# Patient Record
Sex: FEMALE | Race: WHITE | Employment: FULL TIME | URBAN - METROPOLITAN AREA
[De-identification: names, ages, dates, MRNs, and addresses within clinical notes are randomized per-mention and may not be internally consistent; named-entity substitution may affect disease eponyms.]

---

## 2017-03-22 ENCOUNTER — HOSPITAL ENCOUNTER (OUTPATIENT)
Age: 27
Setting detail: SPECIMEN
Discharge: HOME | End: 2017-03-22

## 2017-03-22 ENCOUNTER — OFFICE VISIT (OUTPATIENT)
Dept: OBGYN CLINIC | Age: 27
End: 2017-03-22
Payer: COMMERCIAL

## 2017-03-22 VITALS — DIASTOLIC BLOOD PRESSURE: 64 MMHG | WEIGHT: 293 LBS | SYSTOLIC BLOOD PRESSURE: 126 MMHG

## 2017-03-22 DIAGNOSIS — Z01.419 ENCOUNTER FOR GYNECOLOGICAL EXAMINATION: Primary | ICD-10-CM

## 2017-03-22 DIAGNOSIS — N91.5 OLIGOMENORRHEA: ICD-10-CM

## 2017-03-22 DIAGNOSIS — E28.2 PCOS (POLYCYSTIC OVARIAN SYNDROME): ICD-10-CM

## 2017-03-22 PROCEDURE — 99385 PREV VISIT NEW AGE 18-39: CPT | Performed by: OBSTETRICS & GYNECOLOGY

## 2017-03-22 RX ORDER — PAROXETINE HYDROCHLORIDE 20 MG/1
20 TABLET, FILM COATED ORAL EVERY MORNING
COMMUNITY
End: 2021-12-22 | Stop reason: ALTCHOICE

## 2017-03-27 ENCOUNTER — HOSPITAL ENCOUNTER (OUTPATIENT)
Dept: ULTRASOUND IMAGING | Age: 27
Discharge: HOME | End: 2017-03-27
Payer: COMMERCIAL

## 2017-03-27 ENCOUNTER — TELEPHONE (OUTPATIENT)
Dept: OBGYN CLINIC | Age: 27
End: 2017-03-27

## 2017-03-27 DIAGNOSIS — E28.2 PCOS (POLYCYSTIC OVARIAN SYNDROME): Primary | ICD-10-CM

## 2017-03-27 DIAGNOSIS — E28.2 PCOS (POLYCYSTIC OVARIAN SYNDROME): ICD-10-CM

## 2017-03-27 DIAGNOSIS — N91.5 OLIGOMENORRHEA: ICD-10-CM

## 2017-03-27 LAB — CYTOLOGY REPORT: NORMAL

## 2017-03-27 PROCEDURE — 76830 TRANSVAGINAL US NON-OB: CPT

## 2017-03-27 PROCEDURE — 76856 US EXAM PELVIC COMPLETE: CPT

## 2017-06-07 ENCOUNTER — TELEPHONE (OUTPATIENT)
Dept: OBGYN CLINIC | Age: 27
End: 2017-06-07

## 2017-06-07 DIAGNOSIS — E28.2 PCOS (POLYCYSTIC OVARIAN SYNDROME): Primary | ICD-10-CM

## 2017-06-13 ENCOUNTER — TELEPHONE (OUTPATIENT)
Dept: OBGYN CLINIC | Age: 27
End: 2017-06-13

## 2017-06-13 DIAGNOSIS — E28.2 PCOS (POLYCYSTIC OVARIAN SYNDROME): Primary | ICD-10-CM

## 2017-08-24 ENCOUNTER — HOSPITAL ENCOUNTER (OUTPATIENT)
Age: 27
Discharge: HOME | End: 2017-08-24
Payer: COMMERCIAL

## 2017-08-24 DIAGNOSIS — E28.2 PCOS (POLYCYSTIC OVARIAN SYNDROME): ICD-10-CM

## 2017-08-24 LAB
ABSOLUTE EOS #: 0.1 K/UL (ref 0–0.4)
ABSOLUTE LYMPH #: 2.7 K/UL (ref 1–4.8)
ABSOLUTE MONO #: 0.5 K/UL (ref 0.1–1.2)
BASOPHILS # BLD: 1 %
BASOPHILS ABSOLUTE: 0 K/UL (ref 0–0.2)
DIFFERENTIAL TYPE: NORMAL
EOSINOPHILS RELATIVE PERCENT: 1 %
GLUCOSE FASTING: 87 MG/DL (ref 70–99)
HCT VFR BLD CALC: 39.3 % (ref 36–46)
HEMOGLOBIN: 13 G/DL (ref 12–16)
INSULIN COMMENT: NORMAL
INSULIN REFERENCE RANGE:: NORMAL
INSULIN: 30.8 MU/L
LYMPHOCYTES # BLD: 31 %
MCH RBC QN AUTO: 26.8 PG (ref 26–34)
MCHC RBC AUTO-ENTMCNC: 32.9 G/DL (ref 31–37)
MCV RBC AUTO: 81.4 FL (ref 80–100)
MONOCYTES # BLD: 6 %
PDW BLD-RTO: 14.7 % (ref 12.5–15.4)
PLATELET # BLD: 402 K/UL (ref 140–450)
PLATELET ESTIMATE: NORMAL
PMV BLD AUTO: 8.1 FL (ref 6–12)
PROLACTIN: 16.01 UG/L (ref 4.79–23.3)
RBC # BLD: 4.84 M/UL (ref 4–5.2)
RBC # BLD: NORMAL 10*6/UL
SEG NEUTROPHILS: 61 %
SEGMENTED NEUTROPHILS ABSOLUTE COUNT: 5.3 K/UL (ref 1.8–7.7)
TSH SERPL DL<=0.05 MIU/L-ACNC: 2.17 MIU/L (ref 0.3–5)
WBC # BLD: 8.6 K/UL (ref 3.5–11)
WBC # BLD: NORMAL 10*3/UL

## 2017-08-24 PROCEDURE — 82947 ASSAY GLUCOSE BLOOD QUANT: CPT

## 2017-08-24 PROCEDURE — 83525 ASSAY OF INSULIN: CPT

## 2017-08-24 PROCEDURE — 84146 ASSAY OF PROLACTIN: CPT

## 2017-08-24 PROCEDURE — 36415 COLL VENOUS BLD VENIPUNCTURE: CPT

## 2017-08-24 PROCEDURE — 84443 ASSAY THYROID STIM HORMONE: CPT

## 2017-08-24 PROCEDURE — 85025 COMPLETE CBC W/AUTO DIFF WBC: CPT

## 2017-09-25 ENCOUNTER — INITIAL CONSULT (OUTPATIENT)
Dept: OBGYN CLINIC | Age: 27
End: 2017-09-25
Payer: COMMERCIAL

## 2017-09-25 VITALS
BODY MASS INDEX: 47.09 KG/M2 | SYSTOLIC BLOOD PRESSURE: 122 MMHG | HEIGHT: 66 IN | WEIGHT: 293 LBS | DIASTOLIC BLOOD PRESSURE: 82 MMHG

## 2017-09-25 DIAGNOSIS — L68.0 HIRSUTISM: ICD-10-CM

## 2017-09-25 DIAGNOSIS — E66.9 OBESITY, UNSPECIFIED OBESITY SEVERITY, UNSPECIFIED OBESITY TYPE: ICD-10-CM

## 2017-09-25 DIAGNOSIS — E28.2 PCOS (POLYCYSTIC OVARIAN SYNDROME): ICD-10-CM

## 2017-09-25 DIAGNOSIS — E88.81 INSULIN RESISTANCE: Primary | ICD-10-CM

## 2017-09-25 PROBLEM — E88.819 INSULIN RESISTANCE: Status: ACTIVE | Noted: 2017-09-25

## 2017-09-25 PROCEDURE — 99214 OFFICE O/P EST MOD 30 MIN: CPT | Performed by: OBSTETRICS & GYNECOLOGY

## 2017-10-10 ENCOUNTER — HOSPITAL ENCOUNTER (OUTPATIENT)
Age: 27
Discharge: HOME | End: 2017-10-10
Payer: COMMERCIAL

## 2017-10-10 DIAGNOSIS — E88.81 INSULIN RESISTANCE: ICD-10-CM

## 2017-10-10 DIAGNOSIS — E28.2 PCOS (POLYCYSTIC OVARIAN SYNDROME): ICD-10-CM

## 2017-10-10 DIAGNOSIS — L68.0 HIRSUTISM: ICD-10-CM

## 2017-10-10 LAB
SEX HORMONE BINDING GLOBULIN: 11 NMOL/L (ref 30–135)
TESTOSTERONE FREE-NONMALE: 10.3 PG/ML (ref 0.8–7.4)
TESTOSTERONE TOTAL: 35 NG/DL (ref 20–70)

## 2017-10-10 PROCEDURE — 84270 ASSAY OF SEX HORMONE GLOBUL: CPT

## 2017-10-10 PROCEDURE — 84403 ASSAY OF TOTAL TESTOSTERONE: CPT

## 2017-10-10 PROCEDURE — 82627 DEHYDROEPIANDROSTERONE: CPT

## 2017-10-10 PROCEDURE — 36415 COLL VENOUS BLD VENIPUNCTURE: CPT

## 2017-10-11 LAB — DHEAS (DHEA SULFATE): 249 UG/DL (ref 65–380)

## 2018-05-16 DIAGNOSIS — E28.2 PCOS (POLYCYSTIC OVARIAN SYNDROME): Primary | ICD-10-CM

## 2018-05-16 DIAGNOSIS — R79.89 ELEVATED TESTOSTERONE LEVEL IN FEMALE: ICD-10-CM

## 2018-05-16 DIAGNOSIS — E88.81 INSULIN RESISTANCE: ICD-10-CM

## 2018-05-16 PROCEDURE — 36415 COLL VENOUS BLD VENIPUNCTURE: CPT | Performed by: NURSE PRACTITIONER

## 2018-06-09 ENCOUNTER — HOSPITAL ENCOUNTER (OUTPATIENT)
Age: 28
Discharge: HOME | End: 2018-06-09
Payer: COMMERCIAL

## 2018-06-09 DIAGNOSIS — E88.81 INSULIN RESISTANCE: ICD-10-CM

## 2018-06-09 DIAGNOSIS — R79.89 ELEVATED TESTOSTERONE LEVEL IN FEMALE: ICD-10-CM

## 2018-06-09 DIAGNOSIS — E28.2 PCOS (POLYCYSTIC OVARIAN SYNDROME): ICD-10-CM

## 2018-06-09 LAB
GLUCOSE BLD-MCNC: 83 MG/DL (ref 70–99)
INSULIN COMMENT: NORMAL
INSULIN REFERENCE RANGE:: NORMAL
INSULIN: 30.1 MU/L
SEX HORMONE BINDING GLOBULIN: 12 NMOL/L (ref 30–135)
TESTOSTERONE FREE-NONMALE: 9.1 PG/ML (ref 0.8–7.4)
TESTOSTERONE TOTAL: 32 NG/DL (ref 20–70)

## 2018-06-09 PROCEDURE — 83525 ASSAY OF INSULIN: CPT

## 2018-06-09 PROCEDURE — 82947 ASSAY GLUCOSE BLOOD QUANT: CPT

## 2018-06-09 PROCEDURE — 36415 COLL VENOUS BLD VENIPUNCTURE: CPT

## 2018-06-09 PROCEDURE — 83036 HEMOGLOBIN GLYCOSYLATED A1C: CPT

## 2018-06-09 PROCEDURE — 84403 ASSAY OF TOTAL TESTOSTERONE: CPT

## 2018-06-09 PROCEDURE — 84270 ASSAY OF SEX HORMONE GLOBUL: CPT

## 2018-06-10 LAB
ESTIMATED AVERAGE GLUCOSE: 108 MG/DL
HBA1C MFR BLD: 5.4 % (ref 4–6)

## 2018-06-11 ENCOUNTER — OFFICE VISIT (OUTPATIENT)
Dept: OBGYN CLINIC | Age: 28
End: 2018-06-11
Payer: COMMERCIAL

## 2018-06-11 ENCOUNTER — TELEPHONE (OUTPATIENT)
Dept: OBGYN CLINIC | Age: 28
End: 2018-06-11

## 2018-06-11 VITALS
DIASTOLIC BLOOD PRESSURE: 78 MMHG | SYSTOLIC BLOOD PRESSURE: 118 MMHG | WEIGHT: 293 LBS | BODY MASS INDEX: 47.09 KG/M2 | RESPIRATION RATE: 16 BRPM | HEIGHT: 66 IN

## 2018-06-11 DIAGNOSIS — E88.81 INSULIN RESISTANCE: ICD-10-CM

## 2018-06-11 DIAGNOSIS — E28.2 PCOS (POLYCYSTIC OVARIAN SYNDROME): Primary | ICD-10-CM

## 2018-06-11 DIAGNOSIS — R93.89 THICKENED ENDOMETRIUM: ICD-10-CM

## 2018-06-11 DIAGNOSIS — R79.89 ELEVATED TESTOSTERONE LEVEL IN FEMALE: ICD-10-CM

## 2018-06-11 PROCEDURE — 99214 OFFICE O/P EST MOD 30 MIN: CPT | Performed by: NURSE PRACTITIONER

## 2018-06-11 ASSESSMENT — ENCOUNTER SYMPTOMS
DIARRHEA: 0
SHORTNESS OF BREATH: 0
BACK PAIN: 0
ABDOMINAL DISTENTION: 0
ABDOMINAL PAIN: 0
VOICE CHANGE: 0
WHEEZING: 0
CONSTIPATION: 0
COLOR CHANGE: 0
TROUBLE SWALLOWING: 0
NAUSEA: 0
SORE THROAT: 0
STRIDOR: 0
COUGH: 0

## 2018-06-20 ENCOUNTER — HOSPITAL ENCOUNTER (OUTPATIENT)
Dept: ULTRASOUND IMAGING | Age: 28
Discharge: HOME | End: 2018-06-22
Payer: COMMERCIAL

## 2018-06-20 DIAGNOSIS — R93.89 THICKENED ENDOMETRIUM: ICD-10-CM

## 2018-06-20 PROCEDURE — 76830 TRANSVAGINAL US NON-OB: CPT

## 2018-06-20 PROCEDURE — 76856 US EXAM PELVIC COMPLETE: CPT

## 2018-06-21 ENCOUNTER — TELEPHONE (OUTPATIENT)
Dept: OBGYN CLINIC | Age: 28
End: 2018-06-21

## 2018-06-23 ENCOUNTER — HOSPITAL ENCOUNTER (OUTPATIENT)
Age: 28
Discharge: HOME | End: 2018-06-23
Payer: COMMERCIAL

## 2018-06-23 DIAGNOSIS — E28.2 PCOS (POLYCYSTIC OVARIAN SYNDROME): ICD-10-CM

## 2018-06-23 DIAGNOSIS — E88.81 INSULIN RESISTANCE: ICD-10-CM

## 2018-06-23 LAB — PROGESTERONE LEVEL: 1.33 NG/ML

## 2018-06-23 PROCEDURE — 36415 COLL VENOUS BLD VENIPUNCTURE: CPT

## 2018-06-23 PROCEDURE — 84144 ASSAY OF PROGESTERONE: CPT

## 2018-06-28 ENCOUNTER — PATIENT MESSAGE (OUTPATIENT)
Dept: OBGYN CLINIC | Age: 28
End: 2018-06-28

## 2018-07-23 ENCOUNTER — TELEPHONE (OUTPATIENT)
Dept: OBGYN CLINIC | Age: 28
End: 2018-07-23

## 2018-08-10 ENCOUNTER — TELEPHONE (OUTPATIENT)
Dept: OBGYN CLINIC | Age: 28
End: 2018-08-10

## 2018-08-10 DIAGNOSIS — N97.0 ANOVULATION: Primary | ICD-10-CM

## 2018-08-10 NOTE — TELEPHONE ENCOUNTER
Patient called in stating she would like clomid called into her pharmacy. Patient states she started her cycle today.       [facility] [place] [facility] [place]

## 2018-08-12 NOTE — TELEPHONE ENCOUNTER
I did send to her pharmacy clomid 50 mg # 5 1 day 3-7 of cycle   She will need to get a Progesterone drawn opn day 21 of this cycle  which will be 8/30   The order is in the [employer] system

## 2018-08-30 ENCOUNTER — HOSPITAL ENCOUNTER (OUTPATIENT)
Age: 28
Discharge: HOME | End: 2018-08-30

## 2018-08-30 DIAGNOSIS — N97.0 ANOVULATION: ICD-10-CM

## 2018-08-30 LAB — PROGESTERONE LEVEL: 1.73 NG/ML

## 2018-08-30 PROCEDURE — 84144 ASSAY OF PROGESTERONE: CPT

## 2018-08-30 PROCEDURE — 36415 COLL VENOUS BLD VENIPUNCTURE: CPT

## 2018-12-03 ENCOUNTER — PATIENT MESSAGE (OUTPATIENT)
Dept: OBGYN CLINIC | Age: 28
End: 2018-12-03

## 2018-12-03 DIAGNOSIS — E28.2 PCOS (POLYCYSTIC OVARIAN SYNDROME): ICD-10-CM

## 2018-12-03 DIAGNOSIS — E88.81 INSULIN RESISTANCE: Primary | ICD-10-CM

## 2019-03-24 ENCOUNTER — HOSPITAL ENCOUNTER (OUTPATIENT)
Age: 29
Discharge: HOME | End: 2019-03-24
Payer: COMMERCIAL

## 2019-03-24 DIAGNOSIS — E88.81 INSULIN RESISTANCE: ICD-10-CM

## 2019-03-24 DIAGNOSIS — E28.2 PCOS (POLYCYSTIC OVARIAN SYNDROME): ICD-10-CM

## 2019-03-24 LAB
GLUCOSE BLD-MCNC: 73 MG/DL (ref 70–99)
INSULIN COMMENT: NORMAL
INSULIN REFERENCE RANGE:: NORMAL
INSULIN: 20.2 MU/L

## 2019-03-24 PROCEDURE — 82947 ASSAY GLUCOSE BLOOD QUANT: CPT

## 2019-03-24 PROCEDURE — 36415 COLL VENOUS BLD VENIPUNCTURE: CPT

## 2019-03-24 PROCEDURE — 83036 HEMOGLOBIN GLYCOSYLATED A1C: CPT

## 2019-03-24 PROCEDURE — 83525 ASSAY OF INSULIN: CPT

## 2019-03-25 LAB
ESTIMATED AVERAGE GLUCOSE: 114 MG/DL
HBA1C MFR BLD: 5.6 % (ref 4–6)

## 2019-03-26 ENCOUNTER — TELEPHONE (OUTPATIENT)
Dept: OBGYN CLINIC | Age: 29
End: 2019-03-26

## 2019-03-27 ENCOUNTER — OFFICE VISIT (OUTPATIENT)
Dept: OBGYN CLINIC | Age: 29
End: 2019-03-27
Payer: COMMERCIAL

## 2019-03-27 VITALS
HEIGHT: 66 IN | SYSTOLIC BLOOD PRESSURE: 116 MMHG | DIASTOLIC BLOOD PRESSURE: 68 MMHG | BODY MASS INDEX: 47.09 KG/M2 | WEIGHT: 293 LBS | RESPIRATION RATE: 16 BRPM

## 2019-03-27 DIAGNOSIS — N97.0 ANOVULATION: Primary | ICD-10-CM

## 2019-03-27 DIAGNOSIS — E28.2 PCOS (POLYCYSTIC OVARIAN SYNDROME): ICD-10-CM

## 2019-03-27 DIAGNOSIS — E88.81 INSULIN RESISTANCE: ICD-10-CM

## 2019-03-27 DIAGNOSIS — E66.9 OBESITY, UNSPECIFIED OBESITY SEVERITY, UNSPECIFIED OBESITY TYPE: ICD-10-CM

## 2019-03-27 PROCEDURE — 99214 OFFICE O/P EST MOD 30 MIN: CPT | Performed by: NURSE PRACTITIONER

## 2019-03-27 ASSESSMENT — ENCOUNTER SYMPTOMS
WHEEZING: 0
VOMITING: 0
COLOR CHANGE: 0
CHEST TIGHTNESS: 0
NAUSEA: 0
SHORTNESS OF BREATH: 0
ABDOMINAL PAIN: 0

## 2019-04-09 ENCOUNTER — OFFICE VISIT (OUTPATIENT)
Dept: OBGYN CLINIC | Age: 29
End: 2019-04-09
Payer: COMMERCIAL

## 2019-04-09 ENCOUNTER — HOSPITAL ENCOUNTER (OUTPATIENT)
Age: 29
Setting detail: SPECIMEN
Discharge: HOME | End: 2019-04-09
Payer: COMMERCIAL

## 2019-04-09 VITALS
SYSTOLIC BLOOD PRESSURE: 116 MMHG | HEIGHT: 66 IN | WEIGHT: 293 LBS | BODY MASS INDEX: 47.09 KG/M2 | RESPIRATION RATE: 16 BRPM | DIASTOLIC BLOOD PRESSURE: 72 MMHG

## 2019-04-09 DIAGNOSIS — Z01.419 WOMEN'S ANNUAL ROUTINE GYNECOLOGICAL EXAMINATION: Primary | ICD-10-CM

## 2019-04-09 DIAGNOSIS — Z12.4 CERVICAL CANCER SCREENING: ICD-10-CM

## 2019-04-09 PROCEDURE — 99395 PREV VISIT EST AGE 18-39: CPT | Performed by: NURSE PRACTITIONER

## 2019-04-09 ASSESSMENT — ENCOUNTER SYMPTOMS
DIARRHEA: 0
BACK PAIN: 0
CONSTIPATION: 0
NAUSEA: 0
COUGH: 0
VOMITING: 0
RHINORRHEA: 0
SHORTNESS OF BREATH: 0
ABDOMINAL PAIN: 0
COLOR CHANGE: 0

## 2019-04-09 NOTE — PROGRESS NOTES
Social History     Tobacco History     Smoking Status  Never Smoker    Smokeless Tobacco Use  Never Used          Alcohol History     Alcohol Use Status  Yes Comment  occ,          Drug Use     Drug Use Status  Not Asked          Sexual Activity     Sexually Active  Not Asked              No Known Allergies  Current Outpatient Medications   Medication Sig Dispense Refill    clomiPHENE (CLOMID) 50 MG tablet Take 1 tablet by mouth daily Start on day 3 of cycle  1 daily 5 tablet 0    PARoxetine (PAXIL) 20 MG tablet Take 20 mg by mouth every morning      metFORMIN (GLUCOPHAGE) 1000 MG tablet Take 1,000 mg by mouth 2 times daily (with meals)       No current facility-administered medications for this visit. Subjective:     Review of Systems   Constitutional: Negative for chills, fatigue, fever and unexpected weight change. HENT: Negative for congestion and rhinorrhea. Eyes: Negative for visual disturbance. Respiratory: Negative for cough and shortness of breath. Cardiovascular: Negative for chest pain, palpitations and leg swelling. Gastrointestinal: Negative for abdominal pain, constipation, diarrhea, nausea and vomiting. Endocrine: Negative for cold intolerance, heat intolerance, polydipsia and polyuria. Genitourinary: Negative for dyspareunia, dysuria, flank pain, menstrual problem, pelvic pain, vaginal bleeding, vaginal discharge and vaginal pain. Musculoskeletal: Negative for back pain and myalgias. Skin: Negative for color change and rash. Neurological: Negative for dizziness, light-headedness and headaches. Hematological: Negative for adenopathy. Does not bruise/bleed easily. Psychiatric/Behavioral: Negative for self-injury and suicidal ideas. Objective:     Physical Exam   Constitutional: She is oriented to person, place, and time. She appears well-developed and well-nourished. No distress. HENT:   Head: Normocephalic and atraumatic.    Right Ear: External ear normal.   Left Ear: External ear normal.   Nose: Nose normal.   Mouth/Throat: Oropharynx is clear and moist.   Eyes: Pupils are equal, round, and reactive to light. EOM are normal.   Neck: Normal range of motion. Neck supple. No thyromegaly present. Cardiovascular: Normal rate, regular rhythm and normal heart sounds. Exam reveals no gallop and no friction rub. No murmur heard. No bilateral calf tenderness or swelling   Pulmonary/Chest: Effort normal and breath sounds normal. No respiratory distress. She has no wheezes. Abdominal: Soft. Bowel sounds are normal. There is no tenderness. Genitourinary:   Genitourinary Comments: Breasts nipples everted, no masses or tenderness, does BSE  Vulva-no lesions  Vagina-pink rugated  Cervix-firm, 2 cm. Nontender, freely movable, no lesions  Uterus-ant. Smooth, firm, nontender, freely movable  Adnexa-no masses or tenderness    Musculoskeletal: Normal range of motion. Lymphadenopathy:     She has no cervical adenopathy. She has no axillary adenopathy. Right: No inguinal adenopathy present. Left: No inguinal adenopathy present. Neurological: She is alert and oriented to person, place, and time. She has normal reflexes. No cranial nerve deficit. Skin: Skin is warm and dry. No rash noted. She is not diaphoretic. Psychiatric: She has a normal mood and affect. Her behavior is normal. Judgment and thought content normal.   Nursing note and vitals reviewed. /72 (Site: Left Upper Arm, Position: Sitting, Cuff Size: Large Adult)   Resp 16   Ht 5' 6\" (1.676 m)   Wt (!) 301 lb 2 oz (136.6 kg)   LMP 03/26/2019 (Exact Date)   BMI 48.60 kg/m²     Assessment:       Diagnosis Orders   1. Women's annual routine gynecological examination  PAP Smear   2. Cervical cancer screening  PAP Smear       Breast exam completed. Pelvic exam pap smear collected and sent.  Cultures sent No    Plan:   Collect pap   BSE reviewed  STD prevention reviewed  Cultures declined   Check progesterone level April 15th if still not ovulating refer back to fertility specialist- Dr. Ivon Batista? Continue efforts weight loss. Refill medication if appropriate  Diet & Exercise reviewed with pt. Preventive  Health through PCP   RV prn/annual           Orders Placed This Encounter   Procedures    PAP Smear     Patient History:    Patient's last menstrual period was 03/27/2019 (exact date). OBGYN Status: Having periods  Past Surgical History:  05/2013: TONSILLECTOMY  12/2011: WISDOM TOOTH EXTRACTION      Social History    Tobacco Use      Smoking status: Never Smoker      Smokeless tobacco: Never Used       Standing Status:   Future     Standing Expiration Date:   4/9/2020     Order Specific Question:   Collection Type     Answer: Thin Prep     Order Specific Question:   Prior Abnormal Pap Test     Answer:   No     Order Specific Question:   Screening or Diagnostic     Answer:   Screening     Order Specific Question:   HPV Requested? Answer:   Yes -  If ASCUS Reflex HPV     Order Specific Question:   High Risk Patient     Answer:   N/A         Patient given educational materials - seepatient instructions. Discussed use, benefit, and side effects of prescribed medications. All patient questions answered. Pt voiced understanding. Reviewed health maintenance. Instructed to continue current medications, diet and exercise. Patient agreedwith treatment plan. Follow up as directed.       Electronically signed by DAVIAN Paz CNP on 4/9/2019at 8:48 AM

## 2019-04-09 NOTE — PATIENT INSTRUCTIONS
Patient Education      Patient Education      Patient Education        clomiphene  Pronunciation:  Mariel Azeem [address] rajat Linares:  Clomid, Serophene  What is the most important information I should know about clomiphene? Do not use clomiphene if you are already pregnant. You should not use clomiphene if you have: liver disease, abnormal vaginal bleeding, an uncontrolled adrenal gland or thyroid disorder, an ovarian cyst (unrelated to polycystic ovary syndrome), or if you are pregnant. What is clomiphene? Clomiphene is a nonsteroidal fertility medicine. It causes the pituitary gland to release hormones needed to stimulate ovulation (the release of an egg from the ovary). Clomiphene is used to cause ovulation in women with certain medical conditions (such as polycystic ovary syndrome) that prevent naturally occurring ovulation. Clomiphene may also be used for purposes not listed in this medication guide. What should I discuss with my healthcare provider before taking clomiphene? You should not use clomiphene if you are allergic to it, or if you have:  · abnormal vaginal bleeding;  · an ovarian cyst that is not related to polycystic ovary syndrome;  · past or present liver disease;  · a tumor of your pituitary gland;  · an untreated or uncontrolled problem with your thyroid or adrenal gland; or  · if you are pregnant. To make sure clomiphene is safe for you, tell your doctor if you have:  · endometriosis or uterine fibroids. Do not use clomiphene if you are already pregnant. Talk to your doctor if you have concerns about the possible effects of clomiphene on a new pregnancy. Clomiphene can pass into breast milk and may harm a nursing baby. This medicine may slow breast milk production in some women. Tell your doctor if you are breast-feeding a baby. Using clomiphene for longer than 3 treatment cycles may increase your risk of developing an ovarian tumor. Ask your doctor about your specific risk.   [employer] ('[employer]') is accurate, up-to-date, and complete, but no guarantee is made to that effect. Drug information contained herein may be time sensitive. [employer] information has been compiled for use by healthcare practitioners and consumers in the [place] and therefore [employer] does not warrant that uses outside of the [place] are appropriate, unless specifically indicated otherwise. [employer] drug information does not endorse drugs, diagnose patients or recommend therapy. [employer] drug information is an informational resource designed to assist licensed healthcare practitioners in caring for their patients and/or to serve consumers viewing this service as a supplement to, and not a substitute for, the expertise, skill, knowledge and judgment of healthcare practitioners. The absence of a warning for a given drug or drug combination in no way should be construed to indicate that the drug or drug combination is safe, effective or appropriate for any given patient. [employer] does not assume any responsibility for any aspect of healthcare administered with the aid of information [employer] provides. The information contained herein is not intended to cover all possible uses, directions, precautions, warnings, drug interactions, allergic reactions, or adverse effects. If you have questions about the drugs you are taking, check with your doctor, nurse or pharmacist.  Copyright 6989-9414 [address] Version: 3.01. Revision date: 1/18/2016. Care instructions adapted under license by [facility] ([facility]). If you have questions about a medical condition or this instruction, always ask your healthcare professional. [place] [address] any warranty or liability for your use of this information. Pap Test: Care Instructions  Your Care Instructions    The Pap test (also called a Pap smear) is a screening test for cancer of the cervix, which is the lower part of the uterus that opens into the vagina.  The test can help your doctor find early changes in the cells that could lead to cancer. The sample of cells taken during your test has been sent to a lab so that an expert can look at the cells. It usually takes a week or two to get the results back. Follow-up care is a key part of your treatment and safety. Be sure to make and go to all appointments, and call your doctor if you are having problems. It's also a good idea to know your test results and keep a list of the medicines you take. What do the results mean? · A normal result means that the test did not find any abnormal cells in the sample. · An abnormal result can mean many things. Most of these are not cancer. The results of your test may be abnormal because:  ? You have an infection of the vagina or cervix, such as a yeast infection. ? You have an IUD (intrauterine device for birth control). ? You have low estrogen levels after menopause that are causing the cells to change. ? You have cell changes that may be a sign of precancer or cancer. The results are ranked based on how serious the changes might be. There are many other reasons why you might not get a normal result. If the results were abnormal, you may need to get another test within a few weeks or months. If the results show changes that could be a sign of cancer, you may need a test called a colposcopy, which provides a more complete view of the cervix. Sometimes the lab cannot use the sample because it does not contain enough cells or was not preserved well. If so, you may need to have the test again. This is not common, but it does happen from time to time. When should you call for help? Watch closely for changes in your health, and be sure to contact your doctor if:    · You have vaginal bleeding or pain for more than 2 days after the test. It is normal to have a small amount of bleeding for a day or two after the test.   Where can you learn more?   Go to https://chpepiceweb.healthLM [email or web address] org and sign in to your SailPlay account. Enter A180 in the [employer] box to learn more about \"Pap Test: Care Instructions. \"     If you do not have an account, please click on the \"Sign Up Now\" link. Current as of: March 27, 2018  Content Version: 11.9  © 0636-7018 [employer]. Care instructions adapted under license by [facility] ([facility]). If you have questions about a medical condition or this instruction, always ask your healthcare professional. [place] [address] any warranty or liability for your use of this information. Breast Self-Exam: Care Instructions  Your Care Instructions    A breast self-exam is when you check your breasts for lumps or changes. This regular exam helps you learn how your breasts normally look and feel. Most breast problems or changes are not because of cancer. Breast self-exam is not a substitute for a mammogram. Having regular breast exams by your doctor and regular mammograms improve your chances of finding any problems with your breasts. Some women set a time each month to do a step-by-step breast self-exam. Other women like a less formal system. They might look at their breasts as they brush their teeth, or feel their breasts once in a while in the shower. If you notice a change in your breast, tell your doctor. Follow-up care is a key part of your treatment and safety. Be sure to make and go to all appointments, and call your doctor if you are having problems. It's also a good idea to know your test results and keep a list of the medicines you take. How do you do a breast self-exam?  · The best time to examine your breasts is usually one week after your menstrual period begins. Your breasts should not be tender then. If you do not have periods, you might do your exam on a day of the month that is easy to remember.   · To examine your breasts:  ? Remove all your clothes above the waist and lie down. When you are lying down, your breast tissue spreads evenly over your chest wall, which makes it easier to feel all your breast tissue. ? Use the pads--not the fingertips--of the 3 middle fingers of your left hand to check your right breast. Move your fingers slowly in small coin-sized circles that overlap. ? Use three levels of pressure to feel of all your breast tissue. Use light pressure to feel the tissue close to the skin surface. Use medium pressure to feel a little deeper. Use firm pressure to feel your tissue close to your breastbone and ribs. Use each pressure level to feel your breast tissue before moving on to the next spot. ? Check your entire breast, moving up and down as if following a strip from the collarbone to the bra line, and from the armpit to the ribs. Repeat until you have covered the entire breast.  ? Repeat this procedure for your left breast, using the pads of the 3 middle fingers of your right hand. · To examine your breasts while in the shower:  ? Place one arm over your head and lightly soap your breast on that side. ? Using the pads of your fingers, gently move your hand over your breast (in the strip pattern described above), feeling carefully for any lumps or changes. ? Repeat for the other breast.  · Have your doctor inspect anything you notice to see if you need further testing. Where can you learn more? Go to https://Pelican Renewablespegómezeb.ClaimReturn. org and sign in to your [employer]ik account. Enter P148 in the [facility] box to learn more about \"Breast Self-Exam: Care Instructions. \"     If you do not have an account, please click on the \"Sign Up Now\" link. Current as of: March 27, 2018  Content Version: 11.9  © 0992-2079 [employer], [employer]. Care instructions adapted under license by [facility] ([facility]).  If you have questions about a medical condition or this instruction, always ask your healthcare professional. Bonita Rose disclaims any warranty or liability for your use of this information.

## 2019-04-14 ENCOUNTER — PATIENT MESSAGE (OUTPATIENT)
Dept: OBGYN CLINIC | Age: 29
End: 2019-04-14

## 2019-04-15 ENCOUNTER — HOSPITAL ENCOUNTER (OUTPATIENT)
Age: 29
Discharge: HOME | End: 2019-04-15

## 2019-04-15 DIAGNOSIS — N97.0 ANOVULATION: ICD-10-CM

## 2019-04-15 PROCEDURE — 84144 ASSAY OF PROGESTERONE: CPT

## 2019-04-15 PROCEDURE — 36415 COLL VENOUS BLD VENIPUNCTURE: CPT

## 2019-04-15 RX ORDER — METFORMIN HYDROCHLORIDE 1000 MG/1
1000 TABLET, FILM COATED, EXTENDED RELEASE ORAL 2 TIMES DAILY WITH MEALS
Qty: 60 TABLET | Refills: 3 | Status: SHIPPED | OUTPATIENT
Start: 2019-04-15 | End: 2019-04-17

## 2019-04-15 NOTE — TELEPHONE ENCOUNTER
From: Gina Renae  To: Heber Closs, APRN - CNP  Sent: 4/14/2019 10:52 PM EDT  Subject: Prescription Question    My metformin was prescribed by dr. Isaura Cote and since I don't go there anymore and am out of re-fills, I wanted to see if you could write the prescription from now on? He had me on 2000 mg a day and was doing the xr. If you're able to, could you have it sent to the [facility] in [place]?  Thank you!!

## 2019-04-16 LAB — PROGESTERONE LEVEL: 19.83 NG/ML

## 2019-04-17 ENCOUNTER — TELEPHONE (OUTPATIENT)
Dept: OBGYN CLINIC | Age: 29
End: 2019-04-17

## 2019-04-17 RX ORDER — METFORMIN HYDROCHLORIDE 500 MG/1
2000 TABLET, EXTENDED RELEASE ORAL DAILY
Qty: 120 TABLET | Refills: 3 | Status: SHIPPED | OUTPATIENT
Start: 2019-04-17 | End: 2019-08-17 | Stop reason: SDUPTHER

## 2019-04-17 NOTE — TELEPHONE ENCOUNTER
Pharmacy called in stating patient was there, to  her metformin. Patient states that she takes metformin XR 500mg PO after dinner. Patients pharmacy states that is not the correct dose that she received and was wondering if you could change it. Madison Rosado has sent over new prescription to pharmacy. *

## 2019-04-18 DIAGNOSIS — N76.0 BV (BACTERIAL VAGINOSIS): Primary | ICD-10-CM

## 2019-04-18 DIAGNOSIS — B96.89 BV (BACTERIAL VAGINOSIS): Primary | ICD-10-CM

## 2019-04-18 LAB — CYTOLOGY REPORT: NORMAL

## 2019-04-18 RX ORDER — METRONIDAZOLE 500 MG/1
500 TABLET ORAL 2 TIMES DAILY
Qty: 14 TABLET | Refills: 0 | Status: SHIPPED | OUTPATIENT
Start: 2019-04-18 | End: 2019-04-25

## 2019-04-28 ENCOUNTER — PATIENT MESSAGE (OUTPATIENT)
Dept: OBGYN CLINIC | Age: 29
End: 2019-04-28

## 2019-04-28 DIAGNOSIS — N97.0 ANOVULATION: Primary | ICD-10-CM

## 2019-04-29 ENCOUNTER — PATIENT MESSAGE (OUTPATIENT)
Dept: OBGYN CLINIC | Age: 29
End: 2019-04-29

## 2019-04-29 NOTE — TELEPHONE ENCOUNTER
From: Matthew Guajardo  To: Carson Barriga, APRN - CNP  Sent: 4/28/2019 2:21 PM EDT  Subject: Non-Urgent Medical Question    I talked to one of the assistants and she said to let you guys know when I started my period and talked about doing another round of the clomid. I started my period today but I don't want to do another round this month. Could we not do it this month but order the progesterone test to see if I ovulated without the clomid? Since my insulin went down so much I want to see if that plus other changes could have helped.  Thank you!!

## 2019-04-29 NOTE — TELEPHONE ENCOUNTER
From: Gina Renae  To: Heber Closs, APRN - CNP  Sent: 4/29/2019 12:10 PM EDT  Subject: RE: Non-Urgent Medical Question    Brando Lagunas,    I'm willing to do another month! I didn't know it was typically done in 3 month increments. But I'm definitely okay with doing another month. Thank you!     ----- Message -----  From: Heber Closs, APRN - CNP  Sent: 4/29/19, 12:08 PM  To: Gina Renae  Subject: RE: Non-Urgent Medical Question    Ashish Jamison    I would typically recommend we do 3 consecutive months of the clomid but if you really don't want to try it this month I can still give you order for the progesterone on day 21 of your cycle to complete. I will place order. Heber Closs CNP     ----- Message -----   From: Gina Renae   Sent: 4/28/2019 2:21 PM EDT   To: Heber Closs, APRN - CNP  Subject: Non-Urgent Medical Question    I talked to one of the assistants and she said to let you guys know when I started my period and talked about doing another round of the clomid. I started my period today but I don't want to do another round this month. Could we not do it this month but order the progesterone test to see if I ovulated without the clomid? Since my insulin went down so much I want to see if that plus other changes could have helped.  Thank you!!

## 2019-05-16 ENCOUNTER — PATIENT MESSAGE (OUTPATIENT)
Dept: OBGYN CLINIC | Age: 29
End: 2019-05-16

## 2019-05-17 NOTE — TELEPHONE ENCOUNTER
From: Darryle Lichtenstein  To: DAVIAN Littlejohn CNP  Sent: 5/16/2019 7:38 PM EDT  Subject: Non-Urgent Medical Question    Hi!  Do I need the order for the progesterone test?

## 2019-05-18 ENCOUNTER — HOSPITAL ENCOUNTER (OUTPATIENT)
Age: 29
Discharge: HOME | End: 2019-05-18

## 2019-05-18 DIAGNOSIS — N97.0 ANOVULATION: ICD-10-CM

## 2019-05-18 LAB — PROGESTERONE LEVEL: 14.22 NG/ML

## 2019-05-18 PROCEDURE — 84144 ASSAY OF PROGESTERONE: CPT

## 2019-05-18 PROCEDURE — 36415 COLL VENOUS BLD VENIPUNCTURE: CPT

## 2019-05-24 ENCOUNTER — PATIENT MESSAGE (OUTPATIENT)
Dept: OBGYN CLINIC | Age: 29
End: 2019-05-24

## 2019-05-24 NOTE — TELEPHONE ENCOUNTER
From: Avis Johnson  To: Elva March  Sent: 5/24/2019 8:11 AM EDT  Subject: Progesterone Level    Hi Romy Maurice received your progesterone level and it did show ovulation- we like anything over 10 and yours was 14.22- if you miss a period please let the office know and we will order a pregnancy test for you.      Thanks   Sandie MILLAN/Surgery Scheduler

## 2019-05-31 ENCOUNTER — PATIENT MESSAGE (OUTPATIENT)
Dept: OBGYN CLINIC | Age: 29
End: 2019-05-31

## 2019-05-31 DIAGNOSIS — N97.0 ANOVULATION: Primary | ICD-10-CM

## 2019-05-31 NOTE — TELEPHONE ENCOUNTER
From: Shaw Murphy  To: OWENshen Raquel  Sent: 5/30/2019 7:10 PM EDT  Subject: Progesterone Level    Belinda Simon,    I started my period today    ----- Message -----  From: DAVIAN Flaherty CNP  Sent: 5/24/19, 9:26 AM  To: Shaw Murphy  Subject: RE: Progesterone Level    Bhavin Barrera    Yes if you get your period let us know and I will call in the third final round of clomid and recheck progesterone level again at day 21. Just let us know! Solange Damon CNP     ----- Message -----   From: Shaw Murphy   Sent: 5/24/2019 8:22 AM EDT   To: Elli MOHR  Subject: RE: Progesterone Level    okay, thank you! If I do get my period, will we do one more round of the clomid?    ----- Message -----  From: Meena Holland  Sent: 5/24/19, 8:11 AM  To: Shaw Murphy  Subject: Progesterone Level    Hi Joedeepak Jamesabbie received your progesterone level and it did show ovulation- we like anything over 10 and yours was 14.22- if you miss a period please let the office know and we will order a pregnancy test for you.      Thanks   Sandie MILLAN/Surgery Scheduler

## 2019-06-19 ENCOUNTER — HOSPITAL ENCOUNTER (OUTPATIENT)
Age: 29
Discharge: HOME | End: 2019-06-19
Payer: COMMERCIAL

## 2019-06-19 DIAGNOSIS — N97.0 ANOVULATION: ICD-10-CM

## 2019-06-19 LAB — PROGESTERONE LEVEL: 24.62 NG/ML

## 2019-06-19 PROCEDURE — 84144 ASSAY OF PROGESTERONE: CPT

## 2019-06-19 PROCEDURE — 36415 COLL VENOUS BLD VENIPUNCTURE: CPT

## 2019-06-21 ENCOUNTER — TELEPHONE (OUTPATIENT)
Dept: OBGYN CLINIC | Age: 29
End: 2019-06-21

## 2019-06-21 NOTE — TELEPHONE ENCOUNTER
Pt was made aware of her results and was made aware to call the office is she misses and or starts her period- pt stated she understood.

## 2019-07-02 ENCOUNTER — PATIENT MESSAGE (OUTPATIENT)
Dept: OBGYN CLINIC | Age: 29
End: 2019-07-02

## 2019-07-02 DIAGNOSIS — N97.0 ANOVULATION: Primary | ICD-10-CM

## 2019-07-02 NOTE — TELEPHONE ENCOUNTER
From: Lindsey Ray  To: DAVIAN Ji CNP  Sent: 7/2/2019 9:54 AM EDT  Subject: Non-Urgent Medical Question    no I saw a different fertility specialist, dr. Fabian Hamlin I believe but I would like to go to someone else so dr. Sylvia Márquez would be fine. Could we check the level again this month at day 21? Thanks     ----- Message -----  From: DAVIAN Ji CNP  Sent: 7/2/19, 9:34 AM  To: Lindsey Ray  Subject: RE: Non-Urgent Medical Question    Erica Dial    So you have done 3 rounds now of clomid and had positive ovulation with this. At this point I would recommend we consider referral back to Dr. Sylvia Márquez fertility specialist is that who you saw previously? We can check a progesterone level again day 21 if you like but I think we could consider you seeing the fertility specialist.     Sally Barney CNP     ----- Message -----   From: Lindsey Ray   Sent: 7/2/2019 7:11 AM EDT   To: DAVIAN Ji CNP  Subject: Non-Urgent Medical Question    Maxine Landaverde has told me to send a message if I get a period. I started my period today.

## 2019-07-22 ENCOUNTER — HOSPITAL ENCOUNTER (OUTPATIENT)
Age: 29
Discharge: HOME | End: 2019-07-22
Payer: COMMERCIAL

## 2019-07-22 DIAGNOSIS — N97.0 ANOVULATION: ICD-10-CM

## 2019-07-22 LAB — PROGESTERONE LEVEL: 6.29 NG/ML

## 2019-07-22 PROCEDURE — 36415 COLL VENOUS BLD VENIPUNCTURE: CPT

## 2019-07-22 PROCEDURE — 84144 ASSAY OF PROGESTERONE: CPT

## 2019-07-25 ENCOUNTER — TELEPHONE (OUTPATIENT)
Dept: OBGYN CLINIC | Age: 29
End: 2019-07-25

## 2019-08-19 RX ORDER — METFORMIN HYDROCHLORIDE 500 MG/1
TABLET, EXTENDED RELEASE ORAL
Qty: 120 TABLET | Refills: 2 | Status: SHIPPED | OUTPATIENT
Start: 2019-08-19 | End: 2019-11-21 | Stop reason: SDUPTHER

## 2019-11-25 RX ORDER — METFORMIN HYDROCHLORIDE 500 MG/1
TABLET, EXTENDED RELEASE ORAL
Qty: 120 TABLET | Refills: 1 | Status: SHIPPED | OUTPATIENT
Start: 2019-11-25 | End: 2020-01-28 | Stop reason: SDUPTHER

## 2019-12-12 ENCOUNTER — PATIENT MESSAGE (OUTPATIENT)
Dept: OBGYN CLINIC | Age: 29
End: 2019-12-12

## 2019-12-12 DIAGNOSIS — N97.0 ANOVULATION: Primary | ICD-10-CM

## 2019-12-21 ENCOUNTER — HOSPITAL ENCOUNTER (OUTPATIENT)
Age: 29
Discharge: HOME | End: 2019-12-21
Payer: COMMERCIAL

## 2019-12-21 DIAGNOSIS — N97.0 ANOVULATION: ICD-10-CM

## 2019-12-21 LAB — PROGESTERONE LEVEL: 3.15 NG/ML

## 2019-12-21 PROCEDURE — 84144 ASSAY OF PROGESTERONE: CPT

## 2019-12-21 PROCEDURE — 36415 COLL VENOUS BLD VENIPUNCTURE: CPT

## 2019-12-23 ENCOUNTER — TELEPHONE (OUTPATIENT)
Dept: OBGYN CLINIC | Age: 29
End: 2019-12-23

## 2020-01-29 RX ORDER — METFORMIN HYDROCHLORIDE 500 MG/1
2000 TABLET, EXTENDED RELEASE ORAL DAILY
Qty: 120 TABLET | Refills: 1 | Status: SHIPPED | OUTPATIENT
Start: 2020-01-29 | End: 2020-04-06

## 2020-04-06 RX ORDER — METFORMIN HYDROCHLORIDE 500 MG/1
TABLET, EXTENDED RELEASE ORAL
Qty: 120 TABLET | Refills: 0 | Status: SHIPPED | OUTPATIENT
Start: 2020-04-06 | End: 2020-05-07

## 2020-05-07 RX ORDER — METFORMIN HYDROCHLORIDE 500 MG/1
TABLET, EXTENDED RELEASE ORAL
Qty: 120 TABLET | Refills: 0 | Status: SHIPPED | OUTPATIENT
Start: 2020-05-07 | End: 2020-06-06 | Stop reason: SDUPTHER

## 2020-05-13 ENCOUNTER — HOSPITAL ENCOUNTER (OUTPATIENT)
Age: 30
Setting detail: SPECIMEN
Discharge: HOME | End: 2020-05-13
Payer: COMMERCIAL

## 2020-05-13 ENCOUNTER — OFFICE VISIT (OUTPATIENT)
Dept: OBGYN CLINIC | Age: 30
End: 2020-05-13
Payer: COMMERCIAL

## 2020-05-13 VITALS
SYSTOLIC BLOOD PRESSURE: 116 MMHG | DIASTOLIC BLOOD PRESSURE: 74 MMHG | HEIGHT: 66 IN | WEIGHT: 293 LBS | BODY MASS INDEX: 47.09 KG/M2 | RESPIRATION RATE: 16 BRPM

## 2020-05-13 PROCEDURE — 99395 PREV VISIT EST AGE 18-39: CPT | Performed by: NURSE PRACTITIONER

## 2020-05-13 ASSESSMENT — ENCOUNTER SYMPTOMS
ABDOMINAL PAIN: 0
SHORTNESS OF BREATH: 0
DIARRHEA: 0
RHINORRHEA: 0
COUGH: 0
NAUSEA: 0
CONSTIPATION: 0
BACK PAIN: 0
VOMITING: 0
COLOR CHANGE: 0

## 2020-05-13 NOTE — PATIENT INSTRUCTIONS
pressure level to feel your breast tissue before moving on to the next spot. ? Check your entire breast, moving up and down as if following a strip from the collarbone to the bra line, and from the armpit to the ribs. Repeat until you have covered the entire breast.  ? Repeat this procedure for your left breast, using the pads of the 3 middle fingers of your right hand. · To examine your breasts while in the shower:  ? Place one arm over your head and lightly soap your breast on that side. ? Using the pads of your fingers, gently move your hand over your breast (in the strip pattern described above), feeling carefully for any lumps or changes. ? Repeat for the other breast.  · Have your doctor inspect anything you notice to see if you need further testing. Where can you learn more? Go to [email or web address]://Whale [email or web address].[email or web address] org and sign in to your FaceRig account. Enter P148 in the [employer] box to learn more about \"Breast Self-Exam: Care Instructions. \"     If you do not have an account, please click on the \"Sign Up Now\" link. Current as of: August 21, 2019Content Version: 12.4  © 4617-7645 [employer] Shared [employer]. Care instructions adapted under license by [employer]. If you have questions about a medical condition or this instruction, always ask your healthcare professional. [address] any warranty or liability for your use of this information. Pap Test: Care Instructions  Your Care Instructions    The Pap test (also called a Pap smear) is a screening test for cancer of the cervix, which is the lower part of the uterus that opens into the vagina. The test can help your doctor find early changes in the cells that could lead to cancer. The sample of cells taken during your test has been sent to a lab so that an expert can look at the cells. It usually takes a week or two to get the results back. Follow-up care is a key part of your treatment and safety. [employer]. Care instructions adapted under license by [facility] ([facility]). If you have questions about a medical condition or this instruction, always ask your healthcare professional. [address] any warranty or liability for your use of this information.

## 2020-05-13 NOTE — PROGRESS NOTES
Tobacco Use   Smoking Status Never Smoker   Smokeless Tobacco Never Used     Social History     Substance and Sexual Activity   Alcohol Use Yes    Comment: occ,        Social History     Tobacco History     Smoking Status  Never Smoker    Smokeless Tobacco Use  Never Used          Alcohol History     Alcohol Use Status  Yes Comment  occ,          Drug Use     Drug Use Status  Not Asked          Sexual Activity     Sexually Active  Not Asked              No Known Allergies  Current Outpatient Medications   Medication Sig Dispense Refill    metFORMIN (GLUCOPHAGE-XR) 500 MG extended release tablet TAKE FOUR TABLETS BY MOUTH DAILY 120 tablet 0    PARoxetine (PAXIL) 20 MG tablet Take 20 mg by mouth every morning       No current facility-administered medications for this visit. Subjective:     Review of Systems   Constitutional: Negative for chills, fatigue, fever and unexpected weight change. HENT: Negative for congestion and rhinorrhea. Eyes: Negative for visual disturbance. Respiratory: Negative for cough and shortness of breath. Cardiovascular: Negative for chest pain, palpitations and leg swelling. Gastrointestinal: Negative for abdominal pain, constipation, diarrhea, nausea and vomiting. Endocrine: Negative for cold intolerance, heat intolerance, polydipsia and polyuria. Genitourinary: Negative for dyspareunia, dysuria, flank pain, menstrual problem, pelvic pain, vaginal bleeding, vaginal discharge and vaginal pain. Musculoskeletal: Negative for back pain and myalgias. Skin: Negative for color change and rash. Neurological: Negative for dizziness, light-headedness and headaches. Hematological: Negative for adenopathy. Does not bruise/bleed easily. Psychiatric/Behavioral: Negative for self-injury and suicidal ideas. Objective:     Physical Exam  Vitals signs and nursing note reviewed. Constitutional:       General: She is not in acute distress.      Appearance: She is Basic Metabolic Panel    Hemoglobin A1C       Breast exam completed. Pelvic exam pap smear collected and sent. Cultures sent No    Plan:   Collect pap   BSE reviewed  STD prevention reviewed    Cultures declined   BC- no, will notify us if she decides wanting to be referred to fertility specialist again, or if + hcg. She is taking PNV with folic acid and  is taking coenzyme Q10 and fish oil. Insulin Resistance- take metformin as directed, check labs as ordered, encouraged weight loss  Refill medication if appropriate  Diet & Exercise reviewed with pt. Preventive  Health through PCP   RV prn/annual           Orders Placed This Encounter   Procedures    PAP Smear     Patient History:    No LMP recorded. OBGYN Status: Having periods  Past Surgical History:  05/2013: TONSILLECTOMY  12/2011: WISDOM TOOTH EXTRACTION      Social History    Tobacco Use      Smoking status: Never Smoker      Smokeless tobacco: Never Used       Standing Status:   Future     Standing Expiration Date:   5/13/2021     Order Specific Question:   Collection Type     Answer: Thin Prep     Order Specific Question:   Prior Abnormal Pap Test     Answer:   No     Order Specific Question:   Screening or Diagnostic     Answer:   Screening     Order Specific Question:   HPV Requested? Answer:   Yes -  If ASCUS Reflex HPV     Order Specific Question:   High Risk Patient     Answer:   N/A    Insulin, total     Patient must be fasting for 12-14 hrs     Standing Status:   Future     Standing Expiration Date:   6/12/2020    Basic Metabolic Panel     Standing Status:   Future     Standing Expiration Date:   5/13/2021    Hemoglobin A1C     Standing Status:   Future     Standing Expiration Date:   5/13/2021         Patient given educational materials - seepatient instructions. Discussed use, benefit, and side effects of prescribed medications. All patient questions answered. Pt voiced understanding. Reviewed health maintenance. Instructed

## 2020-05-18 LAB — CYTOLOGY REPORT: NORMAL

## 2020-05-29 ENCOUNTER — HOSPITAL ENCOUNTER (OUTPATIENT)
Age: 30
Discharge: HOME | End: 2020-05-29
Payer: COMMERCIAL

## 2020-05-29 LAB
ANION GAP SERPL CALCULATED.3IONS-SCNC: 18 MMOL/L (ref 9–17)
BUN BLDV-MCNC: 11 MG/DL (ref 6–20)
BUN/CREAT BLD: ABNORMAL (ref 9–20)
CALCIUM SERPL-MCNC: 9.2 MG/DL (ref 8.6–10.4)
CHLORIDE BLD-SCNC: 103 MMOL/L (ref 98–107)
CO2: 21 MMOL/L (ref 20–31)
CREAT SERPL-MCNC: 0.59 MG/DL (ref 0.5–0.9)
ESTIMATED AVERAGE GLUCOSE: 111 MG/DL
GFR AFRICAN AMERICAN: >60 ML/MIN
GFR NON-AFRICAN AMERICAN: >60 ML/MIN
GFR SERPL CREATININE-BSD FRML MDRD: ABNORMAL ML/MIN/{1.73_M2}
GFR SERPL CREATININE-BSD FRML MDRD: ABNORMAL ML/MIN/{1.73_M2}
GLUCOSE BLD-MCNC: 85 MG/DL (ref 70–99)
HBA1C MFR BLD: 5.5 % (ref 4–6)
INSULIN COMMENT: NORMAL
INSULIN REFERENCE RANGE:: NORMAL
INSULIN: 50.1 MU/L
POTASSIUM SERPL-SCNC: 4.7 MMOL/L (ref 3.7–5.3)
SODIUM BLD-SCNC: 142 MMOL/L (ref 135–144)

## 2020-05-29 PROCEDURE — 36415 COLL VENOUS BLD VENIPUNCTURE: CPT

## 2020-05-29 PROCEDURE — 83525 ASSAY OF INSULIN: CPT

## 2020-05-29 PROCEDURE — 80048 BASIC METABOLIC PNL TOTAL CA: CPT

## 2020-05-29 PROCEDURE — 83036 HEMOGLOBIN GLYCOSYLATED A1C: CPT

## 2020-06-08 PROBLEM — N97.0 ANOVULATION: Status: ACTIVE | Noted: 2020-06-08

## 2020-06-08 RX ORDER — METFORMIN HYDROCHLORIDE 500 MG/1
2000 TABLET, EXTENDED RELEASE ORAL
Qty: 120 TABLET | Refills: 3 | Status: SHIPPED | OUTPATIENT
Start: 2020-06-08 | End: 2020-10-05 | Stop reason: SDUPTHER

## 2020-06-29 ENCOUNTER — HOSPITAL ENCOUNTER (OUTPATIENT)
Age: 30
Discharge: HOME | End: 2020-06-29
Payer: COMMERCIAL

## 2020-06-29 LAB — PROGESTERONE LEVEL: 0.17 NG/ML

## 2020-06-29 PROCEDURE — 36415 COLL VENOUS BLD VENIPUNCTURE: CPT

## 2020-06-29 PROCEDURE — 84144 ASSAY OF PROGESTERONE: CPT

## 2020-07-10 ENCOUNTER — HOSPITAL ENCOUNTER (OUTPATIENT)
Age: 30
Discharge: HOME | End: 2020-07-10
Payer: COMMERCIAL

## 2020-07-10 LAB
HCG QUANTITATIVE: <1 IU/L
PROGESTERONE LEVEL: 3.93 NG/ML

## 2020-07-10 PROCEDURE — 84144 ASSAY OF PROGESTERONE: CPT

## 2020-07-10 PROCEDURE — 84702 CHORIONIC GONADOTROPIN TEST: CPT

## 2020-07-10 PROCEDURE — 36415 COLL VENOUS BLD VENIPUNCTURE: CPT

## 2020-08-14 ENCOUNTER — HOSPITAL ENCOUNTER (OUTPATIENT)
Age: 30
Discharge: HOME | End: 2020-08-14
Payer: COMMERCIAL

## 2020-08-14 LAB — PROGESTERONE LEVEL: 5.2 NG/ML

## 2020-08-14 PROCEDURE — 36415 COLL VENOUS BLD VENIPUNCTURE: CPT

## 2020-08-14 PROCEDURE — 84144 ASSAY OF PROGESTERONE: CPT

## 2020-08-26 ENCOUNTER — HOSPITAL ENCOUNTER (OUTPATIENT)
Age: 30
Discharge: HOME | End: 2020-08-26
Payer: COMMERCIAL

## 2020-08-26 LAB
HCG QUANTITATIVE: <1 IU/L
PROGESTERONE LEVEL: 2.81 NG/ML

## 2020-08-26 PROCEDURE — 84144 ASSAY OF PROGESTERONE: CPT

## 2020-08-26 PROCEDURE — 84702 CHORIONIC GONADOTROPIN TEST: CPT

## 2020-08-26 PROCEDURE — 36415 COLL VENOUS BLD VENIPUNCTURE: CPT

## 2020-09-18 ENCOUNTER — HOSPITAL ENCOUNTER (OUTPATIENT)
Age: 30
Discharge: HOME | End: 2020-09-18
Payer: COMMERCIAL

## 2020-09-18 LAB — PROGESTERONE LEVEL: 17.49 NG/ML

## 2020-09-18 PROCEDURE — 36415 COLL VENOUS BLD VENIPUNCTURE: CPT

## 2020-09-18 PROCEDURE — 84144 ASSAY OF PROGESTERONE: CPT

## 2020-09-25 ENCOUNTER — HOSPITAL ENCOUNTER (OUTPATIENT)
Age: 30
Discharge: HOME | End: 2020-09-25
Payer: COMMERCIAL

## 2020-09-25 LAB
HCG QUANTITATIVE: <1 IU/L
PROGESTERONE LEVEL: 5.3 NG/ML

## 2020-09-25 PROCEDURE — 84144 ASSAY OF PROGESTERONE: CPT

## 2020-09-25 PROCEDURE — 84702 CHORIONIC GONADOTROPIN TEST: CPT

## 2020-09-25 PROCEDURE — 36415 COLL VENOUS BLD VENIPUNCTURE: CPT

## 2020-10-05 RX ORDER — METFORMIN HYDROCHLORIDE 500 MG/1
2000 TABLET, EXTENDED RELEASE ORAL
Qty: 120 TABLET | Refills: 1 | Status: SHIPPED | OUTPATIENT
Start: 2020-10-05 | End: 2020-12-07

## 2020-10-21 ENCOUNTER — HOSPITAL ENCOUNTER (OUTPATIENT)
Age: 30
Discharge: HOME | End: 2020-10-21
Payer: COMMERCIAL

## 2020-10-21 LAB — PROGESTERONE LEVEL: 26.35 NG/ML

## 2020-10-21 PROCEDURE — 84144 ASSAY OF PROGESTERONE: CPT

## 2020-10-21 PROCEDURE — 36415 COLL VENOUS BLD VENIPUNCTURE: CPT

## 2020-10-27 ENCOUNTER — HOSPITAL ENCOUNTER (OUTPATIENT)
Age: 30
Discharge: HOME | End: 2020-10-27
Payer: COMMERCIAL

## 2020-10-27 LAB
HCG QUANTITATIVE: <1 IU/L
PROGESTERONE LEVEL: 8.79 NG/ML

## 2020-10-27 PROCEDURE — 36415 COLL VENOUS BLD VENIPUNCTURE: CPT

## 2020-10-27 PROCEDURE — 84702 CHORIONIC GONADOTROPIN TEST: CPT

## 2020-10-27 PROCEDURE — 84144 ASSAY OF PROGESTERONE: CPT

## 2020-12-07 RX ORDER — METFORMIN HYDROCHLORIDE 500 MG/1
TABLET, EXTENDED RELEASE ORAL
Qty: 120 TABLET | Refills: 0 | Status: SHIPPED | OUTPATIENT
Start: 2020-12-07 | End: 2021-01-06

## 2021-01-06 RX ORDER — METFORMIN HYDROCHLORIDE 500 MG/1
TABLET, EXTENDED RELEASE ORAL
Qty: 120 TABLET | Refills: 0 | Status: SHIPPED | OUTPATIENT
Start: 2021-01-06 | End: 2022-08-10 | Stop reason: SDUPTHER

## 2021-01-16 ENCOUNTER — HOSPITAL ENCOUNTER (OUTPATIENT)
Age: 31
Discharge: HOME | End: 2021-01-16
Payer: COMMERCIAL

## 2021-01-16 LAB
GLUCOSE FASTING: 86 MG/DL (ref 70–99)
INSULIN COMMENT: NORMAL
INSULIN REFERENCE RANGE:: NORMAL
INSULIN: 22.5 MU/L
TSH SERPL DL<=0.05 MIU/L-ACNC: 0.88 MIU/L (ref 0.3–5)

## 2021-01-16 PROCEDURE — 82947 ASSAY GLUCOSE BLOOD QUANT: CPT

## 2021-01-16 PROCEDURE — 36415 COLL VENOUS BLD VENIPUNCTURE: CPT

## 2021-01-16 PROCEDURE — 84443 ASSAY THYROID STIM HORMONE: CPT

## 2021-01-16 PROCEDURE — 83036 HEMOGLOBIN GLYCOSYLATED A1C: CPT

## 2021-01-16 PROCEDURE — 83525 ASSAY OF INSULIN: CPT

## 2021-01-17 LAB
ESTIMATED AVERAGE GLUCOSE: 111 MG/DL
HBA1C MFR BLD: 5.5 % (ref 4–6)

## 2021-03-08 RX ORDER — METFORMIN HYDROCHLORIDE 500 MG/1
TABLET, EXTENDED RELEASE ORAL
Qty: 120 TABLET | Refills: 1 | Status: SHIPPED | OUTPATIENT
Start: 2021-03-08 | End: 2021-05-07

## 2021-04-14 ENCOUNTER — HOSPITAL ENCOUNTER (OUTPATIENT)
Age: 31
Discharge: HOME | End: 2021-04-14
Payer: COMMERCIAL

## 2021-04-14 LAB — PROGESTERONE LEVEL: 32.56 NG/ML

## 2021-04-14 PROCEDURE — 36415 COLL VENOUS BLD VENIPUNCTURE: CPT

## 2021-04-14 PROCEDURE — 84144 ASSAY OF PROGESTERONE: CPT

## 2021-04-21 ENCOUNTER — HOSPITAL ENCOUNTER (OUTPATIENT)
Age: 31
Discharge: HOME | End: 2021-04-21
Payer: COMMERCIAL

## 2021-04-21 LAB
HCG QUANTITATIVE: <1 IU/L
PROGESTERONE LEVEL: 5.83 NG/ML

## 2021-04-21 PROCEDURE — 36415 COLL VENOUS BLD VENIPUNCTURE: CPT

## 2021-04-21 PROCEDURE — 84702 CHORIONIC GONADOTROPIN TEST: CPT

## 2021-04-21 PROCEDURE — 84144 ASSAY OF PROGESTERONE: CPT

## 2021-05-07 RX ORDER — METFORMIN HYDROCHLORIDE 500 MG/1
TABLET, EXTENDED RELEASE ORAL
Qty: 120 TABLET | Refills: 0 | Status: SHIPPED | OUTPATIENT
Start: 2021-05-07 | End: 2021-06-08

## 2021-06-08 RX ORDER — METFORMIN HYDROCHLORIDE 500 MG/1
TABLET, EXTENDED RELEASE ORAL
Qty: 120 TABLET | Refills: 0 | Status: SHIPPED | OUTPATIENT
Start: 2021-06-08 | End: 2021-07-12

## 2021-07-12 RX ORDER — METFORMIN HYDROCHLORIDE 500 MG/1
TABLET, EXTENDED RELEASE ORAL
Qty: 120 TABLET | Refills: 0 | Status: SHIPPED | OUTPATIENT
Start: 2021-07-12 | End: 2021-08-11

## 2021-07-14 ASSESSMENT — ENCOUNTER SYMPTOMS
COUGH: 0
COLOR CHANGE: 0
VOMITING: 0
ABDOMINAL PAIN: 0
NAUSEA: 0
BACK PAIN: 0
DIARRHEA: 0
SHORTNESS OF BREATH: 0
CONSTIPATION: 0

## 2021-07-14 NOTE — PROGRESS NOTES
Maura Bowie is a 36 y.o.  here for her annual exam.  The patient was seen and examined. The patients past medical, surgical, social and family history were reviewed. Current medications and allergies were reviewed, and documented in the chart. She is employed as . She is .      Exercise Yes  Diet Yes  Tobacco abuse No     Last PAP: May 2020- negative, hx of abnormal PAP no  Family hx uterine or ovarian cancer-deneis  Family hx of breast cancer -denies   family hx colon cancer -denies     Sexually active: yes - with , multiple partners: No, Dyspareunia: No, Vaginal discharge: no,  UTI symptoms: no, voiding difficulties: no, bowels regular:No bloating:no        Menstrual history:  Menarche age- 17, cycle every  28-30 days,  lasts 4-5 days. LMP: 23  Birth control: none     She has insulin resistance/PCOS she is currently on metformin denies any intolerances to it. Her and  have just recently been to [facility], Have failed IUI X2. She is now on synthroid per ADRIANO. OB History    Para Term  AB Living   0 0 0 0 0 0   SAB TAB Ectopic Molar Multiple Live Births   0 0 0   0         Vitals:    07/15/21 0908   BP: 116/74   Site: Left Upper Arm   Position: Sitting   Cuff Size: Large Adult   Resp: 16   Weight: 299 lb (135.6 kg)       Wt Readings from Last 3 Encounters:   07/15/21 299 lb (135.6 kg)   20 (!) 302 lb 2 oz (137 kg)   19 (!) 301 lb 2 oz (136.6 kg)     Past Medical History:   Diagnosis Date    PCOS (polycystic ovarian syndrome)                                                                    Past Surgical History:   Procedure Laterality Date    TONSILLECTOMY  2013    WISDOM TOOTH EXTRACTION  2011     History reviewed. No pertinent family history.   Social History     Tobacco Use   Smoking Status Never Smoker   Smokeless Tobacco Never Used     Social History     Substance and Sexual Activity   Alcohol Use Yes Comment: occ,        Social History     Tobacco History     Smoking Status  Never Smoker    Smokeless Tobacco Use  Never Used          Alcohol History     Alcohol Use Status  Yes Comment  occ,          Drug Use     Drug Use Status  Not Asked          Sexual Activity     Sexually Active  Not Asked              No Known Allergies  Current Outpatient Medications   Medication Sig Dispense Refill    levothyroxine (SYNTHROID) 88 MCG tablet TAKE 1 TABLET BY MOUTH IN THE MORNING IMMEDIATELY UPON ARISING. DELAY EATING/DRINKING FOR 30 MINUTES.  metFORMIN (GLUCOPHAGE-XR) 500 MG extended release tablet TAKE 4 TABLETS BY MOUTH EVERY DAY WITH BREAKFAST  120 tablet 0    PARoxetine (PAXIL) 20 MG tablet Take 20 mg by mouth every morning       No current facility-administered medications for this visit. Subjective:     Review of Systems   Constitutional: Negative for chills, fatigue, fever and unexpected weight change. Eyes: Negative for visual disturbance. Respiratory: Negative for cough and shortness of breath. Cardiovascular: Negative for chest pain, palpitations and leg swelling. Gastrointestinal: Negative for abdominal pain, constipation, diarrhea, nausea and vomiting. Endocrine: Negative for cold intolerance, heat intolerance, polydipsia and polyuria. Genitourinary: Negative for dyspareunia, dysuria, flank pain, menstrual problem, pelvic pain, vaginal bleeding, vaginal discharge and vaginal pain. Musculoskeletal: Negative for back pain and myalgias. Skin: Negative for color change and rash. Neurological: Negative for dizziness, light-headedness and headaches. Hematological: Negative for adenopathy. Does not bruise/bleed easily. Psychiatric/Behavioral: Negative for self-injury and suicidal ideas. Objective:     Physical Exam  Vitals and nursing note reviewed. Constitutional:       General: She is not in acute distress. Appearance: She is well-developed. She is not diaphoretic. HENT:      Head: Normocephalic and atraumatic. Right Ear: External ear normal.      Left Ear: External ear normal.      Nose: Nose normal.   Eyes:      Pupils: Pupils are equal, round, and reactive to light. Neck:      Thyroid: No thyromegaly. Cardiovascular:      Rate and Rhythm: Normal rate and regular rhythm. Heart sounds: Normal heart sounds. No murmur heard. No friction rub. No gallop. Comments: No bilateral calf tenderness or swelling  Pulmonary:      Effort: Pulmonary effort is normal. No respiratory distress. Breath sounds: Normal breath sounds. No wheezing. Abdominal:      General: Bowel sounds are normal.      Palpations: Abdomen is soft. Tenderness: There is no abdominal tenderness. Genitourinary:     Comments: Breasts nipples everted, no masses or tenderness, does BSE  Vulva-no lesions  Vagina-pink rugated  Cervix-firm, 2 cm. Nontender, freely movable, no lesions  Uterus-ant. Smooth, firm, nontender, freely movable  Adnexa-no masses or tenderness   Musculoskeletal:         General: Normal range of motion. Cervical back: Normal range of motion and neck supple. Lymphadenopathy:      Cervical: No cervical adenopathy. Skin:     General: Skin is warm and dry. Findings: No rash. Neurological:      Mental Status: She is alert and oriented to person, place, and time. Cranial Nerves: No cranial nerve deficit. Deep Tendon Reflexes: Reflexes are normal and symmetric. Psychiatric:         Behavior: Behavior normal.         Thought Content: Thought content normal.         Judgment: Judgment normal.       /74 (Site: Left Upper Arm, Position: Sitting, Cuff Size: Large Adult)   Resp 16   Wt 299 lb (135.6 kg)   LMP 06/20/2021   BMI 48.26 kg/m²     Assessment:       Diagnosis Orders   1. Women's annual routine gynecological examination  PAP Smear   2. Insulin resistance  Comprehensive Metabolic Panel   3.  Hypothyroidism, unspecified type  TSH With Reflex Ft4       Breast exam completed. Pelvic exam pap smear collected and sent. Cultures sent No    Plan:   Collect pap   BSE reviewed, Mammogram ordered: no  STD prevention reviewed  Gardisil counseling completed for all patients 10-35 yo  Cultures declined   Check cmp and tsh  Diet & Exercise reviewed with pt. Preventive  Health through PCP   RV prn/annual           Orders Placed This Encounter   Procedures    PAP Smear     Patient History:    Patient's last menstrual period was 06/10/2021 (approximate). OBGYN Status: Having periods  Past Surgical History:  05/2013: TONSILLECTOMY  12/2011: WISDOM TOOTH EXTRACTION      Social History    Tobacco Use      Smoking status: Never Smoker      Smokeless tobacco: Never Used       Standing Status:   Future     Standing Expiration Date:   7/15/2022     Order Specific Question:   Collection Type     Answer: Thin Prep     Order Specific Question:   Prior Abnormal Pap Test     Answer:   No     Order Specific Question:   Screening or Diagnostic     Answer:   Screening     Order Specific Question:   HPV Requested? Answer:   Yes -  If ASCUS Reflex HPV     Order Specific Question:   High Risk Patient     Answer:   N/A    Comprehensive Metabolic Panel     Standing Status:   Future     Number of Occurrences:   1     Standing Expiration Date:   7/15/2022    TSH With Reflex Ft4     Standing Status:   Future     Number of Occurrences:   1     Standing Expiration Date:   7/15/2022     No orders of the defined types were placed in this encounter. Patient given educational materials - seepatient instructions. Discussed use, benefit, and side effects of prescribed medications. All patient questions answered. Pt voiced understanding. Reviewed health maintenance. Instructed to continue current medications, diet and exercise. Patient agreedwith treatment plan. Follow up as directed.       Electronically signed by DAVIAN Tapia CNP on 7/15/2021at 10:26 AM

## 2021-07-15 ENCOUNTER — HOSPITAL ENCOUNTER (OUTPATIENT)
Age: 31
Setting detail: SPECIMEN
Discharge: HOME | End: 2021-07-15
Payer: COMMERCIAL

## 2021-07-15 ENCOUNTER — PATIENT MESSAGE (OUTPATIENT)
Dept: OBGYN CLINIC | Age: 31
End: 2021-07-15

## 2021-07-15 ENCOUNTER — OFFICE VISIT (OUTPATIENT)
Dept: OBGYN CLINIC | Age: 31
End: 2021-07-15

## 2021-07-15 VITALS
SYSTOLIC BLOOD PRESSURE: 116 MMHG | WEIGHT: 293 LBS | DIASTOLIC BLOOD PRESSURE: 74 MMHG | RESPIRATION RATE: 16 BRPM | BODY MASS INDEX: 48.26 KG/M2

## 2021-07-15 DIAGNOSIS — E88.81 INSULIN RESISTANCE: ICD-10-CM

## 2021-07-15 DIAGNOSIS — Z01.419 WOMEN'S ANNUAL ROUTINE GYNECOLOGICAL EXAMINATION: Primary | ICD-10-CM

## 2021-07-15 DIAGNOSIS — E03.9 HYPOTHYROIDISM, UNSPECIFIED TYPE: ICD-10-CM

## 2021-07-15 LAB
ALBUMIN SERPL-MCNC: 4 G/DL (ref 3.5–5.2)
ALBUMIN/GLOBULIN RATIO: 1.1 (ref 1–2.5)
ALP BLD-CCNC: 57 U/L (ref 35–104)
ALT SERPL-CCNC: 16 U/L (ref 5–33)
ANION GAP SERPL CALCULATED.3IONS-SCNC: 19 MMOL/L (ref 9–17)
AST SERPL-CCNC: 17 U/L
BILIRUB SERPL-MCNC: 0.32 MG/DL (ref 0.3–1.2)
BUN BLDV-MCNC: 12 MG/DL (ref 6–20)
BUN/CREAT BLD: ABNORMAL (ref 9–20)
CALCIUM SERPL-MCNC: 9.2 MG/DL (ref 8.6–10.4)
CHLORIDE BLD-SCNC: 102 MMOL/L (ref 98–107)
CO2: 17 MMOL/L (ref 20–31)
CREAT SERPL-MCNC: 0.79 MG/DL (ref 0.5–0.9)
GFR AFRICAN AMERICAN: >60 ML/MIN
GFR NON-AFRICAN AMERICAN: >60 ML/MIN
GFR SERPL CREATININE-BSD FRML MDRD: ABNORMAL ML/MIN/{1.73_M2}
GFR SERPL CREATININE-BSD FRML MDRD: ABNORMAL ML/MIN/{1.73_M2}
GLUCOSE BLD-MCNC: 82 MG/DL (ref 70–99)
POTASSIUM SERPL-SCNC: 4.4 MMOL/L (ref 3.7–5.3)
SODIUM BLD-SCNC: 138 MMOL/L (ref 135–144)
TOTAL PROTEIN: 7.6 G/DL (ref 6.4–8.3)
TSH SERPL DL<=0.05 MIU/L-ACNC: 0.58 MIU/L (ref 0.3–5)

## 2021-07-15 PROCEDURE — 99395 PREV VISIT EST AGE 18-39: CPT | Performed by: NURSE PRACTITIONER

## 2021-07-15 RX ORDER — LEVOTHYROXINE SODIUM 88 UG/1
TABLET ORAL
COMMUNITY
Start: 2021-07-02 | End: 2022-01-28 | Stop reason: SDUPTHER

## 2021-07-15 NOTE — PATIENT INSTRUCTIONS
Patient Education        Pap Test: Care Instructions  Overview     The Pap test (also called a Pap smear) is a screening test for cancer of the cervix, which is the lower part of the uterus that opens into the vagina. The test can help your doctor find early changes in the cells that could lead to cancer. The sample of cells taken during your test has been sent to a lab so that an expert can look at the cells. It usually takes a week or two to get the results back. Follow-up care is a key part of your treatment and safety. Be sure to make and go to all appointments, and call your doctor if you are having problems. It's also a good idea to know your test results and keep a list of the medicines you take. What do the results mean? · A normal result means that the test did not find any abnormal cells in the sample. · An abnormal result can mean many things. Most of these are not cancer. The results of your test may be abnormal because:  ? You have an infection of the vagina or cervix, such as a yeast infection. ? You have an IUD (intrauterine device for birth control). ? You have low estrogen levels after menopause that are causing the cells to change. ? You have cell changes that may be a sign of precancer or cancer. The results are ranked based on how serious the changes might be. There are many other reasons why you might not get a normal result. If the results were abnormal, you may need to get another test within a few weeks or months. If the results show changes that could be a sign of cancer, you may need a test called a colposcopy, which provides a more complete view of the cervix. Sometimes the lab cannot use the sample because it does not contain enough cells or was not preserved well. If so, you may need to have the test again. This is not common, but it does happen from time to time. When should you call for help?   Watch closely for changes in your health, and be sure to contact your doctor if:    · You have vaginal bleeding or pain for more than 2 days after the test. It is normal to have a small amount of bleeding for a day or two after the test.   Where can you learn more? Go to [email or web address][email or web address] org and sign in to your [employer] account. Enter U212 in the [employer] box to learn more about \"Pap Test: Care Instructions. \"     If you do not have an account, please click on the \"Sign Up Now\" link. Current as of: December 17, 2020               Content Version: 12.9  © 1434-8711 [employer], [employer]. Care instructions adapted under license by [facility] ([facility]). If you have questions about a medical condition or this instruction, always ask your healthcare professional. [address] any warranty or liability for your use of this information.

## 2021-07-16 NOTE — TELEPHONE ENCOUNTER
From: Vero Akhtar  To: DAVIAN Woo - CNP  Sent: 7/15/2021 11:18 PM EDT  Subject: Test Results Question    Bhavin Omer,     Are the CO2 and Anion Gap levels concerning? I see the CO2 was low and the Anion Gap was high and I wasn't really sure what those meant. Thanks!

## 2021-07-22 LAB — CYTOLOGY REPORT: NORMAL

## 2021-08-11 RX ORDER — METFORMIN HYDROCHLORIDE 500 MG/1
TABLET, EXTENDED RELEASE ORAL
Qty: 120 TABLET | Refills: 5 | Status: SHIPPED | OUTPATIENT
Start: 2021-08-11 | End: 2021-12-22 | Stop reason: ALTCHOICE

## 2021-10-28 ENCOUNTER — PATIENT MESSAGE (OUTPATIENT)
Dept: OBGYN CLINIC | Age: 31
End: 2021-10-28

## 2021-10-28 DIAGNOSIS — Z31.69 ENCOUNTER FOR PRECONCEPTION CONSULTATION: Primary | ICD-10-CM

## 2021-10-28 DIAGNOSIS — N97.0 ANOVULATION: ICD-10-CM

## 2021-10-29 ENCOUNTER — HOSPITAL ENCOUNTER (OUTPATIENT)
Age: 31
Discharge: HOME | End: 2021-10-29
Payer: COMMERCIAL

## 2021-10-29 DIAGNOSIS — N97.0 ANOVULATION: ICD-10-CM

## 2021-10-29 DIAGNOSIS — Z31.69 ENCOUNTER FOR PRECONCEPTION CONSULTATION: ICD-10-CM

## 2021-10-29 LAB
ABO/RH: NORMAL
ABSOLUTE EOS #: 0.09 K/UL (ref 0–0.44)
ABSOLUTE IMMATURE GRANULOCYTE: <0.03 K/UL (ref 0–0.3)
ABSOLUTE LYMPH #: 2.76 K/UL (ref 1.1–3.7)
ABSOLUTE MONO #: 0.49 K/UL (ref 0.1–1.2)
ALBUMIN SERPL-MCNC: 4.2 G/DL (ref 3.5–5.2)
ALBUMIN/GLOBULIN RATIO: 1.3 (ref 1–2.5)
ALP BLD-CCNC: 57 U/L (ref 35–104)
ALT SERPL-CCNC: 12 U/L (ref 5–33)
ANION GAP SERPL CALCULATED.3IONS-SCNC: 16 MMOL/L (ref 9–17)
ANTIBODY SCREEN: NEGATIVE
AST SERPL-CCNC: 15 U/L
BASOPHILS # BLD: 1 % (ref 0–2)
BASOPHILS ABSOLUTE: 0.04 K/UL (ref 0–0.2)
BILIRUB SERPL-MCNC: 0.31 MG/DL (ref 0.3–1.2)
BUN BLDV-MCNC: 15 MG/DL (ref 6–20)
BUN/CREAT BLD: ABNORMAL (ref 9–20)
CALCIUM SERPL-MCNC: 9.4 MG/DL (ref 8.6–10.4)
CHLORIDE BLD-SCNC: 98 MMOL/L (ref 98–107)
CO2: 20 MMOL/L (ref 20–31)
CREAT SERPL-MCNC: 0.66 MG/DL (ref 0.5–0.9)
DIFFERENTIAL TYPE: ABNORMAL
EOSINOPHILS RELATIVE PERCENT: 1 % (ref 1–4)
GFR AFRICAN AMERICAN: >60 ML/MIN
GFR NON-AFRICAN AMERICAN: >60 ML/MIN
GFR SERPL CREATININE-BSD FRML MDRD: ABNORMAL ML/MIN/{1.73_M2}
GFR SERPL CREATININE-BSD FRML MDRD: ABNORMAL ML/MIN/{1.73_M2}
GLUCOSE BLD-MCNC: 78 MG/DL (ref 70–99)
HCT VFR BLD CALC: 40.8 % (ref 36.3–47.1)
HEMOGLOBIN: 12.8 G/DL (ref 11.9–15.1)
HEPATITIS B SURFACE ANTIGEN: NONREACTIVE
HEPATITIS C ANTIBODY: NONREACTIVE
HISTORY CHECK: NORMAL
IMMATURE GRANULOCYTES: 0 %
LYMPHOCYTES # BLD: 44 % (ref 24–43)
MCH RBC QN AUTO: 26.6 PG (ref 25.2–33.5)
MCHC RBC AUTO-ENTMCNC: 31.4 G/DL (ref 28.4–34.8)
MCV RBC AUTO: 84.8 FL (ref 82.6–102.9)
MONOCYTES # BLD: 8 % (ref 3–12)
NRBC AUTOMATED: 0 PER 100 WBC
PDW BLD-RTO: 15.5 % (ref 11.8–14.4)
PLATELET # BLD: 421 K/UL (ref 138–453)
PLATELET ESTIMATE: ABNORMAL
PMV BLD AUTO: 9.7 FL (ref 8.1–13.5)
POTASSIUM SERPL-SCNC: 4.3 MMOL/L (ref 3.7–5.3)
RBC # BLD: 4.81 M/UL (ref 3.95–5.11)
RBC # BLD: ABNORMAL 10*6/UL
RUBV IGG SER QL: 59.8 IU/ML
SEG NEUTROPHILS: 46 % (ref 36–65)
SEGMENTED NEUTROPHILS ABSOLUTE COUNT: 2.9 K/UL (ref 1.5–8.1)
SODIUM BLD-SCNC: 134 MMOL/L (ref 135–144)
T. PALLIDUM, IGG: NONREACTIVE
TESTOSTERONE TOTAL: 33 NG/DL (ref 20–70)
TOTAL PROTEIN: 7.5 G/DL (ref 6.4–8.3)
TSH SERPL DL<=0.05 MIU/L-ACNC: 0.52 MIU/L (ref 0.3–5)
VITAMIN D 25-HYDROXY: 22.7 NG/ML (ref 30–100)
WBC # BLD: 6.3 K/UL (ref 3.5–11.3)
WBC # BLD: ABNORMAL 10*3/UL

## 2021-10-29 PROCEDURE — 80053 COMPREHEN METABOLIC PANEL: CPT

## 2021-10-29 PROCEDURE — 84146 ASSAY OF PROLACTIN: CPT

## 2021-10-29 PROCEDURE — 83520 IMMUNOASSAY QUANT NOS NONAB: CPT

## 2021-10-29 PROCEDURE — 86850 RBC ANTIBODY SCREEN: CPT

## 2021-10-29 PROCEDURE — 84443 ASSAY THYROID STIM HORMONE: CPT

## 2021-10-29 PROCEDURE — 82306 VITAMIN D 25 HYDROXY: CPT

## 2021-10-29 PROCEDURE — 86900 BLOOD TYPING SEROLOGIC ABO: CPT

## 2021-10-29 PROCEDURE — 86901 BLOOD TYPING SEROLOGIC RH(D): CPT

## 2021-10-29 PROCEDURE — 84403 ASSAY OF TOTAL TESTOSTERONE: CPT

## 2021-10-29 PROCEDURE — 86803 HEPATITIS C AB TEST: CPT

## 2021-10-29 PROCEDURE — 87389 HIV-1 AG W/HIV-1&-2 AB AG IA: CPT

## 2021-10-29 PROCEDURE — 86762 RUBELLA ANTIBODY: CPT

## 2021-10-29 PROCEDURE — 87340 HEPATITIS B SURFACE AG IA: CPT

## 2021-10-29 PROCEDURE — 85025 COMPLETE CBC W/AUTO DIFF WBC: CPT

## 2021-10-29 PROCEDURE — 86787 VARICELLA-ZOSTER ANTIBODY: CPT

## 2021-10-29 PROCEDURE — 86780 TREPONEMA PALLIDUM: CPT

## 2021-10-30 LAB
HIV AG/AB: NONREACTIVE
PROLACTIN: 11.76 UG/L (ref 4.79–23.3)

## 2021-11-01 LAB — VZV IGG SER QL IA: 2.14

## 2021-11-02 LAB — ANTI-MULLERIAN HORMONE: 3.26 NG/ML (ref 0.4–16.02)

## 2021-11-22 ENCOUNTER — HOSPITAL ENCOUNTER (OUTPATIENT)
Age: 31
Discharge: HOME | End: 2021-11-22
Payer: COMMERCIAL

## 2021-11-22 DIAGNOSIS — N91.2 AMENORRHEA: ICD-10-CM

## 2021-11-22 DIAGNOSIS — N91.2 AMENORRHEA: Primary | ICD-10-CM

## 2021-11-22 LAB — HCG QUANTITATIVE: 2193 IU/L

## 2021-11-22 PROCEDURE — 36415 COLL VENOUS BLD VENIPUNCTURE: CPT

## 2021-11-22 PROCEDURE — 84702 CHORIONIC GONADOTROPIN TEST: CPT

## 2021-11-22 NOTE — PROGRESS NOTES
Pt called in with 5+ pregnancy test lmp 10/19/21- stated can order hcg level to determine quant level- then we can get scheduled for IPV and ultrasound. Pt stated she understood.

## 2021-11-26 ENCOUNTER — HOSPITAL ENCOUNTER (OUTPATIENT)
Age: 31
Discharge: HOME | End: 2021-11-26
Payer: COMMERCIAL

## 2021-11-26 DIAGNOSIS — Z34.90 EARLY STAGE OF PREGNANCY: Primary | ICD-10-CM

## 2021-11-26 DIAGNOSIS — Z34.90 EARLY STAGE OF PREGNANCY: ICD-10-CM

## 2021-11-26 LAB — PROGESTERONE LEVEL: 13.32 NG/ML

## 2021-11-26 PROCEDURE — 84144 ASSAY OF PROGESTERONE: CPT

## 2021-11-26 PROCEDURE — 36415 COLL VENOUS BLD VENIPUNCTURE: CPT

## 2021-11-29 ENCOUNTER — PATIENT MESSAGE (OUTPATIENT)
Dept: OBGYN CLINIC | Age: 31
End: 2021-11-29

## 2021-11-29 NOTE — TELEPHONE ENCOUNTER
From: Watson Coleman  To: Nicole Verona  Sent: 11/29/2021 1:55 PM EST  Subject: Medication    Hello. I was previously prescribed Actos by my RE and I had continued taking it. Should I stop taking this medication? I am also taking metformin.

## 2021-12-22 ENCOUNTER — INITIAL PRENATAL (OUTPATIENT)
Dept: OBGYN CLINIC | Age: 31
End: 2021-12-22

## 2021-12-22 ENCOUNTER — HOSPITAL ENCOUNTER (OUTPATIENT)
Age: 31
Setting detail: SPECIMEN
Discharge: HOME | End: 2021-12-22

## 2021-12-22 VITALS
BODY MASS INDEX: 45.88 KG/M2 | WEIGHT: 284.25 LBS | SYSTOLIC BLOOD PRESSURE: 118 MMHG | DIASTOLIC BLOOD PRESSURE: 74 MMHG

## 2021-12-22 DIAGNOSIS — Z3A.09 9 WEEKS GESTATION OF PREGNANCY: ICD-10-CM

## 2021-12-22 DIAGNOSIS — O99.280 HYPOTHYROIDISM AFFECTING PREGNANCY, ANTEPARTUM: ICD-10-CM

## 2021-12-22 DIAGNOSIS — Z34.91 NORMAL PREGNANCY IN FIRST TRIMESTER: ICD-10-CM

## 2021-12-22 DIAGNOSIS — E03.9 HYPOTHYROIDISM AFFECTING PREGNANCY, ANTEPARTUM: ICD-10-CM

## 2021-12-22 DIAGNOSIS — Z34.91 NORMAL PREGNANCY IN FIRST TRIMESTER: Primary | ICD-10-CM

## 2021-12-22 DIAGNOSIS — E88.81 INSULIN RESISTANCE: ICD-10-CM

## 2021-12-22 PROBLEM — F41.9 ANXIETY: Status: ACTIVE | Noted: 2017-05-02

## 2021-12-22 PROBLEM — G43.909 MIGRAINE: Status: ACTIVE | Noted: 2017-05-02

## 2021-12-22 LAB
AMPHETAMINE SCREEN URINE: NEGATIVE
BARBITURATE SCREEN URINE: NEGATIVE
BENZODIAZEPINE SCREEN, URINE: NEGATIVE
BILIRUBIN URINE: NEGATIVE
BUPRENORPHINE URINE: NORMAL
CANNABINOID SCREEN URINE: NEGATIVE
COCAINE METABOLITE, URINE: NEGATIVE
COLOR: YELLOW
COMMENT UA: NORMAL
GLUCOSE URINE: NEGATIVE
KETONES, URINE: NEGATIVE
LEUKOCYTE ESTERASE, URINE: NEGATIVE
MDMA URINE: NORMAL
METHADONE SCREEN, URINE: NEGATIVE
METHAMPHETAMINE, URINE: NORMAL
NITRITE, URINE: NEGATIVE
OPIATES, URINE: NEGATIVE
OXYCODONE SCREEN URINE: NEGATIVE
PH UA: 6 (ref 5–8)
PHENCYCLIDINE, URINE: NEGATIVE
PROPOXYPHENE, URINE: NORMAL
PROTEIN UA: NEGATIVE
SPECIFIC GRAVITY UA: 1.01 (ref 1–1.03)
TEST INFORMATION: NORMAL
TRICYCLIC ANTIDEPRESSANTS, UR: NORMAL
TURBIDITY: CLEAR
URINE HGB: NEGATIVE
UROBILINOGEN, URINE: NORMAL

## 2021-12-22 PROCEDURE — 0500F INITIAL PRENATAL CARE VISIT: CPT | Performed by: NURSE PRACTITIONER

## 2021-12-22 NOTE — PATIENT INSTRUCTIONS
After Hours Number: You can call the office [phone]  or [phone]  Call if you have:  1. Bleeding like a period  2. Cramps or contractions greater than 2 hours  3. If you are leaking fluid  4. If you've a fever greater than 100°  5. If you feel as if baby is not moving  6. If you have continuous vomiting over 3-4 hours        Patient was counseled on weight gain in pregnancy with the following recommendation made based on her BMI:     Singletons:  BMI <18.5: 28-40 lbs weight gain  BMI 18.5-24.9: 25-35 lbs weight gain   BMI 25-29.9: 15-25 lbs weight gain  BMI >/= 30: 11-20 lbs weight gain    Up to 16 weeks around 1 pound a month  16-28 weeks- 2-4 pounds a month  >28 weeks - around 1 pound a week due to increased growth and blood volume      Twins:  BMI <18.5: no reccomendations  BMI 18.5-24.9: 37-45 lbs weight gain  BMI 25-29.9: 31-50 lbs weight gain  BMI >/= 30: 25-42 lbs weight gain    During Pregnancy: Exercises  Your Care Instructions  Here are some examples of exercises to do during your pregnancy. Start each exercise slowly. Ease off the exercise if you start to have pain. Your doctor or physical therapist will tell you when you can start these exercises and which ones will work best for you. How to do the exercises  Neck rotation    1. Sit in a firm chair, or stand up straight. 2. Keeping your chin level, turn your head to the right, and hold for 15 to 30 seconds. 3. Turn your head to the left and hold for 15 to 30 seconds. 4. Repeat 2 to 4 times to each side. Forward neck flexion    1. Sit in a firm chair, or stand up straight. 2. Bend your head forward. 3. Hold for 15 to 30 seconds. 4. Repeat 2 to 4 times. Back press    1. Place your feet 10 to 12 inches from the wall. 2. Rest your back flat against the wall and slide down the wall until your knees are slightly bent. 3. Press your lower back against the wall by pulling in your stomach muscles.   4. Hold for 6 seconds, and then relax your stomach muscles and slide back up the wall. 5. Repeat 8 to 12 times. Full body twist    1. Sit with your legs crossed. 2. Reach your left hand toward your right foot, and place your right hand at your side for support. 3. Slowly twist your torso to your right. 4. Switch your hands and twist to your left. 5. Repeat 2 to 4 times. Pelvic rocking    1. Kneeling on hands and knees, place your hands directly under your shoulders and your knees under your hips. 2. Breathe in deeply. Tuck your head downward and round your back up, making a curve with your back in the shape of the letter C. Hold this position for a count of 6.  3. Breathe out slowly and bring your head back up. Relax, keeping your back straight (don't allow it to curve toward the floor). Hold this for a count of 6.  4. Do this exercise 8 times or to your comfort level. Pelvic tilt    1. Lie on your back. 2. Keep your knees relaxed. 3. Tighten your belly and buttocks muscles. 4. At the same time, gently shift your pelvis upward. This should flatten the curve in your back. 5. Hold for 6 seconds and then relax. 6. Gradually increase the number of tilts you do each day, to your comfort level. Backward stretch    1. Kneel on hands and knees with your knees 8 to 10 inches apart, hands directly under your shoulders, and arms and back straight. 2. Keeping your arms straight, slowly lower your buttocks toward your heels and tuck your head toward your knees. Hold for 15 to 30 seconds. 3. Slowly return to the kneeling position. 4. Repeat 2 to 4 times. Forward bend    1. Sit comfortably in a chair, with your arms relaxed. 2. Slowly bend forward, allowing your arms to hang down in front of you. Lean only as far as you can without feeling discomfort or pressure on your belly. 3. Hold for 15 to 30 seconds and then slowly sit up straight. 4. Repeat 2 to 4 times or to your comfort level. Leg lift crawl    1.  Kneeling on hands and eat meat, pick lower-fat types. Good choices include lean cuts of meat and chicken or turkey without the skin. · Do not eat shark, swordfish, josselyn mackerel, or tilefish. They have high levels of mercury, which is dangerous to your baby. You can eat up to 12 ounces a week of fish or shellfish that have low mercury levels. Good choices include shrimp, wild salmon, pollock, and catfish. Do not eat more than 6 ounces of tuna each week. · Heat lunch meats (such as turkey, ham, or bologna) to 165°F before you eat them. This reduces your risk of getting sick from a kind of bacteria that can be found in lunch meats. · Do not eat unpasteurized soft cheeses, such as brie, feta, fresh mozzarella, and blue cheese. They have a bacteria that could harm your baby. · Limit caffeine. If you drink coffee or tea, have no more than 1 cup a day. Caffeine is also found in brook. · Do not drink any alcohol. No amount of alcohol has been found to be safe during pregnancy. · Do not diet or try to lose weight. For example, do not follow a low-carbohydrate diet. If you are overweight at the start of your pregnancy, your doctor will work with you to manage your weight gain. · Tell your doctor about all vitamins and supplements you take. When should you call for help? Watch closely for changes in your health, and be sure to contact your doctor if you have any problems. Where can you learn more? Go to https://LiBfaisalWealthfront.healthGlobal Crossing. org and sign in to your Artsy account. Enter Y785 in the [employer] box to learn more about \"Nutrition During Pregnancy: Care Instructions. \"     If you do not have an account, please click on the \"Sign Up Now\" link. Current as of: September 5, 2018  Content Version: 12.0  © 0603-0593 Healthwise, Incorporated. Care instructions adapted under license by [facility] ([facility]).  If you have questions about a medical condition or this instruction, always ask your healthcare professional. [employer] disclaims any warranty or liability for your use of this information. Managing Morning Sickness: Care Instructions  Your Care Instructions    For many women, the toughest part of early pregnancy is morning sickness. Morning sickness can range from mild nausea to severe nausea with bouts of vomiting. Symptoms may be worse in the morning, although they can strike at any time of the day or night. If you have nausea, vomiting, or both, look for safe measures that can bring you relief. You can take simple steps at home to manage morning sickness. These steps include changing what and when you eat and avoiding certain foods and smells. Some women find that acupuncture and acupressure wristbands also help. Follow-up care is a key part of your treatment and safety. Be sure to make and go to all appointments, and call your doctor if you are having problems. It's also a good idea to know your test results and keep a list of the medicines you take. How can you care for yourself at home? · Keep food in your stomach, but not too much at once. Your nausea may be worse if your stomach is empty. Eat five or six small meals a day instead of three large meals. · For morning nausea, eat a small snack, such as a couple of crackers or dry biscuits, before rising. Allow a few minutes for your stomach to settle before you get out of bed slowly. · Drink plenty of fluids, enough so that your urine is light yellow or clear like water. If you have kidney, heart, or liver disease and have to limit fluids, talk with your doctor before you increase the amount of fluids you drink. Some women find that peppermint tea helps with nausea. · Eat more protein, such as chicken, fish, lean meat, beans, nuts, and seeds. · Eat carbohydrate foods, such as potatoes, whole-grain cereals, rice, and pasta. · Avoid smells and foods that make you feel nauseated.  Spicy or high-fat foods, citrus juice, milk, coffee, and tea with any warranty or liability for your use of this information. Breastfeeding: Care Instructions  Overview      Breastfeeding has many benefits. It may lower your baby's chances of getting an infection. It also may make it less likely that your baby will have problems such as diabetes and obesity later in life. Breastfeeding also helps you bond with your baby. In the first days after birth, your breasts make a thick, yellow liquid called colostrum. This liquid gives your baby nutrients and antibodies against infection. It is all that babies need in the first days after birth. Your breasts will fill with milk a few days after the birth. Breastfeeding is a skill that gets better with practice. Be patient with yourself and your baby. If you have trouble, you can get help and keep breastfeeding. Follow-up care is a key part of your treatment and safety. Be sure to make and go to all appointments, and call your doctor if you are having problems. It's also a good idea to know your test results and keep a list of the medicines you take. How can you care for yourself at home? · Breastfeed your baby whenever he or she is hungry. In the first 2 weeks, your baby will feed about every 1 to 3 hours. That often works out to about 8 to 12 times in a 24-hour period. This will help you keep up your supply of milk. Signs that your baby is hungry include:  ? Sucking on his or her hands. ? Hollis his or her lips. ? Turning his or her head toward your breast.  · Put a bed pillow or a nursing pillow on your lap to support your arms and your baby. · Hold your baby in a comfortable position. ? You can hold your baby in several ways. One of the most common positions is the cradle hold. One arm supports your baby, with his or her head in the bend of your elbow. Your open hand supports your baby's bottom or back. Your baby's belly lies against yours. ? If you had your baby by , or , try the football hold.  This position keeps your baby off your belly. Tuck your baby under your arm, with his or her body along the side you will be feeding on. Support your baby's upper body with your arm. With that hand you can control your baby's head to bring his or her mouth to your breast.  ? Try different positions with each feeding. If you are having problems, ask for help from your doctor or a lactation consultant. · To get your baby to latch on:  ? Support and narrow your breast with one hand using a \"U hold,\" with your thumb on the outer side of your breast and your fingers on the inner side. You can also use a \"C hold,\" with all your fingers below the nipple and your thumb above it. Try the different holds to get the deepest latch for whichever breastfeeding position you use. Your other arm is behind your baby's back, with your hand supporting the base of the baby's head. Position your fingers and thumb to point toward your baby's ears. ? You can touch your baby's lower lip with your nipple to get your baby to open his or her mouth. Wait until your baby opens up really wide, like a big yawn. Then be sure to bring the baby quickly to your breast--not your breast to the baby. As you bring your baby toward your breast, use your other hand to support the breast and guide it into his or her mouth. ? Both the nipple and a large portion of the darker area around the nipple (areola) should be in the baby's mouth. The baby's lips should be flared outward, not folded in (inverted). ? Listen for a regular sucking and swallowing pattern while the baby is feeding. If you cannot see or hear a swallowing pattern, watch the baby's ears, which will wiggle slightly when the baby swallows. If the baby's nose appears to be blocked by your breast, bring your baby's body closer to you. This will help tilt the baby's head back slightly, so just the edge of one nostril is clear for breathing. ?  When your baby is latched, you can usually remove your hand from supporting your breast and bring it under your baby to cradle him or her. Now just relax and breastfeed your baby. · You will know that your baby is feeding well when:  ? His or her mouth covers a lot of the areola, and the lips are flared out.  ? His or her chin and nose rest against your breast.  ? Sucking is deep and rhythmic, with short pauses. ? You are able to see and hear your baby swallowing. ? You do not feel pain in your nipple. · Offer both breasts to your baby at each feeding. Each time you breastfeed, switch which breast you start with. · Anytime you need to remove your baby from the breast, put one finger in the corner of his or her mouth. Push your finger between your baby's gums to gently break the seal. If you do not break the tight seal before you remove your baby, your nipples can become sore, cracked, or bruised. · After feeding your baby, gently pat his or her back to let out any swallowed air. After your baby burps, offer the breast again, or offer the other breast. Sometimes a baby will want to keep feeding after being burped. When should you call for help? Call your doctor now or seek immediate medical care if:    · You have symptoms of a breast infection, such as:  ? Increased pain, swelling, redness, or warmth around a breast.  ? Red streaks extending from the breast.  ? Pus draining from a breast.  ? A fever. · Your baby has no wet diapers for 6 hours. Watch closely for changes in your health, and be sure to contact your doctor if:    · Your baby has trouble latching on to your breast.     · You continue to have pain or discomfort when breastfeeding. · You have other questions or concerns. Where can you learn more? Go to https://Pivot MedicalfaisalMVERSE.Travel Notes. org and sign in to your Owlr account. Enter P492 in the [employer] box to learn more about \"Breastfeeding: Care Instructions. \"     If you do not have an account, please click on the \"Sign Up Now\" link. Current as of: September 5, 2018  Content Version: 12.0  © 9662-1222 [employer]ia. Care instructions adapted under license by [facility] ([facility]). If you have questions about a medical condition or this instruction, always ask your healthcare professional. [address] any warranty or liability for your use of this information. Learning About Birth Defects Testing  What is birth defects testing? Birth defects testing is done during pregnancy to look for possible problems with the baby (fetus). A birth defect may have only a minor impact on a child's life. Or it can have a major effect on quality or length of life. You and your doctor can choose from many tests. You may have no tests, one test, or many tests. Talking with a genetic counselor may help you make decisions about testing. The counselor is trained to help you understand these tests. He or she can also help you find resources for support. What are the types of tests? You may have a screening test or a diagnostic test. Or you may have both types of tests. Screening tests show the chance that a baby has a certain birth defect, such as Down syndrome, spina bifida, or trisomy 25. Diagnostic tests show if a baby has a certain birth defect. · Screening tests and when they are done:  ? Blood tests at 10 to 13 weeks (first trimester)  ? Cell free fetal DNA test at 10 weeks or later (first trimester)  ? Nuchal translucency test at 11 to 14 weeks (first trimester)  ? Triple or quad screening at 15 to 20 weeks (second trimester)  ? Ultrasound at 18 to 20 weeks (second trimester)  · Diagnostic tests and when they are done:  ? Chorionic villus sampling (CVS) at 10 to 13 weeks (first trimester)  ? Amniocentesis at 13 to 20 weeks (second trimester)  In some cases, the doctors look at the combined screenings that you've had over a period of time. This is called an integrated screening.   What are the screening tests?  · Blood tests are done to look at different substances in your blood. These tests include cell free fetal DNA and triple or quadruple (quad) blood tests. Both of these tests can help find genetic problems. · Nuchal translucency test uses ultrasound to measure the thickness of the area at the back of the baby's neck. An increase in thickness can be an early sign of certain birth defects. Ultrasound is a tool that uses sound waves to make pictures of your baby and placenta inside the uterus. · Ultrasound lets your doctor see an image of your baby. It can help your doctor look for problems of the heart, spine, belly, or other areas. What are the diagnostic tests? Chorionic villus sampling (CVS) looks at cells from the placenta. To do the test, your doctor may put a thin tube through your vagina and cervix to take out a small piece of the placenta. Or the doctor may take out the piece through a needle in your belly. This test can diagnose many genetic diseases. But it can't find problems with the spinal cord. Amniocentesis looks at the amniotic fluid that surrounds your baby. Your doctor will put a needle through your belly into your uterus and take out a very small amount of fluid to test.  What are the risks of these tests? There is a small risk of a miscarriage after a CVS or amniocentesis. Your doctor or genetic counselor can help you understand this risk. These tests are generally very safe. Where can you learn more? Go to https://Sustainable Life MediapeCrowned Grace International.CubeSensors. org and sign in to your Increo Solutions account. Enter G030 in the [facility] box to learn more about \"Learning About Birth Defects Testing. \"     If you do not have an account, please click on the \"Sign Up Now\" link. Current as of: September 5, 2018  Content Version: 12.0  © 0059-4393 Healthwise, Incorporated. Care instructions adapted under license by [facility] ([facility]).  If you have questions about a medical condition or this instruction, always ask your healthcare professional. [place] [address] any warranty or liability for your use of this information.

## 2021-12-22 NOTE — PROGRESS NOTES
OB History    Para Term  AB Living   1 0 0 0 0 0   SAB IAB Ectopic Molar Multiple Live Births   0 0 0   0        # Outcome Date GA Lbr Osiel/2nd Weight Sex Delivery Anes PTL Lv   1 Current               Kumar Hurtado is being seen today for her new obstetrical visit. The pregnancy is  a planned pregnancy. She is  accepting at this time. Her last period was Patient's last menstrual period was 10/18/2021. .  This was a sure and definite menses. She was not on OCP at conception. Gestation 9w2d  Estimated Date of Delivery: 22  Last PAP 2021- negative, hx abnormal: denies    SO/Partner: :   Zena Involved:  yes  Patient Ethnicity:    FOB Ethnicity: /      Occupation:         Pets:  2 dogs    Teratogen exposure since LMP: (OTC/prescription/ETOH/drugs): Actos, metfomrin- stopped when found out pregnant    Synthroid     History of prior GBS-infected child:  NA  Recent travel outside of country (partner also):  no    Known COVID/Zika exposure (partner also): no  Any history of genetic or chromosomal aberations in herself the father to be or their respective families: no  Any histories of spina bifida or abdominal wall defects; omphalocele's or gastroschisis no  Hx Hypothyroidism: Yes on Synthroid 88mcg  Hx preeclampsia or HTN:  denies  Hx PPD: n/a  Hx Diabetes: denies- has insulin resistance  Hx GDM: n/a  First degree relative with diabetes: dad and sister             No Known Allergies  Current Outpatient Medications   Medication Sig Dispense Refill    levothyroxine (SYNTHROID) 88 MCG tablet TAKE 1 TABLET BY MOUTH IN THE MORNING IMMEDIATELY UPON ARISING. DELAY EATING/DRINKING FOR 30 MINUTES. No current facility-administered medications for this visit.      Past Medical History:   Diagnosis Date    PCOS (polycystic ovarian syndrome)      Past Surgical History:   Procedure Laterality Date    TONSILLECTOMY  2013    WISDOM TOOTH EXTRACTION  12/2011         Swelling: no  Bleeding: no  Discharge: no   Dysuria: no  Nausea: yes -  encouraged small frequent meals, and vitamin B6 and unisom if needed. Heartburn: no  Epigastric pain: no       Office protocol reviewed, After hours number provided. Diet and exercise reveiwed  Warning signs reviewed  Testing reviewed     Q&A  Discussed in detail referral/orders for  for 1st trimester screen vs NIPSinfo provided for screening  Discussed with patient that if they proceed with the NIPs testing then they will be opting out of 1st trimester screening which can provide information in regards to placental health, congenital heart defects, metabolic abnormalities, and anatomic abnormalities. Patient is aware and has decided to: 1st trimester screen  Discussed dates and orders for Anatomy USd and fetal echo if indicated. Breastfeeding education. She verbally consented to HIV testing and drug screening. She was counseled on Toxoplasmosis/Listeriosis (cats/raw meat). Prenatal Vitamins recommended. Prenatal labs and dating us ordered. Early 1 hr GTT ordered      RV prn/ 4 weeks     Of the 30 minute duration appointment visit, [facility] spent at least 50% of the face-to-face time in counseling, explanation of diagnosis, planning of further management, and answering all questions.

## 2021-12-23 LAB
C. TRACHOMATIS DNA ,URINE: NEGATIVE
CULTURE: NORMAL
Lab: NORMAL
N. GONORRHOEAE DNA, URINE: NEGATIVE
SPECIMEN DESCRIPTION: NORMAL
SPECIMEN DESCRIPTION: NORMAL

## 2022-01-03 ENCOUNTER — PATIENT MESSAGE (OUTPATIENT)
Dept: OBGYN CLINIC | Age: 32
End: 2022-01-03

## 2022-01-03 RX ORDER — SENNA AND DOCUSATE SODIUM 50; 8.6 MG/1; MG/1
1 TABLET, FILM COATED ORAL DAILY
Qty: 30 TABLET | Refills: 1 | Status: SHIPPED
Start: 2022-01-03 | End: 2022-04-22

## 2022-01-03 NOTE — TELEPHONE ENCOUNTER
From: Franky Jones  To: Carmen Gomez  Sent: 1/3/2022 8:01 AM EST  Subject: Hemorrhoids     Good morning,    I noticed last night, I think Fidel developed hemorrhoids. I had some bleeding from my rectum yesterday and this morning when trying to use the bathroom. Fidel had hemorrhoids in the past and that is what this looks, and feels like. I have tried MiraLax but it doesnt seem to be helping much. Is there something else that may work better? Also, would it be safe for me to add in a fiber supplement to potentially help? Thank you!

## 2022-01-06 ENCOUNTER — HOSPITAL ENCOUNTER (OUTPATIENT)
Age: 32
Discharge: HOME | End: 2022-01-06
Payer: COMMERCIAL

## 2022-01-06 DIAGNOSIS — E03.9 HYPOTHYROIDISM AFFECTING PREGNANCY, ANTEPARTUM: ICD-10-CM

## 2022-01-06 DIAGNOSIS — Z3A.09 9 WEEKS GESTATION OF PREGNANCY: ICD-10-CM

## 2022-01-06 DIAGNOSIS — Z34.91 NORMAL PREGNANCY IN FIRST TRIMESTER: ICD-10-CM

## 2022-01-06 DIAGNOSIS — O99.280 HYPOTHYROIDISM AFFECTING PREGNANCY, ANTEPARTUM: ICD-10-CM

## 2022-01-06 LAB — TSH SERPL DL<=0.05 MIU/L-ACNC: 0.43 MIU/L (ref 0.3–5)

## 2022-01-06 PROCEDURE — 36415 COLL VENOUS BLD VENIPUNCTURE: CPT

## 2022-01-06 PROCEDURE — 86901 BLOOD TYPING SEROLOGIC RH(D): CPT

## 2022-01-06 PROCEDURE — 86850 RBC ANTIBODY SCREEN: CPT

## 2022-01-06 PROCEDURE — 84443 ASSAY THYROID STIM HORMONE: CPT

## 2022-01-06 PROCEDURE — 86900 BLOOD TYPING SEROLOGIC ABO: CPT

## 2022-01-07 LAB
ABO/RH: NORMAL
ANTIBODY SCREEN: NEGATIVE

## 2022-01-14 ENCOUNTER — ROUTINE PRENATAL (OUTPATIENT)
Dept: PERINATAL CARE | Age: 32
End: 2022-01-14
Payer: COMMERCIAL

## 2022-01-14 VITALS
HEART RATE: 104 BPM | BODY MASS INDEX: 45.32 KG/M2 | RESPIRATION RATE: 18 BRPM | DIASTOLIC BLOOD PRESSURE: 70 MMHG | TEMPERATURE: 97.6 F | SYSTOLIC BLOOD PRESSURE: 108 MMHG | WEIGHT: 282 LBS | HEIGHT: 66 IN

## 2022-01-14 DIAGNOSIS — O99.281 HYPOTHYROIDISM AFFECTING PREGNANCY IN FIRST TRIMESTER: ICD-10-CM

## 2022-01-14 DIAGNOSIS — O36.80X0 ENCOUNTER TO DETERMINE FETAL VIABILITY OF PREGNANCY, SINGLE OR UNSPECIFIED FETUS: ICD-10-CM

## 2022-01-14 DIAGNOSIS — Z3A.12 12 WEEKS GESTATION OF PREGNANCY: ICD-10-CM

## 2022-01-14 DIAGNOSIS — E03.9 HYPOTHYROIDISM AFFECTING PREGNANCY IN FIRST TRIMESTER: ICD-10-CM

## 2022-01-14 DIAGNOSIS — Z36.9 FIRST TRIMESTER SCREENING: Primary | ICD-10-CM

## 2022-01-14 DIAGNOSIS — O99.211 OBESITY AFFECTING PREGNANCY IN FIRST TRIMESTER: ICD-10-CM

## 2022-01-14 LAB
CRL: NORMAL
SAC DIAMETER: NORMAL

## 2022-01-14 PROCEDURE — 76813 OB US NUCHAL MEAS 1 GEST: CPT | Performed by: OBSTETRICS & GYNECOLOGY

## 2022-01-14 PROCEDURE — 76801 OB US < 14 WKS SINGLE FETUS: CPT | Performed by: OBSTETRICS & GYNECOLOGY

## 2022-01-14 NOTE — PROGRESS NOTES
Advise early 1-hour glucola (prescription given to patient previously by referring provider) to evaluate for pregestational diabetes. Advise serial thyroid function tests (TSH, free T4) every 4 weeks for duration of pregnancy and adjust synthroid dosing accordingly. I would advise initiation of daily oral baby aspirin 81 mg based on guidelines by the USPSTF/ACOG (for preeclampsia prevention for pregnant women at \"high-risk\"  for preeclampsia). Maternal-Fetal Medicine consultation declined by patient today for the medical/obstetrical complications of pregnancy. Maternal-Fetal Medicine (MFM) attending physician will defer all management for the medical/obstetrical complications of pregnancy to the primary attending obstetrical physician, as a result. Therefore, only an ultrasound evaluation was completed today in the MFM office.

## 2022-01-24 ENCOUNTER — HOSPITAL ENCOUNTER (OUTPATIENT)
Age: 32
Setting detail: SPECIMEN
Discharge: HOME | End: 2022-01-24

## 2022-01-24 LAB
GLUCOSE ADMINISTRATION: NORMAL
GLUCOSE TOLERANCE SCREEN 50G: 133 MG/DL (ref 70–135)

## 2022-01-26 ENCOUNTER — TELEPHONE (OUTPATIENT)
Dept: OBGYN CLINIC | Age: 32
End: 2022-01-26

## 2022-01-26 NOTE — TELEPHONE ENCOUNTER
She passed her early 1 hr GTT, Please note in care coordination.       Will need to repeat at 24-28 weeks

## 2022-01-28 ENCOUNTER — ROUTINE PRENATAL (OUTPATIENT)
Dept: OBGYN CLINIC | Age: 32
End: 2022-01-28

## 2022-01-28 VITALS — WEIGHT: 283.7 LBS | DIASTOLIC BLOOD PRESSURE: 82 MMHG | BODY MASS INDEX: 45.79 KG/M2 | SYSTOLIC BLOOD PRESSURE: 130 MMHG

## 2022-01-28 DIAGNOSIS — Z3A.14 14 WEEKS GESTATION OF PREGNANCY: ICD-10-CM

## 2022-01-28 DIAGNOSIS — Z34.92 NORMAL PREGNANCY IN SECOND TRIMESTER: ICD-10-CM

## 2022-01-28 DIAGNOSIS — O99.280 HYPOTHYROIDISM AFFECTING PREGNANCY, ANTEPARTUM: Primary | ICD-10-CM

## 2022-01-28 DIAGNOSIS — E03.9 HYPOTHYROIDISM AFFECTING PREGNANCY, ANTEPARTUM: Primary | ICD-10-CM

## 2022-01-28 PROCEDURE — 0502F SUBSEQUENT PRENATAL CARE: CPT | Performed by: OBSTETRICS & GYNECOLOGY

## 2022-01-28 RX ORDER — LEVOTHYROXINE SODIUM 88 UG/1
TABLET ORAL
Qty: 30 TABLET | Refills: 3 | Status: SHIPPED | OUTPATIENT
Start: 2022-01-28 | End: 2022-05-23

## 2022-01-28 NOTE — PROGRESS NOTES
Danuta Henson is a 29 y.o. female 14w4d        OB History    Para Term  AB Living   1 0 0 0 0 0   SAB IAB Ectopic Molar Multiple Live Births   0 0 0   0        # Outcome Date GA Lbr Osiel/2nd Weight Sex Delivery Anes PTL Lv   1 Current                      Vitals  BP: 130/82  Weight: 283 lb 11.2 oz (128.7 kg)  Patient Position: Sitting  Albumin: Negative  Glucose: Negative      The patient was seen and evaluated. No contractions or leakage of fluid. Signs and symptoms of  labor as well as labor were reviewed. The Nuchal Translucency testing was reviewed and found to be normal. A single marker MSAFP was ordered for a 15-20 week gestational age window. TOP ST OH Reviewed. Dates were reviewed with the patient. An 18-22 week anatomy ultrasound has been ordered. The patient will return to the office for her next visit in 4 weeks. See antepartum flow sheet. G1  Last Pap 2021  O+ Blood type  Hypothyroid- currently on 88 mcg  Hx of Migraines  Metformin-stopped  PNV  Imitrex but has not taken since she found out she was pregnant  Stopped taking her Actos once she found out she was pregnant  Refer 1st trimester  Early 1 hr GTT ordered- normal 133        Assessment:  1. Danuta Henson is a 29 y.o. female  2.   3. 14w4d    Patient Active Problem List    Diagnosis Date Noted    Anovulation 2020     See note from A Dr. Cyndi Hidalgo from 2020.  Elevated testosterone level in female 2018    Insulin resistance 2017    Anxiety 2017    Migraine 2017    PCOS (polycystic ovarian syndrome)     Gastroesophageal reflux disease 2011    Hematochezia 2011        Diagnosis Orders   1. Hypothyroidism affecting pregnancy, antepartum  TSH With Reflex Ft4   2. 14 weeks gestation of pregnancy     3.  Normal pregnancy in second trimester           Plan:  RTO 4 weeks  Recommend ASA 81mg  Discussed early 1hr gtt, will repeat 24-28 wks  Anatomy ultrasound scheduled w/ MFM          Patient was seen with total face to face time of 20 minutes. More than 50% of this visit was on counseling and education regarding her    Diagnosis Orders   1. Hypothyroidism affecting pregnancy, antepartum  TSH With Reflex Ft4   2. 14 weeks gestation of pregnancy     3. Normal pregnancy in second trimester      and her options. She was also counseled on her preventative health maintenance recommendations and follow-up.

## 2022-02-10 ENCOUNTER — HOSPITAL ENCOUNTER (OUTPATIENT)
Age: 32
Setting detail: SPECIMEN
Discharge: HOME | End: 2022-02-10

## 2022-02-10 DIAGNOSIS — E03.9 HYPOTHYROIDISM AFFECTING PREGNANCY, ANTEPARTUM: ICD-10-CM

## 2022-02-10 DIAGNOSIS — O99.280 HYPOTHYROIDISM AFFECTING PREGNANCY, ANTEPARTUM: ICD-10-CM

## 2022-02-10 LAB — TSH SERPL DL<=0.05 MIU/L-ACNC: 0.68 MIU/L (ref 0.3–5)

## 2022-02-24 ENCOUNTER — ROUTINE PRENATAL (OUTPATIENT)
Dept: OBGYN CLINIC | Age: 32
End: 2022-02-24

## 2022-02-24 VITALS — DIASTOLIC BLOOD PRESSURE: 60 MMHG | BODY MASS INDEX: 46.48 KG/M2 | SYSTOLIC BLOOD PRESSURE: 120 MMHG | WEIGHT: 288 LBS

## 2022-02-24 DIAGNOSIS — O99.280 HYPOTHYROIDISM AFFECTING PREGNANCY, ANTEPARTUM: ICD-10-CM

## 2022-02-24 DIAGNOSIS — Z3A.18 18 WEEKS GESTATION OF PREGNANCY: ICD-10-CM

## 2022-02-24 DIAGNOSIS — Z34.92 NORMAL PREGNANCY IN SECOND TRIMESTER: Primary | ICD-10-CM

## 2022-02-24 DIAGNOSIS — E03.9 HYPOTHYROIDISM AFFECTING PREGNANCY, ANTEPARTUM: ICD-10-CM

## 2022-02-24 PROCEDURE — 0502F SUBSEQUENT PRENATAL CARE: CPT | Performed by: OBSTETRICS & GYNECOLOGY

## 2022-02-28 NOTE — PROGRESS NOTES
Eloisa Aguilar is a 29 y.o. female 18w3d        OB History    Para Term  AB Living   1 0 0 0 0 0   SAB IAB Ectopic Molar Multiple Live Births   0 0 0   0        # Outcome Date GA Lbr Osiel/2nd Weight Sex Delivery Anes PTL Lv   1 Current                Vitals  BP: 120/60  Weight: 288 lb (130.6 kg)  Patient Position: Sitting  Albumin: Negative  Glucose: Negative  Fetal Heart Rate: 155    NO FM yet  No VB  NO LOF  NO CTX  No complaints or issues today       G1  Last Pap 2021  O+ Blood type  Hypothyroid- currently on 88 mcg (2/10/22: 0.68)  Hx of Migraines  Metformin-stopped  PNV  Imitrex but has not taken since she found out she was pregnant  Stopped taking her Actos once she found out she was pregnant  Refer 1st trimester- completed 22  Early 1 hr GTT ordered- normal 133  Anatomy scan sched 3/7/22  Wants to know gender :)        Assessment:  1. Eloisa Aguilar is a 29 y.o. female  2.   3. 18w3d    Patient Active Problem List    Diagnosis Date Noted    Anovulation 2020     See note from A Dr. Chay Merlos from 2020.  Elevated testosterone level in female 2018    Insulin resistance 2017    Anxiety 2017    Migraine 2017    PCOS (polycystic ovarian syndrome)     Gastroesophageal reflux disease 2011    Hematochezia 2011        Diagnosis Orders   1. Normal pregnancy in second trimester     2. 18 weeks gestation of pregnancy     3. Hypothyroidism affecting pregnancy, antepartum           Plan:  The patient will return to the office for her next visit in 4 weeks. See antepartum flow sheet.    Has anatomy US in a few weeks with [facility]

## 2022-03-04 PROBLEM — E03.9 HYPOTHYROID: Status: ACTIVE | Noted: 2022-03-04

## 2022-03-04 PROBLEM — R73.09 GTT (GLUCOSE TOLERANCE TEST) ABNORMAL: Status: ACTIVE | Noted: 2022-03-04

## 2022-03-07 ENCOUNTER — ROUTINE PRENATAL (OUTPATIENT)
Dept: PERINATAL CARE | Age: 32
End: 2022-03-07
Payer: COMMERCIAL

## 2022-03-07 VITALS
WEIGHT: 287 LBS | SYSTOLIC BLOOD PRESSURE: 122 MMHG | HEIGHT: 66 IN | HEART RATE: 92 BPM | DIASTOLIC BLOOD PRESSURE: 78 MMHG | RESPIRATION RATE: 16 BRPM | TEMPERATURE: 97.9 F | BODY MASS INDEX: 46.12 KG/M2

## 2022-03-07 DIAGNOSIS — O99.282 HYPOTHYROIDISM AFFECTING PREGNANCY IN SECOND TRIMESTER: ICD-10-CM

## 2022-03-07 DIAGNOSIS — O99.212 OBESITY AFFECTING PREGNANCY IN SECOND TRIMESTER: Primary | ICD-10-CM

## 2022-03-07 DIAGNOSIS — E03.9 HYPOTHYROIDISM AFFECTING PREGNANCY IN SECOND TRIMESTER: ICD-10-CM

## 2022-03-07 DIAGNOSIS — O99.810 ABNORMAL MATERNAL GLUCOSE TOLERANCE, ANTEPARTUM: ICD-10-CM

## 2022-03-07 DIAGNOSIS — Z36.86 ENCOUNTER FOR SCREENING FOR RISK OF PRE-TERM LABOR: ICD-10-CM

## 2022-03-07 DIAGNOSIS — Z3A.20 20 WEEKS GESTATION OF PREGNANCY: ICD-10-CM

## 2022-03-07 LAB
ABDOMINAL CIRCUMFERENCE: NORMAL
ABDOMINAL CIRCUMFERENCE: NORMAL
BIPARIETAL DIAMETER: NORMAL
BIPARIETAL DIAMETER: NORMAL
ESTIMATED FETAL WEIGHT: NORMAL
ESTIMATED FETAL WEIGHT: NORMAL
FEMORAL DIAMETER: NORMAL
FEMORAL DIAMETER: NORMAL
HC/AC: NORMAL
HC/AC: NORMAL
HEAD CIRCUMFERENCE: NORMAL
HEAD CIRCUMFERENCE: NORMAL

## 2022-03-07 PROCEDURE — 76811 OB US DETAILED SNGL FETUS: CPT | Performed by: OBSTETRICS & GYNECOLOGY

## 2022-03-07 PROCEDURE — 76817 TRANSVAGINAL US OBSTETRIC: CPT | Performed by: OBSTETRICS & GYNECOLOGY

## 2022-03-07 PROCEDURE — 99999 PR OFFICE/OUTPT VISIT,PROCEDURE ONLY: CPT | Performed by: OBSTETRICS & GYNECOLOGY

## 2022-03-07 RX ORDER — ASPIRIN 81 MG/1
81 TABLET, CHEWABLE ORAL DAILY
COMMUNITY
Start: 2022-01-28 | End: 2022-07-24

## 2022-03-07 NOTE — PROGRESS NOTES
Sent for MSAFP (maternal serum alpha-feto protein level) only lab draw today. Advise early 3-hour glucose tolerance test to evaluate for pregestational diabetes (if not already performed). Advise serial thyroid function tests (TSH, free T4) every 4 weeks for duration of pregnancy and adjust synthroid dosing accordingly. I would advise continuation of daily oral baby aspirin 81 mg based on guidelines by the USPSTF/ACOG (for preeclampsia prevention for pregnant women at \"high-risk\"  for preeclampsia). Patient declines Maternal-Fetal Medicine consultation at this time regarding the medical/obstetrical complications of pregnancy. Maternal-Fetal Medicine (MFM) attending physician will defer all management for the medical/obstetrical complications of pregnancy to the primary attending obstetrical physician, as a result. Therefore, only an ultrasound evaluation was completed today in the MFM office.

## 2022-03-24 ENCOUNTER — HOSPITAL ENCOUNTER (OUTPATIENT)
Age: 32
Setting detail: SPECIMEN
Discharge: HOME | End: 2022-03-24

## 2022-03-24 ENCOUNTER — ROUTINE PRENATAL (OUTPATIENT)
Dept: OBGYN CLINIC | Age: 32
End: 2022-03-24

## 2022-03-24 VITALS — BODY MASS INDEX: 46.97 KG/M2 | SYSTOLIC BLOOD PRESSURE: 120 MMHG | DIASTOLIC BLOOD PRESSURE: 72 MMHG | WEIGHT: 291 LBS

## 2022-03-24 DIAGNOSIS — Z3A.22 22 WEEKS GESTATION OF PREGNANCY: ICD-10-CM

## 2022-03-24 DIAGNOSIS — Z34.92 NORMAL PREGNANCY IN SECOND TRIMESTER: Primary | ICD-10-CM

## 2022-03-24 DIAGNOSIS — Z34.92 NORMAL PREGNANCY IN SECOND TRIMESTER: ICD-10-CM

## 2022-03-24 DIAGNOSIS — O99.280 HYPOTHYROIDISM AFFECTING PREGNANCY, ANTEPARTUM: ICD-10-CM

## 2022-03-24 DIAGNOSIS — E03.9 HYPOTHYROIDISM AFFECTING PREGNANCY, ANTEPARTUM: ICD-10-CM

## 2022-03-24 LAB
ABSOLUTE EOS #: 0.04 K/UL (ref 0–0.44)
ABSOLUTE IMMATURE GRANULOCYTE: 0.03 K/UL (ref 0–0.3)
ABSOLUTE LYMPH #: 2.26 K/UL (ref 1.1–3.7)
ABSOLUTE MONO #: 0.69 K/UL (ref 0.1–1.2)
BASOPHILS # BLD: 0 % (ref 0–2)
BASOPHILS ABSOLUTE: 0.04 K/UL (ref 0–0.2)
EOSINOPHILS RELATIVE PERCENT: 0 % (ref 1–4)
HCT VFR BLD CALC: 37.6 % (ref 36.3–47.1)
HEMOGLOBIN: 12.2 G/DL (ref 11.9–15.1)
IMMATURE GRANULOCYTES: 0 %
LYMPHOCYTES # BLD: 21 % (ref 24–43)
MCH RBC QN AUTO: 29.5 PG (ref 25.2–33.5)
MCHC RBC AUTO-ENTMCNC: 32.4 G/DL (ref 28.4–34.8)
MCV RBC AUTO: 91 FL (ref 82.6–102.9)
MONOCYTES # BLD: 6 % (ref 3–12)
NRBC AUTOMATED: 0 PER 100 WBC
PDW BLD-RTO: 13.1 % (ref 11.8–14.4)
PLATELET # BLD: 398 K/UL (ref 138–453)
PMV BLD AUTO: 9.9 FL (ref 8.1–13.5)
RBC # BLD: 4.13 M/UL (ref 3.95–5.11)
SEG NEUTROPHILS: 73 % (ref 36–65)
SEGMENTED NEUTROPHILS ABSOLUTE COUNT: 7.85 K/UL (ref 1.5–8.1)
TSH SERPL DL<=0.05 MIU/L-ACNC: 0.77 MIU/L (ref 0.3–5)
WBC # BLD: 10.9 K/UL (ref 3.5–11.3)

## 2022-03-24 PROCEDURE — 0502F SUBSEQUENT PRENATAL CARE: CPT | Performed by: NURSE PRACTITIONER

## 2022-03-24 NOTE — PROGRESS NOTES
Presents for OB visit  Gestation 22w3d  Estimated Date of Delivery: 22    G1  Last Pap 2021  O+ Blood type  Hypothyroid- currently on 88 mcg (2/10/22: 0.68)  Hx of Migraines  Metformin-stopped  PNV  Imitrex but has not taken since she found out she was pregnant  Stopped taking her Actos once she found out she was pregnant  Refer 1st trimester- completed 22  Early 1 hr GTT ordered- normal 80  Knows gender- girl                OB History    Para Term  AB Living   1 0 0 0 0 0   SAB IAB Ectopic Molar Multiple Live Births   0 0 0   0        # Outcome Date GA Lbr Osiel/2nd Weight Sex Delivery Anes PTL Lv   1 Current                     No Known Allergies  Current Outpatient Medications   Medication Sig Dispense Refill    Prenatal Vit-Fe Fumarate-FA (PRENATAL VITAMINS PO) Take 1 tablet by mouth daily      aspirin 81 MG chewable tablet Take 81 mg by mouth daily      levothyroxine (SYNTHROID) 88 MCG tablet TAKE 1 TABLET BY MOUTH IN THE MORNING IMMEDIATELY UPON ARISING. DELAY EATING/DRINKING FOR 30 MINUTES. 30 tablet 3    sennosides-docusate sodium (SENOKOT-S) 8.6-50 MG tablet Take 1 tablet by mouth daily (Patient not taking: Reported on 2022) 30 tablet 1     No current facility-administered medications for this visit. Past Medical History:   Diagnosis Date    PCOS (polycystic ovarian syndrome)        Past Surgical History:   Procedure Laterality Date    TONSILLECTOMY  2013    WISDOM TOOTH EXTRACTION  2011     Headaches: caffiene or tylenol help no severe persistent. Swelling: no  Bleeding: no  Discharge: no   Dysuria: no  Nausea: no  Heartburn: yes - tums have been helping  Epigastric pain: no  Contractions: no  Leakage of fluid: no  + fetal movement       Return 4 WEEKS    Labs as indicated CBC,1 hr GTT, UA between 24-28 weeks  Antibody screen: n/a    PTL signs reviewed, if indicated  Kick Count Instructions given if indicated:   The patient was instructed on fetal kick counts and was given a kick sheet to complete every 8 hours. This is to begin at 28 weeks gestation. She was instructed that the baby should move at a minimum of ten times within one hour after a meal. The patient was instructed to lay down on her left side twenty minutes after eating and count movements for up to one hour with a target value of ten movements. She was instructed to notify the office if she did not make that target after two attempts or if after any attempt there was less than four movements. After hour numbers reviewed , along with labor signs if indicated. Contractions/cramping between 5-7 minutes and persisting even with attempts of increased water and laying on side. Fluid leakage, bleeding  NST information given no  The patient was counseled on Labor & Delivery. Route of delivery and counseling on vaginal, operative vaginal, and  sections were completed with the risks of each to both the patient as well as her baby. The possibility of a blood transfusion was discussed as well. The patient was not opposed to receiving a transfusion if needed. The patient was counseled on types of analgesia during labor and is considering either Regional or IV medication the risks, benefits and alternatives were discussed. The patient was counseled on the mandatory call ahead policy. She has been instructed to call the office at anytime prior to going into the hospital so the on-call physician may direct her to the appropriate facility for care. Exceptions were reviewed including but not limited to: Decreased fetal movement, vaginal Bleeding or hemorrhage, trauma, readily expectant delivery, or any instance where she feels 911 should be utilized. Of the 20 minute duration appointment visit, Arleth Nix CNP spent at least 50% of the face-to-face time in counseling, explanation of diagnosis, planning of further management, and answering all questions.

## 2022-03-24 NOTE — PATIENT INSTRUCTIONS
Patient Education        Weeks 22 to 26 of Your Pregnancy: Care Instructions  Overview     As you enter your 7th month of pregnancy at week 26, your baby's lungs are growing stronger and getting ready to breathe. You may notice that your baby responds to the sound of your voice. You may also notice that your baby does less turning and twisting and more squirming, kicking, or jerking. Jerking often means that your baby has hiccups. Hiccups are normal and are only temporary. You may want to think about attending a childbirth preparation class. This is also a good time to start thinking about whether you want to have pain medicine during labor. You may be tested for gestational diabetes between weeks 25 and 28. Gestational diabetes occurs when your blood sugar level gets too high when you're pregnant. The test is important, because you can have gestational diabetes and not know it. But the condition can cause problems for your baby. Follow-up care is a key part of your treatment and safety. Be sure to make and go to all appointments, and call your doctor if you are having problems. It's also a good idea to know your test results and keep a list of the medicines you take. How can you care for yourself at home? Ease discomfort from your baby's kicking  · Change your position. Sometimes this will cause your baby to change position too. · Take a deep breath while you raise your arm over your head. Then breathe out while you drop your arm. Do Kegel exercises to prevent urine from leaking  · You can do Kegel exercises while you stand or sit. ? Squeeze the same muscles you would use to stop your urine. Your belly and thighs should not move. ? Hold the squeeze for 3 seconds, and then relax for 3 seconds. ? Start with 3 seconds. Then add 1 second each week until you are able to squeeze for 10 seconds. ? Repeat the exercise 10 to 15 times for each session. Do three or more sessions each day.   Ease or reduce swelling in your feet, ankles, hands, and fingers  · If your fingers are puffy, take off your rings. · Do not eat high-salt foods, such as potato chips. · Prop up your feet on a stool or couch as much as possible. Sleep with pillows under your feet. · Do not stand for long periods of time or wear tight shoes. · Wear support stockings. Where can you learn more? Go to [email or web address] [email or web address] org and sign in to your [employer] account. Enter G264 in the Zeen[employer] box to learn more about \"Weeks 22 to 26 of Your Pregnancy: Care Instructions. \"     If you do not have an account, please click on the \"Sign Up Now\" link. Current as of: June 16, 2021               Content Version: 13.1  © 1732-9513 Healthwise, Incorporated. Care instructions adapted under license by [facility] ([facility]). If you have questions about a medical condition or this instruction, always ask your healthcare professional. [place] [address] any warranty or liability for your use of this information.

## 2022-04-18 ENCOUNTER — ROUTINE PRENATAL (OUTPATIENT)
Dept: PERINATAL CARE | Age: 32
End: 2022-04-18
Payer: COMMERCIAL

## 2022-04-18 VITALS
SYSTOLIC BLOOD PRESSURE: 128 MMHG | DIASTOLIC BLOOD PRESSURE: 76 MMHG | HEIGHT: 66 IN | TEMPERATURE: 97.2 F | RESPIRATION RATE: 16 BRPM | BODY MASS INDEX: 47.09 KG/M2 | HEART RATE: 88 BPM | WEIGHT: 293 LBS

## 2022-04-18 DIAGNOSIS — Z36.4 ANTENATAL SCREENING FOR FETAL GROWTH RETARDATION USING ULTRASONICS: ICD-10-CM

## 2022-04-18 DIAGNOSIS — Z3A.26 26 WEEKS GESTATION OF PREGNANCY: ICD-10-CM

## 2022-04-18 DIAGNOSIS — O99.810 ABNORMAL MATERNAL GLUCOSE TOLERANCE, ANTEPARTUM: ICD-10-CM

## 2022-04-18 DIAGNOSIS — E03.9 HYPOTHYROIDISM AFFECTING PREGNANCY IN SECOND TRIMESTER: Primary | ICD-10-CM

## 2022-04-18 DIAGNOSIS — O99.212 OBESITY AFFECTING PREGNANCY IN SECOND TRIMESTER: ICD-10-CM

## 2022-04-18 DIAGNOSIS — O99.282 HYPOTHYROIDISM AFFECTING PREGNANCY IN SECOND TRIMESTER: Primary | ICD-10-CM

## 2022-04-18 LAB
ABDOMINAL CIRCUMFERENCE: NORMAL
BIPARIETAL DIAMETER: NORMAL
ESTIMATED FETAL WEIGHT: NORMAL
FEMORAL DIAMETER: NORMAL
HC/AC: NORMAL
HEAD CIRCUMFERENCE: NORMAL

## 2022-04-18 PROCEDURE — 76816 OB US FOLLOW-UP PER FETUS: CPT | Performed by: OBSTETRICS & GYNECOLOGY

## 2022-04-18 PROCEDURE — 99999 PR OFFICE/OUTPT VISIT,PROCEDURE ONLY: CPT | Performed by: OBSTETRICS & GYNECOLOGY

## 2022-04-21 PROBLEM — O09.90 HIGH-RISK PREGNANCY: Status: ACTIVE | Noted: 2022-04-21

## 2022-04-21 PROBLEM — G43.909 MIGRAINE: Status: RESOLVED | Noted: 2017-05-02 | Resolved: 2022-04-21

## 2022-04-21 PROBLEM — O99.280 HYPOTHYROID IN PREGNANCY, ANTEPARTUM: Status: ACTIVE | Noted: 2022-04-21

## 2022-04-21 PROBLEM — O99.210 OBESITY IN PREGNANCY: Status: ACTIVE | Noted: 2022-04-21

## 2022-04-21 PROBLEM — E03.9 HYPOTHYROID IN PREGNANCY, ANTEPARTUM: Status: ACTIVE | Noted: 2022-04-21

## 2022-04-21 NOTE — PROGRESS NOTES
gain: 10 lb (4.536 kg)    Jamal Medina has not received the flu vaccine as appropriate. Jamal Medina has not received the Tdap vaccine as appropriate  Jamal Medina has received the COVID vaccine as appropriate    ASSESSMENT/PLAN:  IUP 26w3d weeks       Patient Active Problem List    Diagnosis Date Noted    Need for diphtheria-tetanus-pertussis (Tdap) vaccine 2022     Priority: Medium    COVID-19 vaccine administered 2022     Priority: Medium    Hypothyroid in pregnancy, antepartum 2022     Priority: Medium     Overview Note:     needs TSH q 4 weeks; o Synthroid 88 mg  TSH 0.43 on 22  TSH 0.68 2/10/22   4/22/22 TSH               T4          High-risk pregnancy 2022     Priority: Medium     Overview Note:     Imitrex but has not taken since she found out she was pregnant  Stopped taking her Actos once she found out she was pregnant      Obesity in pregnancy 2022     Priority: Medium     Overview Note:     Pre pregnant BMI- 45.67  Growth US  Asa @13 weeks  Early GTT- 133      Hypothyroid 2022    Elv Early 1 hr GTT, Needs Early 3 hr ordered 2022     Overview Note:     Early 1 hr GTT: 133  Early 3 hr GTT: **      Anxiety 2017     Overview Note:     No meds      PCOS      Overview Note:     Was previously on metformin      GERD 2011      Orders Placed This Encounter   Procedures    TSH With Reflex Ft4     Standing Status:   Future     Standing Expiration Date:   2023    CBC with Auto Differential     Standing Status:   Future     Standing Expiration Date:   2023   GTT already ordered    Education  22 growth US scheduled @ [facility]  Discussed options for delivery location, call group discussed. Pt encouraged to Tour facilities.    Pt has called [facility] for pediatrician      The following were addressed during this visit:    16-18 Weeks  - Ultrasound   - GTT/GC1     22 Weeks  -  Labor Signs     28 Weeks  - Fetal Growth and Movement Baby Friendly  - Benefits of Rooming In           The problem list was reviewed and updated with any new issues from today's visit    [facility] will monitor fetal movement daily. Signs and symptoms of  labor and pre-Eclampsia reviewed    28 week lab panel was discussed and was ordered. RhoGam was discussed and was not ordered. Initial discussion on Tdap in pregnancy was discussed and pt is agreeable, plan to give at follow up appt. She was counseled regarding all of the above:    Return in about 3 weeks (around 2022) for Return OB. [facility] a total time spent of 15 minutes for the visit on this date of service by the [facility]  Both face-to-face (counseling and education) and non face-to-face time (care coordination), were spent in determining the total time component.      Electronically Signed By: DAVIAN Juarez CNM

## 2022-04-22 ENCOUNTER — HOSPITAL ENCOUNTER (OUTPATIENT)
Age: 32
Setting detail: SPECIMEN
Discharge: HOME | End: 2022-04-22

## 2022-04-22 ENCOUNTER — ROUTINE PRENATAL (OUTPATIENT)
Dept: OBGYN CLINIC | Age: 32
End: 2022-04-22

## 2022-04-22 VITALS — WEIGHT: 293 LBS | SYSTOLIC BLOOD PRESSURE: 120 MMHG | BODY MASS INDEX: 47.45 KG/M2 | DIASTOLIC BLOOD PRESSURE: 60 MMHG

## 2022-04-22 DIAGNOSIS — Z3A.26 26 WEEKS GESTATION OF PREGNANCY: ICD-10-CM

## 2022-04-22 DIAGNOSIS — Z23 COVID-19 VACCINE ADMINISTERED: ICD-10-CM

## 2022-04-22 DIAGNOSIS — O99.210 OBESITY IN PREGNANCY: ICD-10-CM

## 2022-04-22 DIAGNOSIS — O99.280 HYPOTHYROID IN PREGNANCY, ANTEPARTUM: ICD-10-CM

## 2022-04-22 DIAGNOSIS — E03.9 HYPOTHYROID IN PREGNANCY, ANTEPARTUM: ICD-10-CM

## 2022-04-22 DIAGNOSIS — Z34.92 NORMAL PREGNANCY IN SECOND TRIMESTER: ICD-10-CM

## 2022-04-22 DIAGNOSIS — K21.9 GASTROESOPHAGEAL REFLUX DISEASE WITHOUT ESOPHAGITIS: ICD-10-CM

## 2022-04-22 DIAGNOSIS — O09.90 HIGH RISK PREGNANCY, ANTEPARTUM: ICD-10-CM

## 2022-04-22 DIAGNOSIS — E03.9 HYPOTHYROIDISM, UNSPECIFIED TYPE: ICD-10-CM

## 2022-04-22 DIAGNOSIS — Z23 NEED FOR DIPHTHERIA-TETANUS-PERTUSSIS (TDAP) VACCINE: ICD-10-CM

## 2022-04-22 DIAGNOSIS — Z3A.26 26 WEEKS GESTATION OF PREGNANCY: Primary | ICD-10-CM

## 2022-04-22 DIAGNOSIS — Z3A.22 22 WEEKS GESTATION OF PREGNANCY: ICD-10-CM

## 2022-04-22 DIAGNOSIS — F41.9 ANXIETY: ICD-10-CM

## 2022-04-22 LAB
ABSOLUTE EOS #: 0.09 K/UL (ref 0–0.44)
ABSOLUTE IMMATURE GRANULOCYTE: 0.04 K/UL (ref 0–0.3)
ABSOLUTE LYMPH #: 1.76 K/UL (ref 1.1–3.7)
ABSOLUTE MONO #: 0.5 K/UL (ref 0.1–1.2)
BASOPHILS # BLD: 0 % (ref 0–2)
BASOPHILS ABSOLUTE: <0.03 K/UL (ref 0–0.2)
EOSINOPHILS RELATIVE PERCENT: 1 % (ref 1–4)
GLUCOSE ADMINISTRATION: ABNORMAL
GLUCOSE TOLERANCE SCREEN 50G: 142 MG/DL (ref 70–135)
HCT VFR BLD CALC: 36.8 % (ref 36.3–47.1)
HEMOGLOBIN: 11.7 G/DL (ref 11.9–15.1)
IMMATURE GRANULOCYTES: 0 %
LYMPHOCYTES # BLD: 18 % (ref 24–43)
MCH RBC QN AUTO: 28.8 PG (ref 25.2–33.5)
MCHC RBC AUTO-ENTMCNC: 31.8 G/DL (ref 28.4–34.8)
MCV RBC AUTO: 90.6 FL (ref 82.6–102.9)
MONOCYTES # BLD: 5 % (ref 3–12)
NRBC AUTOMATED: 0 PER 100 WBC
PDW BLD-RTO: 12.9 % (ref 11.8–14.4)
PLATELET # BLD: 407 K/UL (ref 138–453)
PMV BLD AUTO: 9.5 FL (ref 8.1–13.5)
RBC # BLD: 4.06 M/UL (ref 3.95–5.11)
SEG NEUTROPHILS: 76 % (ref 36–65)
SEGMENTED NEUTROPHILS ABSOLUTE COUNT: 7.25 K/UL (ref 1.5–8.1)
TSH SERPL DL<=0.05 MIU/L-ACNC: 0.63 UIU/ML (ref 0.3–5)
WBC # BLD: 9.7 K/UL (ref 3.5–11.3)

## 2022-04-22 PROCEDURE — 0502F SUBSEQUENT PRENATAL CARE: CPT | Performed by: ADVANCED PRACTICE MIDWIFE

## 2022-04-23 DIAGNOSIS — Z3A.22 22 WEEKS GESTATION OF PREGNANCY: ICD-10-CM

## 2022-04-23 DIAGNOSIS — Z34.92 NORMAL PREGNANCY IN SECOND TRIMESTER: ICD-10-CM

## 2022-04-23 LAB
-: ABNORMAL
BACTERIA: ABNORMAL
BILIRUBIN URINE: NEGATIVE
COLOR: YELLOW
EPITHELIAL CELLS UA: ABNORMAL /HPF (ref 0–5)
GLUCOSE URINE: NEGATIVE
KETONES, URINE: NEGATIVE
LEUKOCYTE ESTERASE, URINE: NEGATIVE
NITRITE, URINE: NEGATIVE
PH UA: 7.5 (ref 5–8)
PROTEIN UA: NEGATIVE
RBC UA: ABNORMAL /HPF (ref 0–2)
SPECIFIC GRAVITY UA: 1.02 (ref 1–1.03)
TURBIDITY: ABNORMAL
URINE HGB: NEGATIVE
UROBILINOGEN, URINE: NORMAL
WBC UA: ABNORMAL /HPF (ref 0–5)

## 2022-04-24 LAB
CULTURE: ABNORMAL
SPECIMEN DESCRIPTION: ABNORMAL

## 2022-04-25 ENCOUNTER — TELEPHONE (OUTPATIENT)
Dept: OBGYN CLINIC | Age: 32
End: 2022-04-25

## 2022-04-25 DIAGNOSIS — O99.810 ABNORMAL GLUCOSE AFFECTING PREGNANCY: Primary | ICD-10-CM

## 2022-04-25 RX ORDER — AMOXICILLIN 500 MG/1
500 CAPSULE ORAL 3 TIMES DAILY
Qty: 21 CAPSULE | Refills: 0 | Status: SHIPPED | OUTPATIENT
Start: 2022-04-25 | End: 2022-05-02

## 2022-04-25 NOTE — TELEPHONE ENCOUNTER
Pt called in with her results- informed 1 hour it elevated needs 3 hour gtt will complete next week- pt has + GBS in urine not sure if she needs to be treated or not.

## 2022-05-05 ENCOUNTER — HOSPITAL ENCOUNTER (OUTPATIENT)
Age: 32
Setting detail: SPECIMEN
Discharge: HOME | End: 2022-05-05

## 2022-05-05 DIAGNOSIS — O99.810 ABNORMAL GLUCOSE AFFECTING PREGNANCY: ICD-10-CM

## 2022-05-05 LAB
3 HR GLUCOSE: 138 MG/DL (ref 65–139)
AMOUNT GLUCOSE GIVEN: 100 G
GLUCOSE FASTING: 79 MG/DL (ref 65–94)
GLUCOSE TOLERANCE TEST 1 HOUR: 172 MG/DL (ref 65–179)
GLUCOSE TOLERANCE TEST 2 HOUR: 114 MG/DL (ref 65–154)

## 2022-05-12 ENCOUNTER — ROUTINE PRENATAL (OUTPATIENT)
Dept: OBGYN CLINIC | Age: 32
End: 2022-05-12
Payer: COMMERCIAL

## 2022-05-12 VITALS — BODY MASS INDEX: 47.63 KG/M2 | SYSTOLIC BLOOD PRESSURE: 118 MMHG | WEIGHT: 293 LBS | DIASTOLIC BLOOD PRESSURE: 68 MMHG

## 2022-05-12 DIAGNOSIS — Z3A.29 29 WEEKS GESTATION OF PREGNANCY: ICD-10-CM

## 2022-05-12 DIAGNOSIS — Z34.93 NORMAL PREGNANCY IN THIRD TRIMESTER: Primary | ICD-10-CM

## 2022-05-12 DIAGNOSIS — Z23 NEED FOR TDAP VACCINATION: ICD-10-CM

## 2022-05-12 PROCEDURE — 90715 TDAP VACCINE 7 YRS/> IM: CPT | Performed by: NURSE PRACTITIONER

## 2022-05-12 PROCEDURE — 0502F SUBSEQUENT PRENATAL CARE: CPT | Performed by: NURSE PRACTITIONER

## 2022-05-12 PROCEDURE — 90471 IMMUNIZATION ADMIN: CPT | Performed by: NURSE PRACTITIONER

## 2022-05-12 NOTE — PROGRESS NOTES
The patient requested to have have the Tdap vaccine. Consent obtained. Injection of 0.5mL given Left deltoid Intramuscular. Lot #: [id number]  EXP: 4/1/24  NDC: 09658-426-62  Patient tolerated well.

## 2022-05-12 NOTE — PROGRESS NOTES
Presents for OB visit  Gestation 29w3d  Estimated Date of Delivery: 22    Last Pap 2021      Estimated Date of Delivery: 22   CAPO By LMP/ TVUS:  Ultrasound Date  12/15/21      [facility]   Gender: girl  Partner:    PRENATAL LAB RESULTS:      Pre-pregnancy: BMI  kg/m²    Blood Type/Rh: O+  Antibody Screen: negative  Initial Hemoglobin, Hematocrit, Platelets: 78.4/ 59.3/ 398  Rubella: immune  T. Pallidum, IgG: nonreactive  Hepatitis B Surface Antigen: nonreactive  Hepatitis C Antibody: nonreactive  HIV: nonreactive  Sickle Cell Screen:   Gonorrhea: neg  Chlamydia: neg  Urine culture:  negative date: 21  Initial urine drug screen:  negative  date: 21  Cystic Fibrosis Screen:   First Trimester Screen:  22 negative  MSAFP/Multiple Markers: not done  Non-Invasive Prenatal Testing: not done      Early 1 hour Glucose Tolerance Test: 133  Early 3 hour Glucose Tolerance Test:   1 hour Glucose Tolerance Test: 142 Abnormal  3 hour Glucose Tolerance Test:     Anatomy US: Placenta-  anterior  Vessel cord # -  3  Cord insertion: normal  Cervical length: 4.61cm  Anatomy: wnl    Group B Strep:  +GBS Urine Date: 2022             Tdap:  Flu shot:   COVID Shot:   Breast pump RX:  Medicaid/ WIC referral:                    OB History    Para Term  AB Living   1 0 0 0 0 0   SAB IAB Ectopic Molar Multiple Live Births   0 0 0   0        # Outcome Date GA Lbr Osiel/2nd Weight Sex Delivery Anes PTL Lv   1 Current                     No Known Allergies  Current Outpatient Medications   Medication Sig Dispense Refill    Prenatal Vit-Fe Fumarate-FA (PRENATAL VITAMINS PO) Take 1 tablet by mouth daily      aspirin 81 MG chewable tablet Take 81 mg by mouth daily      levothyroxine (SYNTHROID) 88 MCG tablet TAKE 1 TABLET BY MOUTH IN THE MORNING IMMEDIATELY UPON ARISING. DELAY EATING/DRINKING FOR 30 MINUTES. 30 tablet 3     No current facility-administered medications for this visit. Past Medical History:   Diagnosis Date    Migraines 2017    Previously on Imitrex but stopped once she found out she was pregnanct    PCOS (polycystic ovarian syndrome)        Past Surgical History:   Procedure Laterality Date    TONSILLECTOMY  2013    WISDOM TOOTH EXTRACTION  2011     Headaches: no  Swelling: no  Bleeding: no  Discharge: no   Dysuria: no  Nausea: no  Heartburn: no  Epigastric pain: no  Contractions: no  Leakage of fluid: no  + fetal movement       Return 2 WEEKS    Labs as indicated n/a    PTL signs reviewed, if indicated  Kick Count Instructions given if indicated: The patient was instructed on fetal kick counts and was given a kick sheet to complete every 8 hours. This is to begin at 28 weeks gestation. She was instructed that the baby should move at a minimum of ten times within one hour after a meal. The patient was instructed to lay down on her left side twenty minutes after eating and count movements for up to one hour with a target value of ten movements. She was instructed to notify the office if she did not make that target after two attempts or if after any attempt there was less than four movements. After hour numbers reviewed , along with labor signs if indicated. Contractions/cramping between 5-7 minutes and persisting even with attempts of increased water and laying on side. Fluid leakage, bleeding    The patient was counseled on Labor & Delivery. Route of delivery and counseling on vaginal, operative vaginal, and  sections were completed with the risks of each to both the patient as well as her baby. The possibility of a blood transfusion was discussed as well. The patient was not opposed to receiving a transfusion if needed. The patient was counseled on types of analgesia during labor and is considering either Regional or IV medication the risks, benefits and alternatives were discussed.    The patient was counseled on the mandatory call ahead policy. She has been instructed to call the office at anytime prior to going into the hospital so the on-call physician may direct her to the appropriate facility for care. Exceptions were reviewed including but not limited to: Decreased fetal movement, vaginal Bleeding or hemorrhage, trauma, readily expectant delivery, or any instance where she feels 911 should be utilized. Of the 20 minute duration appointment visit, Elvira Mcgee CNP spent at least 50% of the face-to-face time in counseling, explanation of diagnosis, planning of further management, and answering all questions.

## 2022-05-12 NOTE — PATIENT INSTRUCTIONS

## 2022-05-23 RX ORDER — LEVOTHYROXINE SODIUM 88 UG/1
TABLET ORAL
Qty: 30 TABLET | Refills: 0 | Status: SHIPPED | OUTPATIENT
Start: 2022-05-23 | End: 2022-06-29

## 2022-05-26 ENCOUNTER — ROUTINE PRENATAL (OUTPATIENT)
Dept: OBGYN CLINIC | Age: 32
End: 2022-05-26

## 2022-05-26 VITALS — WEIGHT: 293 LBS | DIASTOLIC BLOOD PRESSURE: 70 MMHG | BODY MASS INDEX: 47.73 KG/M2 | SYSTOLIC BLOOD PRESSURE: 114 MMHG

## 2022-05-26 DIAGNOSIS — Z34.93 PRENATAL CARE IN THIRD TRIMESTER: Primary | ICD-10-CM

## 2022-05-26 PROCEDURE — 0502F SUBSEQUENT PRENATAL CARE: CPT | Performed by: OBSTETRICS & GYNECOLOGY

## 2022-05-26 NOTE — PROGRESS NOTES
Jeanne Chun is a 29 y.o. female 31w3d        OB History    Para Term  AB Living   1 0 0 0 0 0   SAB IAB Ectopic Molar Multiple Live Births   0 0 0   0        # Outcome Date GA Lbr Osiel/2nd Weight Sex Delivery Anes PTL Lv   1 Current                Vitals  BP: 114/70  Weight: 295 lb 11.2 oz (134.1 kg)  Patient Position: Sitting  Albumin: Negative  Glucose: Negative      + FM  NO VB  NO LOF  NO CTX  Has growth US at [facility] net week  No complaints or issues today           Last Pap 2021      Estimated Date of Delivery: 22   CAPO By LMP/ TVUS:  Ultrasound Date  12/15/21      Delivery at : [facility]  Gender: girl  Partner:    PRENATAL LAB RESULTS:      Pre-pregnancy: BMI  kg/m²    Blood Type/Rh: O+  Antibody Screen: negative  Initial Hemoglobin, Hematocrit, Platelets: 87.6/ 76.7/ 398  Rubella: immune  T. Pallidum, IgG: nonreactive  Hepatitis B Surface Antigen: nonreactive  Hepatitis C Antibody: nonreactive  HIV: nonreactive  Sickle Cell Screen:   Gonorrhea: neg  Chlamydia: neg  Urine culture:  negative date: 21  Initial urine drug screen:  negative  date: 21  Cystic Fibrosis Screen:   First Trimester Screen:  22 negative  MSAFP/Multiple Markers: not done  Non-Invasive Prenatal Testing: not done      Early 1 hour Glucose Tolerance Test: 133  Early 3 hour Glucose Tolerance Test:   1 hour Glucose Tolerance Test: 142 Abnormal  3 hour Glucose Tolerance Test:     Anatomy US: Placenta-  anterior  Vessel cord # -  3  Cord insertion: normal  Cervical length: 4.61cm  Anatomy: wnl    Group B Strep:  +GBS Urine Date: 2022             Tdap: 22 Tdap given  Flu shot:   COVID Shot:   Breast pump RX:  Medicaid/ WIC referral:           Assessment:  Rizwan Chun is a 29 y.o. female  2.    3. 31w3d    Patient Active Problem List    Diagnosis Date Noted    Need for diphtheria-tetanus-pertussis (Tdap) vaccine 2022     Priority: Medium    COVID-19 vaccine administered 04/22/2022     Priority: Medium    Hypothyroid in pregnancy, antepartum 04/21/2022     Priority: Medium     needs TSH q 4 weeks; o Synthroid 88 mg  TSH 0.43 on 1/6/22  TSH 0.68 2/10/22   4/22/22 TSH      0.63         T4          High-risk pregnancy 04/21/2022     Imitrex but has not taken since she found out she was pregnant  Stopped taking her Actos once she found out she was pregnant      Obesity in pregnancy 04/21/2022     Pre pregnant BMI- 45.67  Growth US  Asa @13 weeks  Early GTT- 133      Hypothyroid 03/04/2022    Elv Early 1 hr GTT, Needs Early 3 hr ordered 03/04/2022     Early 1 hr GTT: 133  Early 3 hr GTT: **      Anxiety 05/02/2017     No meds      PCOS      Was previously on metformin      GERD 07/14/2011             Plan:  The patient will return to the office for her next visit in 2 weeks. See antepartum flow sheet.    Discussed GBS in urine and tx during labor

## 2022-06-01 ENCOUNTER — ROUTINE PRENATAL (OUTPATIENT)
Dept: PERINATAL CARE | Age: 32
End: 2022-06-01
Payer: COMMERCIAL

## 2022-06-01 VITALS
RESPIRATION RATE: 16 BRPM | DIASTOLIC BLOOD PRESSURE: 81 MMHG | SYSTOLIC BLOOD PRESSURE: 123 MMHG | TEMPERATURE: 97.2 F | HEIGHT: 66 IN | HEART RATE: 114 BPM | WEIGHT: 293 LBS | BODY MASS INDEX: 47.09 KG/M2

## 2022-06-01 DIAGNOSIS — Z36.4 ANTENATAL SCREENING FOR FETAL GROWTH RETARDATION USING ULTRASONICS: ICD-10-CM

## 2022-06-01 DIAGNOSIS — O99.213 OBESITY AFFECTING PREGNANCY IN THIRD TRIMESTER: ICD-10-CM

## 2022-06-01 DIAGNOSIS — E03.9 HYPOTHYROIDISM AFFECTING PREGNANCY IN THIRD TRIMESTER: Primary | ICD-10-CM

## 2022-06-01 DIAGNOSIS — O99.810 ABNORMAL MATERNAL GLUCOSE TOLERANCE, ANTEPARTUM: ICD-10-CM

## 2022-06-01 DIAGNOSIS — Z3A.32 32 WEEKS GESTATION OF PREGNANCY: ICD-10-CM

## 2022-06-01 DIAGNOSIS — Z13.89 ENCOUNTER FOR ROUTINE SCREENING FOR MALFORMATION USING ULTRASONICS: ICD-10-CM

## 2022-06-01 DIAGNOSIS — O99.283 HYPOTHYROIDISM AFFECTING PREGNANCY IN THIRD TRIMESTER: Primary | ICD-10-CM

## 2022-06-01 PROCEDURE — 76819 FETAL BIOPHYS PROFIL W/O NST: CPT | Performed by: OBSTETRICS & GYNECOLOGY

## 2022-06-01 PROCEDURE — 99999 PR OFFICE/OUTPT VISIT,PROCEDURE ONLY: CPT | Performed by: OBSTETRICS & GYNECOLOGY

## 2022-06-01 PROCEDURE — 76805 OB US >/= 14 WKS SNGL FETUS: CPT | Performed by: OBSTETRICS & GYNECOLOGY

## 2022-06-10 ENCOUNTER — ROUTINE PRENATAL (OUTPATIENT)
Dept: OBGYN CLINIC | Age: 32
End: 2022-06-10

## 2022-06-10 VITALS — WEIGHT: 293 LBS | BODY MASS INDEX: 47.61 KG/M2 | DIASTOLIC BLOOD PRESSURE: 78 MMHG | SYSTOLIC BLOOD PRESSURE: 120 MMHG

## 2022-06-10 DIAGNOSIS — Z34.93 PRENATAL CARE IN THIRD TRIMESTER: Primary | ICD-10-CM

## 2022-06-10 DIAGNOSIS — Z3A.33 33 WEEKS GESTATION OF PREGNANCY: ICD-10-CM

## 2022-06-10 PROCEDURE — 0502F SUBSEQUENT PRENATAL CARE: CPT | Performed by: OBSTETRICS & GYNECOLOGY

## 2022-06-10 NOTE — PROGRESS NOTES
Jennifer Shook is a 29 y.o. female 33w4d        OB History    Para Term  AB Living   1 0 0 0 0 0   SAB IAB Ectopic Molar Multiple Live Births   0 0 0   0        # Outcome Date GA Lbr Osiel/2nd Weight Sex Delivery Anes PTL Lv   1 Current                Vitals  BP: 120/78  Weight: 295 lb (133.8 kg)  Patient Position: Sitting  Albumin: Negative  Glucose: Negative      The patient was seen and evaluated. There was positive fetal movements. No contractions or leakage of fluid. Signs and symptoms of  labor as well as labor were reviewed. The S/S of Pre-Eclampsia were reviewed with the patient in detail. She is to report any of these if they occur. She currently denies any of these. The patient had her 28 week labs completed. No visits with results within 5 Week(s) from this visit. Latest known visit with results is:   Hospital Outpatient Visit on 2022   Component Date Value Ref Range Status    Amount Glucose Given 2022 100  g Final    Glucose, Fasting 2022 79  65 - 94 mg/dL Final    Glucose, GTT - 1 Hour 2022 172  65 - 179 mg/dL Final    Glucose, GTT - 2 Hour 2022 114  65 - 154 mg/dL Final    3 Hr Glucose 2022 138  65 - 139 mg/dL Final   ]      Last Pap 2021      Estimated Date of Delivery: 22   CAPO By LMP/ TVUS:  Ultrasound Date  12/15/21      [facility] : [facility]  Gender: girl  Partner:    PRENATAL LAB RESULTS:      Pre-pregnancy: BMI  kg/m²    Blood Type/Rh: O+  Antibody Screen: negative  Initial Hemoglobin, Hematocrit, Platelets: 09.2/ 54.6/ 398  Rubella: immune  T.  Pallidum, IgG: nonreactive  Hepatitis B Surface Antigen: nonreactive  Hepatitis C Antibody: nonreactive  HIV: nonreactive  Sickle Cell Screen:   Gonorrhea: neg  Chlamydia: neg  Urine culture:  negative date: 21  Initial urine drug screen:  negative  date: 21  Cystic Fibrosis Screen:   First Trimester Screen:  22 negative  MSAFP/Multiple Markers: not done  Non-Invasive Prenatal Testing: not done      Early 1 hour Glucose Tolerance Test: 133  Early 3 hour Glucose Tolerance Test:   1 hour Glucose Tolerance Test: 142 Abnormal  3 hour Glucose Tolerance Test:     Anatomy US: Placenta-  anterior  Vessel cord # -  3  Cord insertion: normal  Cervical length: 4.61cm  Anatomy: wnl    Group B Strep:  +GBS Urine Date: 2022             Tdap: 22 Tdap given  Flu shot:   COVID Shot:   Breast pump RX:  Medicaid/ WIC referral:           T-Dap Vaccine (27-36 weeks): awaiting    The patient was instructed on fetal kick counts and was given a kick sheet to complete every 8 hours. She was instructed that the baby should move at a minimum of ten times within one hour after a meal. The patient was instructed to lay down on her left side twenty minutes after eating and count movements for up to one hour with a target value of ten movements. She was instructed to notify the office if she did not make that target after two attempts or if after any attempt there was less than four movements. The patient reports that the targets have been made Yes.  Testing:  None                Assessment:  1Salomon Robert is a 29 y.o. female  2.    3. 33w4d    Patient Active Problem List    Diagnosis Date Noted    Need for diphtheria-tetanus-pertussis (Tdap) vaccine 2022     Priority: Medium    COVID-19 vaccine administered 2022     Priority: Medium    Hypothyroid in pregnancy, antepartum 2022     Priority: Medium     needs TSH q 4 weeks; o Synthroid 88 mg  TSH 0.43 on 22  TSH 0.68 2/10/22   4/22/22 TSH      0.63         T4          High-risk pregnancy 2022     Imitrex but has not taken since she found out she was pregnant  Stopped taking her Actos once she found out she was pregnant      Obesity in pregnancy 2022     Pre pregnant BMI- 45.67  Growth US  Asa @13 weeks  Early GTT- 133      Hypothyroid 2022    Elv Early 1 hr GTT, Needs Early 3 hr ordered 2022     Early 1 hr GTT: 133  Early 3 hr GTT: **      Anxiety 2017     No meds      PCOS      Was previously on metformin      GERD 2011        Diagnosis Orders   1. Prenatal care in third trimester     2. 33 weeks gestation of pregnancy               Plan:  The patient will return to the office for her next visit in 2 weeks. See antepartum flow sheet. Counseled on s/s of preeclampsia. Counseled on s/s of  labor. Counseled on fetal movement      Testing Indicated: No  Scheduled with Nursing-Pt notified: No      Patient was seen with total face to face time of 20 minutes. More than 50% of this visit was on counseling and education regarding her    Diagnosis Orders   1. Prenatal care in third trimester     2. 33 weeks gestation of pregnancy      and her options. She was also counseled on her preventative health maintenance recommendations and follow-up.

## 2022-06-23 ENCOUNTER — ROUTINE PRENATAL (OUTPATIENT)
Dept: OBGYN CLINIC | Age: 32
End: 2022-06-23

## 2022-06-23 VITALS — WEIGHT: 293 LBS | DIASTOLIC BLOOD PRESSURE: 62 MMHG | SYSTOLIC BLOOD PRESSURE: 120 MMHG | BODY MASS INDEX: 48.91 KG/M2

## 2022-06-23 DIAGNOSIS — M54.50 LOW BACK PAIN DURING PREGNANCY IN THIRD TRIMESTER: ICD-10-CM

## 2022-06-23 DIAGNOSIS — O99.280 HYPOTHYROID IN PREGNANCY, ANTEPARTUM: ICD-10-CM

## 2022-06-23 DIAGNOSIS — Z3A.35 35 WEEKS GESTATION OF PREGNANCY: ICD-10-CM

## 2022-06-23 DIAGNOSIS — E03.9 HYPOTHYROID IN PREGNANCY, ANTEPARTUM: ICD-10-CM

## 2022-06-23 DIAGNOSIS — Z34.93 PRENATAL CARE IN THIRD TRIMESTER: Primary | ICD-10-CM

## 2022-06-23 DIAGNOSIS — O26.893 LOW BACK PAIN DURING PREGNANCY IN THIRD TRIMESTER: ICD-10-CM

## 2022-06-23 PROCEDURE — 0502F SUBSEQUENT PRENATAL CARE: CPT | Performed by: NURSE PRACTITIONER

## 2022-06-23 ASSESSMENT — PATIENT HEALTH QUESTIONNAIRE - PHQ9
SUM OF ALL RESPONSES TO PHQ QUESTIONS 1-9: 0
SUM OF ALL RESPONSES TO PHQ QUESTIONS 1-9: 0
1. LITTLE INTEREST OR PLEASURE IN DOING THINGS: 0
2. FEELING DOWN, DEPRESSED OR HOPELESS: 0
SUM OF ALL RESPONSES TO PHQ QUESTIONS 1-9: 0
SUM OF ALL RESPONSES TO PHQ QUESTIONS 1-9: 0
SUM OF ALL RESPONSES TO PHQ9 QUESTIONS 1 & 2: 0

## 2022-06-23 NOTE — PROGRESS NOTES
medications for this visit. Past Medical History:   Diagnosis Date    Migraines 2017    Previously on Imitrex but stopped once she found out she was pregnanct    PCOS (polycystic ovarian syndrome)        Past Surgical History:   Procedure Laterality Date    TONSILLECTOMY  2013    WISDOM TOOTH EXTRACTION  2011     Headaches: no  Swelling: no, did have some swelling to ankles last weekend states was hot though and no headaches or vision changes and checked BP and was normal.  S/S preeclampsia discussed and notify us elevated BP above 140/90, if above 160/110 discussed notify immediately and go to L &D. Bleeding: no  Discharge: no   Dysuria: no  Nausea: no  Heartburn: no  Epigastric pain: no  Contractions: no  Leakage of fluid: no  + fetal movement       Return 1 WEEK    Labs as indicated n/a    PTL signs reviewed, if indicated  Kick Count Instructions given if indicated: The patient was instructed on fetal kick counts and was given a kick sheet to complete every 8 hours. This is to begin at 28 weeks gestation. She was instructed that the baby should move at a minimum of ten times within one hour after a meal. The patient was instructed to lay down on her left side twenty minutes after eating and count movements for up to one hour with a target value of ten movements. She was instructed to notify the office if she did not make that target after two attempts or if after any attempt there was less than four movements. After hour numbers reviewed , along with labor signs if indicated. Contractions/cramping between 5-7 minutes and persisting even with attempts of increased water and laying on side. Fluid leakage, bleeding  NST information given no  The patient was counseled on Labor & Delivery. Route of delivery and counseling on vaginal, operative vaginal, and  sections were completed with the risks of each to both the patient as well as her baby.  The possibility of a blood transfusion was discussed as well. The patient was not opposed to receiving a transfusion if needed. The patient was counseled on types of analgesia during labor and is considering either Regional or IV medication the risks, benefits and alternatives were discussed. The patient was counseled on the mandatory call ahead policy. She has been instructed to call the office at anytime prior to going into the hospital so the on-call physician may direct her to the appropriate facility for care. Exceptions were reviewed including but not limited to: Decreased fetal movement, vaginal Bleeding or hemorrhage, trauma, readily expectant delivery, or any instance where she feels 911 should be utilized. Of the 20 minute duration appointment visit, Kendal Atkins CNP spent at least 50% of the face-to-face time in counseling, explanation of diagnosis, planning of further management, and answering all questions.

## 2022-06-23 NOTE — PATIENT INSTRUCTIONS

## 2022-06-29 RX ORDER — LEVOTHYROXINE SODIUM 88 UG/1
TABLET ORAL
Qty: 30 TABLET | Refills: 0 | Status: SHIPPED | OUTPATIENT
Start: 2022-06-29 | End: 2022-08-10 | Stop reason: SDUPTHER

## 2022-06-30 ENCOUNTER — ROUTINE PRENATAL (OUTPATIENT)
Dept: PERINATAL CARE | Age: 32
End: 2022-06-30
Payer: COMMERCIAL

## 2022-06-30 VITALS
TEMPERATURE: 98 F | HEIGHT: 66 IN | WEIGHT: 293 LBS | SYSTOLIC BLOOD PRESSURE: 120 MMHG | RESPIRATION RATE: 16 BRPM | BODY MASS INDEX: 47.09 KG/M2 | HEART RATE: 100 BPM | DIASTOLIC BLOOD PRESSURE: 85 MMHG

## 2022-06-30 DIAGNOSIS — O99.213 OBESITY AFFECTING PREGNANCY IN THIRD TRIMESTER: ICD-10-CM

## 2022-06-30 DIAGNOSIS — Z3A.36 36 WEEKS GESTATION OF PREGNANCY: ICD-10-CM

## 2022-06-30 DIAGNOSIS — O99.810 ABNORMAL MATERNAL GLUCOSE TOLERANCE, ANTEPARTUM: ICD-10-CM

## 2022-06-30 DIAGNOSIS — E03.9 HYPOTHYROIDISM AFFECTING PREGNANCY IN THIRD TRIMESTER: Primary | ICD-10-CM

## 2022-06-30 DIAGNOSIS — O99.283 HYPOTHYROIDISM AFFECTING PREGNANCY IN THIRD TRIMESTER: Primary | ICD-10-CM

## 2022-06-30 DIAGNOSIS — Z36.4 ANTENATAL SCREENING FOR FETAL GROWTH RETARDATION USING ULTRASONICS: ICD-10-CM

## 2022-06-30 PROCEDURE — 76816 OB US FOLLOW-UP PER FETUS: CPT | Performed by: OBSTETRICS & GYNECOLOGY

## 2022-06-30 PROCEDURE — 99999 PR OFFICE/OUTPT VISIT,PROCEDURE ONLY: CPT | Performed by: OBSTETRICS & GYNECOLOGY

## 2022-06-30 PROCEDURE — 76819 FETAL BIOPHYS PROFIL W/O NST: CPT | Performed by: OBSTETRICS & GYNECOLOGY

## 2022-07-08 ENCOUNTER — ROUTINE PRENATAL (OUTPATIENT)
Dept: OBGYN CLINIC | Age: 32
End: 2022-07-08

## 2022-07-08 VITALS — DIASTOLIC BLOOD PRESSURE: 64 MMHG | WEIGHT: 293 LBS | SYSTOLIC BLOOD PRESSURE: 120 MMHG | BODY MASS INDEX: 49.39 KG/M2

## 2022-07-08 DIAGNOSIS — Z3A.37 37 WEEKS GESTATION OF PREGNANCY: Primary | ICD-10-CM

## 2022-07-08 PROCEDURE — 0502F SUBSEQUENT PRENATAL CARE: CPT | Performed by: OBSTETRICS & GYNECOLOGY

## 2022-07-09 NOTE — PROGRESS NOTES
Lisa Brown is a 29 y.o. female 37w5d        OB History    Para Term  AB Living   1 0 0 0 0 0   SAB IAB Ectopic Molar Multiple Live Births   0 0 0   0        # Outcome Date GA Lbr Osiel/2nd Weight Sex Delivery Anes PTL Lv   1 Current                Vitals  BP: 120/64  Weight: (!) 306 lb (138.8 kg)  Patient Position: Sitting  Albumin: Negative  Glucose: Negative  Fundal Height (cm): 37 cm  Fetal Heart Rate: 130  Movement: Present  Dilation (cm): Closed  Effacement: 50  Station: -3      The patient was seen and evaluated. There was positive fetal movements. No contractions or leakage of fluid. Signs and symptoms of labor were reviewed. The S/S of Pre-Eclampsia were reviewed with the patient in detail. She is to report any of these if they occur. She currently denies any of these. The patient was instructed on fetal kick counts and was given a kick sheet to complete every 8 hours. She was instructed that the baby should move at a minimum of ten times within one hour after a meal. The patient was instructed to lay down on her left side twenty minutes after eating and count movements for up to one hour with a target value of ten movements. She was instructed to notify the office if she did not make that target after two attempts or if after any attempt there was less than four movements. The patient reports that the targets have been made Yes. Last Pap 2021      Estimated Date of Delivery: 22   CAPO By LMP/ TVUS:  Ultrasound Date  12/15/21      [facility]   Gender: girl  Partner:    PRENATAL LAB RESULTS:      Pre-pregnancy: BMI  kg/m²    Blood Type/Rh: O+  Antibody Screen: negative  Initial Hemoglobin, Hematocrit, Platelets: 29.6/ 94.8/ 398  Rubella: immune  T.  Pallidum, IgG: nonreactive  Hepatitis B Surface Antigen: nonreactive  Hepatitis C Antibody: nonreactive  HIV: nonreactive  Sickle Cell Screen:   Gonorrhea: neg  Chlamydia: neg  Urine culture: negative date: 12/22/21  Initial urine drug screen:  negative  date: 12/22/21  Cystic Fibrosis Screen:   First Trimester Screen:  1/20/22 negative  MSAFP/Multiple Markers: not done  Non-Invasive Prenatal Testing: not done      Early 1 hour Glucose Tolerance Test: 133  Early 3 hour Glucose Tolerance Test:   1 hour Glucose Tolerance Test: 142 Abnormal  3 hour Glucose Tolerance Test: normal limits    Anatomy US: Placenta-  anterior  Vessel cord # -  3  Cord insertion: normal  Cervical length: 4.61cm  Anatomy: wnl    Group B Strep:  +GBS Urine Date: 4/2022             Tdap: 5/12/22 Tdap given  Flu shot:   COVID Shot:   Breast pump RX:  Medicaid/ WIC referral:           T-Dap Vaccine Completed (27-36 weeks): Yes    Allergies: Allergies as of 07/08/2022    (No Known Allergies)         Group Beta Strep collection was completed. No: + GBS bacteruria  GBS Results:   No visits with results within 3 Week(s) from this visit. Latest known visit with results is:   Hospital Outpatient Visit on 05/05/2022   Component Date Value Ref Range Status    Amount Glucose Given 05/05/2022 100  g Final    Glucose, Fasting 05/05/2022 79  65 - 94 mg/dL Final    Glucose, GTT - 1 Hour 05/05/2022 172  65 - 179 mg/dL Final    Glucose, GTT - 2 Hour 05/05/2022 114  65 - 154 mg/dL Final    3 Hr Glucose 05/05/2022 138  65 - 139 mg/dL Final   ]        Cervical Exam was:   closed cm dilated, 50 effaced, -3 station, cervical position post    The patient was counseled on the mandatory call ahead policy. She has been instructed to call the office at anytime prior to going into the hospital so the on-call physician may direct her to the appropriate facility for care. Exceptions were reviewed including but not limited to: Decreased fetal movement, vaginal Bleeding or hemorrhage, trauma, readily expectant delivery, or any instance where she feels 911 should be utilized. The patient was counseled on Labor & Delivery.    Route of delivery and counseling on vaginal, operative vaginal, and  sections were completed with the risks of each to both the patient as well as her baby. The possibility of a blood transfusion was discussed as well. The patient was not opposed to receiving a transfusion if needed. The patient was counseled on types of analgesia during labor and is considering either Regional or IV medication the risks, benefits and alternatives were discussed.  Testing:  None                    Assessment:  Rizwan Jang is a 29 y.o. female  2.   3. 37w5d    Patient Active Problem List    Diagnosis Date Noted    Need for diphtheria-tetanus-pertussis (Tdap) vaccine 2022     Priority: Medium    COVID-19 vaccine administered 2022     Priority: Medium    Hypothyroid in pregnancy, antepartum 2022     Priority: Medium     Overview Note:     needs TSH q 4 weeks; o Synthroid 88 mg  TSH 0.43 on 22  TSH 0.68 2/10/22   4/22/22 TSH      0.63         T4          High-risk pregnancy 2022     Overview Note:     Imitrex but has not taken since she found out she was pregnant  Stopped taking her Actos once she found out she was pregnant      Obesity in pregnancy 2022     Overview Note:     Pre pregnant BMI- 45.67  Growth US  Asa @13 weeks  Early GTT- 133      Hypothyroid 2022    Elv Early 1 hr GTT, Needs Early 3 hr ordered 2022     Overview Note:     Early 1 hr GTT: 133  Early 3 hr GTT: **      Anxiety 2017     Overview Note:     No meds      PCOS      Overview Note:     Was previously on metformin      GERD 2011        Diagnosis Orders   1. 37 weeks gestation of pregnancy             Plan:  The patient will return to the office for her next visit in 1 weeks. See antepartum flow sheet. Counseled on s/s of labor. Counseled on s/s of preeclampsia. Fetal movement discussed      Patient was seen with total face to face time of 20 minutes.  More than 50% of this visit was on counseling and education regarding her    Diagnosis Orders   1. 37 weeks gestation of pregnancy      and her options. She was also counseled on her preventative health maintenance recommendations and follow-up.

## 2022-07-14 ENCOUNTER — ROUTINE PRENATAL (OUTPATIENT)
Dept: OBGYN CLINIC | Age: 32
End: 2022-07-14

## 2022-07-14 VITALS — BODY MASS INDEX: 49.87 KG/M2 | DIASTOLIC BLOOD PRESSURE: 70 MMHG | SYSTOLIC BLOOD PRESSURE: 118 MMHG | WEIGHT: 293 LBS

## 2022-07-14 DIAGNOSIS — Z3A.38 38 WEEKS GESTATION OF PREGNANCY: ICD-10-CM

## 2022-07-14 DIAGNOSIS — Z34.93 PRENATAL CARE IN THIRD TRIMESTER: Primary | ICD-10-CM

## 2022-07-14 PROCEDURE — 0502F SUBSEQUENT PRENATAL CARE: CPT | Performed by: NURSE PRACTITIONER

## 2022-07-14 NOTE — PROGRESS NOTES
Presents for OB visit  Gestation 38w3d  Estimated Date of Delivery: 22      Last Pap 2021      Estimated Date of Delivery: 22   CAPO By LMP/ TVUS:  Ultrasound Date  12/15/21      [facility]   Gender: girl  Partner:    PRENATAL LAB RESULTS:      Pre-pregnancy: BMI  kg/m²    Blood Type/Rh: O+  Antibody Screen: negative  Initial Hemoglobin, Hematocrit, Platelets: 82.4/ 04.5/ 398  Rubella: immune  T.  Pallidum, IgG: nonreactive  Hepatitis B Surface Antigen: nonreactive  Hepatitis C Antibody: nonreactive  HIV: nonreactive  Sickle Cell Screen:   Gonorrhea: neg  Chlamydia: neg  Urine culture:  negative date: 21  Initial urine drug screen:  negative  date: 21  Cystic Fibrosis Screen:   First Trimester Screen:  22 negative  MSAFP/Multiple Markers: not done  Non-Invasive Prenatal Testing: not done      Early 1 hour Glucose Tolerance Test: 133  Early 3 hour Glucose Tolerance Test:   1 hour Glucose Tolerance Test: 142 Abnormal  3 hour Glucose Tolerance Test: normal limits    Anatomy US: Placenta-  anterior  Vessel cord # -  3  Cord insertion: normal  Cervical length: 4.61cm  Anatomy: wnl    Group B Strep:  +GBS Urine Date: 2022             Tdap: 22 Tdap given  Flu shot:   COVID Shot:   Breast pump RX:  Medicaid/ WIC referral:                        OB History    Para Term  AB Living   1 0 0 0 0 0   SAB IAB Ectopic Molar Multiple Live Births   0 0 0   0        # Outcome Date GA Lbr Osiel/2nd Weight Sex Delivery Anes PTL Lv   1 Current                     No Known Allergies  Current Outpatient Medications   Medication Sig Dispense Refill    levothyroxine (SYNTHROID) 88 MCG tablet TAKE 1 TABLET BY MOUTH IN THE MORNING IMMEDIATELY UPON ARISING, DELAY EATING/DRINKING FOR 30 MINUTES 30 tablet 0    Prenatal Vit-Fe Fumarate-FA (PRENATAL VITAMINS PO) Take 1 tablet by mouth daily      aspirin 81 MG chewable tablet Take 81 mg by mouth daily       No current facility-administered medications for this visit. Past Medical History:   Diagnosis Date    Migraines 2017    Previously on Imitrex but stopped once she found out she was pregnanct    PCOS (polycystic ovarian syndrome)        Past Surgical History:   Procedure Laterality Date    TONSILLECTOMY  2013    WISDOM TOOTH EXTRACTION  2011     Headaches: no  Swelling: no  Bleeding: no  Discharge: no   Dysuria: no  Nausea: no  Heartburn: no  Epigastric pain: no  Contractions: no  Leakage of fluid: no  + fetal movement       Return 1 WEEK    Labs as indicated n/a    PTL signs reviewed, if indicated  Kick Count Instructions given if indicated: The patient was instructed on fetal kick counts and was given a kick sheet to complete every 8 hours. This is to begin at 28 weeks gestation. She was instructed that the baby should move at a minimum of ten times within one hour after a meal. The patient was instructed to lay down on her left side twenty minutes after eating and count movements for up to one hour with a target value of ten movements. She was instructed to notify the office if she did not make that target after two attempts or if after any attempt there was less than four movements. After hour numbers reviewed , along with labor signs if indicated. Contractions/cramping between 5-7 minutes and persisting even with attempts of increased water and laying on side. Fluid leakage, bleeding  NST information given no  The patient was counseled on Labor & Delivery. Route of delivery and counseling on vaginal, operative vaginal, and  sections were completed with the risks of each to both the patient as well as her baby. The possibility of a blood transfusion was discussed as well. The patient was not opposed to receiving a transfusion if needed.  The patient was counseled on types of analgesia during labor and is considering either Regional or IV medication the risks, benefits and alternatives were discussed. The patient was counseled on the mandatory call ahead policy. She has been instructed to call the office at anytime prior to going into the hospital so the on-call physician may direct her to the appropriate facility for care. Exceptions were reviewed including but not limited to: Decreased fetal movement, vaginal Bleeding or hemorrhage, trauma, readily expectant delivery, or any instance where she feels 911 should be utilized. Of the 20 minute duration appointment visit, Federico Valiente CNP spent at least 50% of the face-to-face time in counseling, explanation of diagnosis, planning of further management, and answering all questions.

## 2022-07-14 NOTE — PATIENT INSTRUCTIONS
After Hours Number: You can call the office [phone]  or [phone]  Call if you have:  1. Bleeding like a period  2. Cramps or contractions greater than 2 hours  3. If you are leaking fluid  4. If you've a fever greater than 100°  5. If you feel as if baby is not moving  6. If you have continuous vomiting over 3-4 hours   Counting Your Baby's Kicks: Care Instructions  Your Care Instructions    Counting your baby's kicks is one way your doctor can tell that your baby is healthy. Most women--especially in a first pregnancy--feel their baby move for the first time between 16 and 22 weeks. The movement may feel like flutters rather than kicks. Your baby may move more at certain times of the day. When you are active, you may notice less kicking than when you are resting. At your prenatal visits, your doctor will ask whether the baby is active. In your last trimester, your doctor may ask you to count the number of times you feel your baby move. Follow-up care is a key part of your treatment and safety. Be sure to make and go to all appointments, and call your doctor if you are having problems. It's also a good idea to know your test results and keep a list of the medicines you take. How do you count fetal kicks? · A common method of checking your baby's movement is to count the number of kicks or moves you feel in 1 hour. Ten movements (such as kicks, flutters, or rolls) in 1 hour are normal. Some doctors suggest that you count in the morning until you get to 10 movements. Then you can quit for that day and start again the next day. · Pick your baby's most active time of day to count. This may be any time from morning to evening. · If you do not feel 10 movements in an hour, your baby may be sleeping. Wait for the next hour and count again. When should you call for help?   Call your doctor now or seek immediate medical care if:    · You noticed that your baby has stopped moving or is moving much less than normal.    Watch closely for changes in your health, and be sure to contact your doctor if you have any problems. Where can you learn more? Go to https://chpepiceweb.Hyperpublic. org and sign in to your [employer] account. Enter F982 in the i-design Multimedia box to learn more about \"Counting Your Baby's Kicks: Care Instructions. \"     If you do not have an account, please click on the \"Sign Up Now\" link. Current as of: September 5, 2018  Content Version: 12.0  © 1247-9017 [employer]. Care instructions adapted under license by [facility] ([facility]). If you have questions about a medical condition or this instruction, always ask your healthcare professional. [address] any warranty or liability for your use of this information. Preeclampsia: Care Instructions  Overview    Preeclampsia occurs when a woman's blood pressure rises during pregnancy. Often with preeclampsia, you also have swelling in your legs, hands, and face. A test may show too much protein in your urine. Preeclampsia is also called toxemia. If preeclampsia is severe and not treated, it can lead to seizures (eclampsia) and damage to your liver or kidneys. Preeclampsia can prevent your baby from getting enough food and oxygen. This can cause a low birth weight or other problems. Your doctor will watch you closely to prevent these problems. He or she also may recommend that you reduce your activity. If your preeclampsia is a danger to your health or the health of your baby, your doctor may need to deliver your baby early. While preeclampsia is a concern, most women with preeclampsia have healthy babies. After a woman gives birth, preeclampsia usually goes away on its own. But symptoms may last a few weeks or more and can get worse after delivery. Rarely, symptoms of preeclampsia don't show up until days or even weeks after childbirth. Follow-up care is a key part of your treatment and safety.  Be sure to make and go to all appointments, and call your doctor if you are having problems. It's also a good idea to know your test results and keep a list of the medicines you take. How can you care for yourself at home? · Take and record your blood pressure at home if your doctor tells you to. ? Learn the importance of the two measures of blood pressure (such as 120 over 80, or 120/80). The first number is the systolic pressure, which is the force of blood on the artery walls as the heart pumps. The second number is the diastolic pressure, which is the force of blood on the artery walls between heartbeats, when the heart is at rest. You have a choice of monitors to use. ? Manual monitor: You pump up the cuff and use a stethoscope to listen for your pulse. ? Electronic monitor: The cuff inflates, and a gauge shows your pulse rate. ? To take your blood pressure:  ? Ask your doctor to check your blood pressure monitor to be sure that it is accurate and that the cuff fits you. Also ask your doctor to watch you to make sure that you are using it right. ? You should not eat, use tobacco products, or use medicine known to raise blood pressure (such as some nasal decongestant sprays) before you take your blood pressure. ? Avoid taking your blood pressure if you have just exercised. Also avoid taking it if you are nervous or upset. Rest at least 15 minutes before you take your blood pressure. · If your doctor advises, check the protein levels in your urine. Your doctor or nurse will show you how to do this. · Take your medicines exactly as prescribed. Call your doctor if you think you are having a problem with your medicine. · Do not smoke. Quitting smoking will help improve your baby's growth and health. If you need help quitting, talk to your doctor about stop-smoking programs and medicines. These can increase your chances of quitting for good.   · Eat a balanced and healthy diet that has lots of fruits and vegetables. · If your doctor advised bed rest, be sure to stay off your feet and rest as much as possible. ? Keep a phone, phone book, notepad, and pen near the bed where you can easily reach them. ? Gently stretch your legs every hour to maintain good blood flow. ? Have another family member pack snacks and lunch food in a cooler close to your bed. ? Use this time for activities that you usually cannot find time for, such as reading, craft projects, or letter writing. · You can keep track of your baby's health by noting the length of time it takes to count 10 movements (such as kicks, flutters, or rolls). Feeling 10 movements in less than 1 hour is considered normal. Track your baby's movements once each day. Bring this record with you to each prenatal visit. When should you call for help? Call 911 anytime you think you may need emergency care. For example, call if:    · You passed out (lost consciousness). · You have a seizure. Call your doctor now or seek immediate medical care if:    · You have symptoms of preeclampsia, such as:  ? Sudden swelling of your face, hands, or feet. ? New vision problems (such as dimness or blurring). ? A severe headache. · Your blood pressure is higher than it should be, or it rises suddenly. · You have new nausea or vomiting. · You think that you are in labor. · You have pain in your belly or pelvis. Watch closely for changes in your health, and be sure to contact your doctor if:    · You gain weight rapidly. Where can you learn more? Go to https://LuminatefaisalBolt.One [employer] Stop. org and sign in to your [employer] account. Enter U804 in the [employer]ol-e-games box to learn more about \"Preeclampsia: Care Instructions. \"     If you do not have an account, please click on the \"Sign Up Now\" link. Current as of: September 5, 2018  Content Version: 12.0  © 7752-0178 [employer]. Care instructions adapted under license by [facility] ([facility]).  If you have questions about a medical condition or this instruction, always ask your healthcare professional. [address] any warranty or liability for your use of this information.  Labor: Care Instructions  Your Care Instructions     labor is the start of labor between 21 and 36 weeks of pregnancy. A full-term pregnancy lasts 37 to 42 weeks. In labor, the uterus contracts to open the cervix. This is the first stage of childbirth.  labor can be caused by a problem with the baby, the mother, or both. Often the cause is not known. In some cases, doctors use medicines to try to delay labor until 29 or more weeks of pregnancy. By this time, a baby has grown enough so that problems are not likely. In some cases--such as with a serious infection--it is healthier for the baby to be born early. Your treatment will depend on how far along you are in your pregnancy and on your health and your baby's health. Follow-up care is a key part of your treatment and safety. Be sure to make and go to all appointments, and call your doctor if you are having problems. It's also a good idea to know your test results and keep a list of the medicines you take. How can you care for yourself at home? · If your doctor prescribed medicines, take them exactly as directed. Call your doctor if you think you are having a problem with your medicine. · Rest until your doctor advises you about activity. He or she will tell you if you should stay in bed most of the time. You may need to arrange for  if you have young children. · Do not have sexual intercourse unless your doctor says it is safe. · Use pads, not tampons, if you have vaginal bleeding. · Make sure to drink plenty of fluids. Dehydration can lead to contractions. If you have kidney, heart, or liver disease and have to limit fluids, talk with your doctor before you increase the amount of fluids you drink.   · Do not smoke or instructions adapted under license by [facility] ([facility]). If you have questions about a medical condition or this instruction, always ask your healthcare professional. [address] any warranty or liability for your use of this information.

## 2022-07-17 ENCOUNTER — HOSPITAL ENCOUNTER (OUTPATIENT)
Age: 32
Discharge: HOME | End: 2022-07-17
Attending: SPECIALIST | Admitting: SPECIALIST
Payer: COMMERCIAL

## 2022-07-17 VITALS
HEART RATE: 79 BPM | SYSTOLIC BLOOD PRESSURE: 123 MMHG | OXYGEN SATURATION: 97 % | TEMPERATURE: 98.4 F | DIASTOLIC BLOOD PRESSURE: 74 MMHG | RESPIRATION RATE: 16 BRPM

## 2022-07-17 PROBLEM — R80.9 PROTEINURIA: Status: ACTIVE | Noted: 2022-07-17

## 2022-07-17 PROBLEM — O16.9 ELEVATED BLOOD PRESSURE AFFECTING PREGNANCY, ANTEPARTUM: Status: ACTIVE | Noted: 2022-07-17

## 2022-07-17 PROBLEM — Z3A.38 38 WEEKS GESTATION OF PREGNANCY: Status: ACTIVE | Noted: 2022-07-17

## 2022-07-17 LAB
ABSOLUTE EOS #: 0.05 K/UL (ref 0–0.44)
ABSOLUTE IMMATURE GRANULOCYTE: 0.03 K/UL (ref 0–0.3)
ABSOLUTE LYMPH #: 2.05 K/UL (ref 1.1–3.7)
ABSOLUTE MONO #: 0.66 K/UL (ref 0.1–1.2)
ALBUMIN SERPL-MCNC: 2.7 G/DL (ref 3.5–5.2)
ALBUMIN/GLOBULIN RATIO: 0.8 (ref 1–2.5)
ALP BLD-CCNC: 125 U/L (ref 35–104)
ALT SERPL-CCNC: 7 U/L (ref 5–33)
ANION GAP SERPL CALCULATED.3IONS-SCNC: 13 MMOL/L (ref 9–17)
AST SERPL-CCNC: 13 U/L
BASOPHILS # BLD: 1 % (ref 0–2)
BASOPHILS ABSOLUTE: 0.04 K/UL (ref 0–0.2)
BILIRUB SERPL-MCNC: <0.1 MG/DL (ref 0.3–1.2)
BUN BLDV-MCNC: 6 MG/DL (ref 6–20)
CALCIUM SERPL-MCNC: 8.6 MG/DL (ref 8.6–10.4)
CHLORIDE BLD-SCNC: 104 MMOL/L (ref 98–107)
CO2: 19 MMOL/L (ref 20–31)
CREAT SERPL-MCNC: 0.49 MG/DL (ref 0.5–0.9)
CREATININE URINE: 43.9 MG/DL (ref 28–217)
EOSINOPHILS RELATIVE PERCENT: 1 % (ref 1–4)
GFR AFRICAN AMERICAN: >60 ML/MIN
GFR NON-AFRICAN AMERICAN: >60 ML/MIN
GFR SERPL CREATININE-BSD FRML MDRD: ABNORMAL ML/MIN/{1.73_M2}
GLUCOSE BLD-MCNC: 82 MG/DL (ref 70–99)
HCT VFR BLD CALC: 35.4 % (ref 36.3–47.1)
HEMOGLOBIN: 11.2 G/DL (ref 11.9–15.1)
IMMATURE GRANULOCYTES: 0 %
LYMPHOCYTES # BLD: 24 % (ref 24–43)
MCH RBC QN AUTO: 26.6 PG (ref 25.2–33.5)
MCHC RBC AUTO-ENTMCNC: 31.6 G/DL (ref 28.4–34.8)
MCV RBC AUTO: 84.1 FL (ref 82.6–102.9)
MONOCYTES # BLD: 8 % (ref 3–12)
NRBC AUTOMATED: 0 PER 100 WBC
PDW BLD-RTO: 13.8 % (ref 11.8–14.4)
PLATELET # BLD: 333 K/UL (ref 138–453)
PMV BLD AUTO: 10.2 FL (ref 8.1–13.5)
POTASSIUM SERPL-SCNC: 3.8 MMOL/L (ref 3.7–5.3)
RBC # BLD: 4.21 M/UL (ref 3.95–5.11)
SEG NEUTROPHILS: 66 % (ref 36–65)
SEGMENTED NEUTROPHILS ABSOLUTE COUNT: 5.82 K/UL (ref 1.5–8.1)
SODIUM BLD-SCNC: 136 MMOL/L (ref 135–144)
TOTAL PROTEIN, URINE: 67 MG/DL
TOTAL PROTEIN: 6 G/DL (ref 6.4–8.3)
URINE TOTAL PROTEIN CREATININE RATIO: 1.53 (ref 0–0.2)
WBC # BLD: 8.7 K/UL (ref 3.5–11.3)

## 2022-07-17 PROCEDURE — 36415 COLL VENOUS BLD VENIPUNCTURE: CPT

## 2022-07-17 PROCEDURE — 84156 ASSAY OF PROTEIN URINE: CPT

## 2022-07-17 PROCEDURE — 85025 COMPLETE CBC W/AUTO DIFF WBC: CPT

## 2022-07-17 PROCEDURE — 82570 ASSAY OF URINE CREATININE: CPT

## 2022-07-17 PROCEDURE — 80053 COMPREHEN METABOLIC PANEL: CPT

## 2022-07-17 PROCEDURE — 99213 OFFICE O/P EST LOW 20 MIN: CPT

## 2022-07-17 RX ORDER — ONDANSETRON 4 MG/1
4 TABLET, ORALLY DISINTEGRATING ORAL EVERY 8 HOURS PRN
Status: DISCONTINUED | OUTPATIENT
Start: 2022-07-17 | End: 2022-07-17 | Stop reason: HOSPADM

## 2022-07-17 RX ORDER — ONDANSETRON 2 MG/ML
4 INJECTION INTRAMUSCULAR; INTRAVENOUS EVERY 6 HOURS PRN
Status: DISCONTINUED | OUTPATIENT
Start: 2022-07-17 | End: 2022-07-17 | Stop reason: HOSPADM

## 2022-07-17 RX ORDER — ACETAMINOPHEN 500 MG
1000 TABLET ORAL EVERY 6 HOURS PRN
Status: DISCONTINUED | OUTPATIENT
Start: 2022-07-17 | End: 2022-07-17 | Stop reason: HOSPADM

## 2022-07-17 NOTE — H&P
OBSTETRICAL HISTORY [facility]    Date: 2022       Time: 2:36 PM   Patient Name: Gurdeep Oakes     Patient : 1990  Room/Bed: 0702/0702-01    Admission Date/Time: 2022  2:32 PM      CC: Decreased FM     HPI: Gurdeep Oakes is a 29 y.o. Sheilda Richer at 38w6d who presents c/o decreased FM for the last hour. Patient states this can be common for her because of her anterior placenta but states this went on longer than usual. Patient denies any fever, chills, N/V, headaches, vision changes, chest pain, shortness of breath, RUQ pain, abdominal pain, and increased swelling/tenderness in bilateral lower extremities. Patient denies any vaginal discharge and any urinary complaints. The patient reports fetal movement is present, denies contractions, denies loss of fluid, denies vaginal bleeding. DATING:  LMP: Patient's last menstrual period was 10/18/2021.   Estimated Date of Delivery: 22   Based on: LMP    PREGNANCY RISK FACTORS:  Patient Active Problem List   Diagnosis    PCOS    Anxiety    GERD    Hypothyroid    Elv Early 1 hr GTT, Needs Early 3 hr ordered    Hypothyroid in pregnancy, antepartum    High-risk pregnancy    Obesity in pregnancy    Need for diphtheria-tetanus-pertussis (Tdap) vaccine    COVID-19 vaccine administered        Steroids Given In This Pregnancy:  no     REVIEW OF SYSTEMS:  Constitutional: negative fever, negative chills  HEENT: negative visual disturbances, negative headaches  Respiratory: negative dyspnea, negative cough  Cardiovascular: negative chest pain,  negative palpitations  Gastrointestinal: negative abdominal pain, negative RUQ pain, negative N/V, negative diarrhea, negative constipation  Genitourinary: negative dysuria, negative vaginal discharge  Dermatological: negative rash  Hematologic: negative bruising  Immunologic/Lymphatic: negative recent illness, negative recent sick contact  Musculoskeletal: negative back pain, negative myalgias, negative arthralgias  Neurological:  negative dizziness, negative weakness  Behavior/Psych: negative depression, negative anxiety      OBSTETRICAL HISTORY:   OB History    Para Term  AB Living   1 0 0 0 0 0   SAB IAB Ectopic Molar Multiple Live Births   0 0 0 0 0 0      # Outcome Date GA Lbr Osiel/2nd Weight Sex Delivery Anes PTL Lv   1 Current                PAST MEDICAL HISTORY:   has a past medical history of Migraines and PCOS (polycystic ovarian syndrome). PAST SURGICAL HISTORY:   has a past surgical history that includes Tonsillectomy (2013) and [place] tooth extraction (2011). ALLERGIES:  has No Known Allergies. MEDICATIONS:  Prior to Admission medications    Medication Sig Start Date End Date Taking? Authorizing Provider   levothyroxine (SYNTHROID) 88 MCG tablet TAKE 1 TABLET BY MOUTH IN THE MORNING IMMEDIATELY UPON ARISING, DELAY EATING/DRINKING FOR 30 MINUTES 22   Valente Mccray DO   Prenatal Vit-Fe Fumarate-FA (PRENATAL VITAMINS PO) Take 1 tablet by mouth daily 3/7/18   Historical Provider, MD   aspirin 81 MG chewable tablet Take 81 mg by mouth daily 22   Historical Provider, MD   metFORMIN (GLUCOPHAGE-XR) 500 MG extended release tablet TAKE 4 TABLETS BY MOUTH EVERY DAY WITH BREAKFAST 21   DAVIAN Newsome CNP       FAMILY HISTORY:  family history is not on file. SOCIAL HISTORY:   reports that she has never smoked. She has never used smokeless tobacco. She reports that she does not currently use alcohol. She reports that she does not currently use drugs.     VITALS:  Vitals:    22 1446   BP: (!) 135/93   Pulse: 98   Resp: 16   Temp: 98.4 °F (36.9 °C)   TempSrc: Oral   SpO2: 97%         PHYSICAL EXAM:  Fetal Heart Monitor:  Baseline Heart Rate 135, moderate variability, present accelerations, absent decelerations  [place]: none contractions    General appearance:  no apparent distress, alert, and cooperative  HEENT: head atraumatic, normocephalic, moist mucous membranes, trachea midline  Neurologic:  alert, oriented, normal speech, no focal findings or movement disorder noted  Lungs:  no increased work of breathing, normal chest wall rise bilaterally  Heart:  regular rate and rhythm and no murmur    Abdomen:  soft, gravid, non-tender, and no rebound, guarding, or rigidity,no signs of placenta abruption or chorioamnionitis  Extremities:  no calf tenderness, non edematous  Musculoskeletal: Gross strength equal and intact throughout, no gross abnormalities, range of motion normal in hips, knees, shoulders and spine, CVA tenderness: none  Psychiatric: Mood appropriate, normal affect   Rectal Exam: not indicated  Pelvic Exam: not indicated at this time    LIMITED BEDSIDE US:  Position: Cephalic  Placental Location: anterior  Fetal Heart Tones: Present  Fetal Movement: Present  Amniotic Fluid Index/Volume: 16 cm  BPP:     PRENATAL LAB RESULTS:  Blood Type/Rh: O pos  Antibody Screen: negative  Hemoglobin, Hematocrit, Platelets: 05.5/46.3/002  Rubella: immune  T.  Pallidum, IgG: non-reactive  Hepatitis B Surface Antigen: non-reactive   Hepatitis C Antibody: non-reactive   HIV: non-reactive   Gonorrhea: negative  Chlamydia: negative  Urine culture: positive - GBS 10-20,000, date: 22    Early 1 hour Glucose Tolerance Test: 133  1 hour Glucose Tolerance Test: 142  3 hour Glucose Tolerance Test: Fasting 79/1 hour 172/2 hour 114/3 hour 138    Group B Strep: positive bacteruria  Cystic Fibrosis Screen: declined  Sickle Cell Screen: declined  First Trimester Screen: low risk for aneuploidy  msAFP: ordered, not completed  Anatomy US: anterior placenta, 3VC, Female gender, normal anatomy    ASSESSMENT & PLAN:  Enrrique Alfaro is a 29 y.o. female  at 38w7d    - GBS bacteruria / Rh positive / R immune   - Pen G for GBS prophylaxis if IOL   - VSS, Afebrile     Decreased FM   - Patient reports decreased FM x1 hr   - Patient now endorses FM   - BPP  - Denies LOF, vaginal bleeding, cramping     Elv BP x1 @ 1446   - BP elevated x1 on arrival to triage   - No prior elevated BP in this pregnancy    - Will admit for 4 hours and repeat BP    - Recommend IOL if patient meets criteria for gHTN, not meeting criteria for HTN disorders of pregnancy at this time   - PreE labs ordered   - Patient denies s/s PreE    Hypothyroidism   - Diagnosed 6/2020   - Patient taking Synthroid 88mcg    - Denies current Sx of hypothyroid   - Most recent TSH 0.63 4/22/22    Anxiety   - Controlled on no medication    Elevated 1hr, Nml 3hr     BMI 49.87   - Patient takin ASA 81mg qD      Patient Active Problem List    Diagnosis Date Noted    Need for diphtheria-tetanus-pertussis (Tdap) vaccine 04/22/2022     Priority: Medium    COVID-19 vaccine administered 04/22/2022     Priority: Medium    Hypothyroid in pregnancy, antepartum 04/21/2022     Priority: Medium     needs TSH q 4 weeks; o Synthroid 88 mg  TSH 0.43 on 1/6/22  TSH 0.68 2/10/22   4/22/22 TSH      0.63         T4          High-risk pregnancy 04/21/2022     Imitrex but has not taken since she found out she was pregnant  Stopped taking her Actos once she found out she was pregnant      Obesity in pregnancy 04/21/2022     Pre pregnant BMI- 45.67  Growth US  Asa @13 weeks  Early GTT- 133      Hypothyroid 03/04/2022    Elv Early 1 hr GTT, Needs Early 3 hr ordered 03/04/2022     Early 1 hr GTT: 133  Early 3 hr GTT: **      Anxiety 05/02/2017     No meds      PCOS      Was previously on metformin      GERD 07/14/2011       Plan discussed with Dr. Juan Manuel, who is agreeable. Steroids given this admission: No    Risks, benefits, alternatives and possible complications have been discussed in detail with the patient. Admission, and post admission procedures and expectations were discussed in detail. All questions were answered.     Attending's Name: Dr. Soumya Bennett MD  Ob/Gyn Resident  7/17/2022, 2:36 PM

## 2022-07-17 NOTE — PROGRESS NOTES
Obstetric/Gynecology Resident Interval Note    Blood pressures reviewed [id number], patient has been normotensive since 1732. PreE labs were within normal limits, P/C 1.53. Patient was seen and re-evaluated, has not complaints or concerns at this time including s/s PreE. At this time patient does have proteinuria but does not meet criteria for hypertensive disorder of pregnancy. Patient reports she has been feeling fetal movement since she arrived. BPP completed and 8/8. FHT reviewed in entirety and Category I. Fetal status is reassuring. Patient may be discharged home. Labor precautions, bleeding precautions, adequate PO hydration, fetal kick counts reviewed with the patient. All questions answered and concerns addressed. Patient's next OB appt is scheduled for 7/22/22, however encouraged patient to call the office tomorrow morning to be seen earlier in the week for close follow up. Patient verbalized understanding. Attending and senior resident updated and in agreement with plan.     Oracio Mora DO  OB/GYN Resident, PGY2  [address] \[facility] [place] [facility]\""  7/17/2022, 7:23 PM

## 2022-07-17 NOTE — PROGRESS NOTES
Patient presents to L&D to room 702 stating she has not felt fetal movement much today. She denies contractions, leaking of fluid, vaginal bleeding. She presents with an elevated blood pressure. She denies symptoms of pre-e. EFM applied, FHR 140s.

## 2022-07-17 NOTE — FLOWSHEET NOTE
Patient was given discharge instructions and verbalized understanding. Patient denied having any questions at this time.

## 2022-07-19 ENCOUNTER — HOSPITAL ENCOUNTER (OUTPATIENT)
Age: 32
Discharge: HOME | End: 2022-07-19
Attending: OBSTETRICS & GYNECOLOGY | Admitting: OBSTETRICS & GYNECOLOGY
Payer: COMMERCIAL

## 2022-07-19 VITALS
SYSTOLIC BLOOD PRESSURE: 136 MMHG | DIASTOLIC BLOOD PRESSURE: 83 MMHG | RESPIRATION RATE: 18 BRPM | OXYGEN SATURATION: 97 % | TEMPERATURE: 97.9 F | HEART RATE: 79 BPM

## 2022-07-19 PROBLEM — Z3A.38 38 WEEKS GESTATION OF PREGNANCY: Status: RESOLVED | Noted: 2022-07-17 | Resolved: 2022-07-19

## 2022-07-19 PROBLEM — Z3A.39 39 WEEKS GESTATION OF PREGNANCY: Status: ACTIVE | Noted: 2022-07-19

## 2022-07-19 LAB
ALBUMIN SERPL-MCNC: 3 G/DL (ref 3.5–5.2)
ALBUMIN/GLOBULIN RATIO: 1 (ref 1–2.5)
ALP BLD-CCNC: 130 U/L (ref 35–104)
ALT SERPL-CCNC: 8 U/L (ref 5–33)
ANION GAP SERPL CALCULATED.3IONS-SCNC: 11 MMOL/L (ref 9–17)
AST SERPL-CCNC: 16 U/L
BILIRUB SERPL-MCNC: 0.17 MG/DL (ref 0.3–1.2)
BUN BLDV-MCNC: 7 MG/DL (ref 6–20)
CALCIUM SERPL-MCNC: 8.9 MG/DL (ref 8.6–10.4)
CHLORIDE BLD-SCNC: 105 MMOL/L (ref 98–107)
CO2: 22 MMOL/L (ref 20–31)
CREAT SERPL-MCNC: 0.5 MG/DL (ref 0.5–0.9)
GFR AFRICAN AMERICAN: >60 ML/MIN
GFR NON-AFRICAN AMERICAN: >60 ML/MIN
GFR SERPL CREATININE-BSD FRML MDRD: ABNORMAL ML/MIN/{1.73_M2}
GLUCOSE BLD-MCNC: 84 MG/DL (ref 70–99)
HCT VFR BLD CALC: 35.3 % (ref 36.3–47.1)
HEMOGLOBIN: 11.1 G/DL (ref 11.9–15.1)
MCH RBC QN AUTO: 26.4 PG (ref 25.2–33.5)
MCHC RBC AUTO-ENTMCNC: 31.4 G/DL (ref 28.4–34.8)
MCV RBC AUTO: 84 FL (ref 82.6–102.9)
NRBC AUTOMATED: 0 PER 100 WBC
PDW BLD-RTO: 14.1 % (ref 11.8–14.4)
PLATELET # BLD: 341 K/UL (ref 138–453)
PMV BLD AUTO: 10.5 FL (ref 8.1–13.5)
POTASSIUM SERPL-SCNC: 4.2 MMOL/L (ref 3.7–5.3)
RBC # BLD: 4.2 M/UL (ref 3.95–5.11)
SODIUM BLD-SCNC: 138 MMOL/L (ref 135–144)
TOTAL PROTEIN: 6 G/DL (ref 6.4–8.3)
WBC # BLD: 7.8 K/UL (ref 3.5–11.3)

## 2022-07-19 PROCEDURE — 85027 COMPLETE CBC AUTOMATED: CPT

## 2022-07-19 PROCEDURE — 6370000000 HC RX 637 (ALT 250 FOR IP): Performed by: STUDENT IN AN ORGANIZED HEALTH CARE EDUCATION/TRAINING PROGRAM

## 2022-07-19 PROCEDURE — 80053 COMPREHEN METABOLIC PANEL: CPT

## 2022-07-19 PROCEDURE — 36415 COLL VENOUS BLD VENIPUNCTURE: CPT

## 2022-07-19 PROCEDURE — 99213 OFFICE O/P EST LOW 20 MIN: CPT

## 2022-07-19 RX ORDER — LANOLIN ALCOHOL/MO/W.PET/CERES
400 CREAM (GRAM) TOPICAL ONCE
Status: COMPLETED | OUTPATIENT
Start: 2022-07-19 | End: 2022-07-19

## 2022-07-19 RX ORDER — MECLIZINE HCL 12.5 MG/1
25 TABLET ORAL ONCE
Status: COMPLETED | OUTPATIENT
Start: 2022-07-19 | End: 2022-07-19

## 2022-07-19 RX ORDER — ONDANSETRON 4 MG/1
4 TABLET, ORALLY DISINTEGRATING ORAL EVERY 8 HOURS PRN
Status: DISCONTINUED | OUTPATIENT
Start: 2022-07-19 | End: 2022-07-19 | Stop reason: HOSPADM

## 2022-07-19 RX ORDER — ACETAMINOPHEN 500 MG
1000 TABLET ORAL EVERY 6 HOURS PRN
Status: DISCONTINUED | OUTPATIENT
Start: 2022-07-19 | End: 2022-07-19 | Stop reason: HOSPADM

## 2022-07-19 RX ORDER — ONDANSETRON 2 MG/ML
4 INJECTION INTRAMUSCULAR; INTRAVENOUS EVERY 6 HOURS PRN
Status: DISCONTINUED | OUTPATIENT
Start: 2022-07-19 | End: 2022-07-19 | Stop reason: HOSPADM

## 2022-07-19 RX ADMIN — MECLIZINE 25 MG: 12.5 TABLET ORAL at 15:57

## 2022-07-19 RX ADMIN — MAGNESIUM GLUCONATE 500 MG ORAL TABLET 400 MG: 500 TABLET ORAL at 15:57

## 2022-07-19 NOTE — H&P
dizziness, negative hearing loss  Breast: Negative breast abnormalities, negative breast lumps, negative nipple discharge  Respiratory: negative dyspnea, negative cough, negative SOB  Cardiovascular: negative chest pain,  negative palpitations, negative arrhythmia, negative syncope   Gastrointestinal: negative abdominal pain/contractions, negative RUQ pain, negative N/V, negative diarrhea, negative constipation, negative bowel changes, negative heartburn   Genitourinary: negative dysuria, negative hematuria, negative urinary incontinence, negative vaginal discharge, negative vaginal bleeding or spotting  Dermatological: negative rash, negative pruritis, negative mole or other skin changes  Hematologic: negative bruising  Immunologic/Lymphatic: negative recent illness, negative recent sick contact  Musculoskeletal: negative back pain, negative myalgias, negative arthralgias  Neurological:  negative dizziness, negative migraines, negative seizures, negative weakness  Behavior/Psych: negative depression, negative anxiety, negative SI, negative HI    OBSTETRICAL HISTORY:   OB History    Para Term  AB Living   1 0 0 0 0 0   SAB IAB Ectopic Molar Multiple Live Births   0 0 0 0 0 0      # Outcome Date GA Lbr Osiel/2nd Weight Sex Delivery Anes PTL Lv   1 Current                PAST MEDICAL HISTORY:   has a past medical history of Migraines and PCOS (polycystic ovarian syndrome). PAST SURGICAL HISTORY:   has a past surgical history that includes Tonsillectomy (2013) and [place] tooth extraction (2011). ALLERGIES:  has No Known Allergies. MEDICATIONS:  Prior to Admission medications    Medication Sig Start Date End Date Taking?  Authorizing Provider   levothyroxine (SYNTHROID) 88 MCG tablet TAKE 1 TABLET BY MOUTH IN THE MORNING IMMEDIATELY UPON ARISING, DELAY EATING/DRINKING FOR 30 MINUTES 22   Andreina Mccray DO   Prenatal Vit-Fe Fumarate-FA (PRENATAL VITAMINS PO) Take 1 tablet by mouth daily 3/7/18   Historical Provider, MD   aspirin 81 MG chewable tablet Take 81 mg by mouth daily 1/28/22   Historical Provider, MD   metFORMIN (GLUCOPHAGE-XR) 500 MG extended release tablet TAKE 4 TABLETS BY MOUTH EVERY DAY WITH BREAKFAST 1/6/21   DAVIAN Martinez CNP       FAMILY HISTORY:  family history is not on file. SOCIAL HISTORY:   reports that she has never smoked. She has never used smokeless tobacco. She reports that she does not currently use alcohol. She reports that she does not currently use drugs.     VITALS:  Vitals:    07/19/22 1350   BP: 139/84   Pulse: 83   Resp: 18   Temp: 97.9 °F (36.6 °C)   TempSrc: Oral   SpO2: 97%       PHYSICAL EXAM:  Fetal Heart Monitor:  Baseline Heart Rate 130, moderate variability, present accelerations, absent decelerations  [place]: contractions, none    General appearance:  no apparent distres, alert and cooperative  HEENT: head atraumatic, normocephalic, moist mucous membranes, trachea midline  Neurologic:  alert, oriented, normal speech, no focal findings or movement disorder noted  Lungs:  No increased work of breathing, good air exchange, clear to auscultation bilaterally, no crackles or wheezing  Heart:  regular rate and rhythm and no murmur, rubs, gallops  Abdomen:  soft, gravid, non-tender, no rebound, guarding, or rigidity, no RUQ or epigastric tenderness, no signs or symptoms of abruption, no signs or symptoms of chorioamnionitis, obese  Extremities:  no calf tenderness, non edematous, no varicosities, full range of motion in all four extremities, DTR's: +2/4 bilateral 2 extremities   Musculoskeletal: Gross strength equal and intact throughout, no gross abnormalities, range of motion normal in hips, knees, shoulders and spine, CVA tenderness: none  Psychiatric: Mood appropriate, normal affect   Rectal Exam: not indicated  Pelvic Exam: not indicated        PRENATAL LAB RESULTS:   Blood Type/Rh: O pos  Antibody Screen: negative  Hemoglobin, Hematocrit, Platelets: 28.5/45.8/965  Rubella: immune  T. Pallidum, IgG: non-reactive  Hepatitis B Surface Antigen: non-reactive   Hepatitis C Antibody: non-reactive   HIV: non-reactive   Gonorrhea: negative  Chlamydia: negative  Urine culture: negative, date: 21, positive GBS 10-50,000 CFU 22    Early 1 hour Glucose Tolerance Test: 133  1 hour Glucose Tolerance Test:  142  3 hour Glucose Tolerance Test: Fasting 79/ 1hr 172/ 2 hr 114/ 3 hr 138    Group B Strep: bacteriuria on 22  Cystic Fibrosis Screen: not available  Sickle Cell Screen: not available  First Trimester Screen: not available  QUAD: low risk for aneuploidy  MSAFP: not available  Non-Invasive Prenatal Testing: not available  Anatomy US: anterior placenta, 3VC normal insertion, female gender, normal anatomy    ASSESSMENT & PLAN:  Royce Comlenares is a 29 y.o. female  at 39w1d IUP   - GBS bacteriuria / Rh positive / R immune   - Pen G for GBS prophylaxis    Headache/Blurry Vision/Elevated BP at home   - VSS, BP normotensive during admission   - cEFM and TOCO showing Category 1 FHT and no contractions   - PreE labs wnl, P/C 1.53 ()   - CBC and CMP wnl   - Mag oxide and meclizine for symptomatic relief    - Patient reports significant improvement in headache and resolution of dizziness following medications   - Patient denies any s/s of PreE   - Lungs clear to auscultation bilaterally and BL LE DTRs 2/4   - Patient offered admission for risk reducing induction of labor given elevated BPs and proteinuria not meeting criteria for hypertensive disorder at term. Patient declines. She wishes to discuss induction options at her prenatal appt on Friday    - Given return precautions for contractions, vaginal bleeding, leakage of fluid or decreased fetal movement, PreE symptoms.  All questions answered and patient voiced understanding.    - Next prenatal appt  with Dr. Eloisa Fink    Elevated BPs   - 2 elevated non severe on  in triage that were not 4 hours apart so did not meet criteria for hypertensive disorder     Proteinuria   - Noted on 7/17 with P/C 1.53   - Patient had two elevated BP but did not meet criteria for hypertensive disorder    Hypothyroidism   - Most recent TSH 0.43 (4/22)   - Well controlled on synthroid 88mcg daily    Abnormal GTT   - Elevated 1hr GTT, 3 hr wnl    Anxiety   - Mood stable without medications   - Denies SI/HI    BMI 49    Patient Active Problem List    Diagnosis Date Noted    39 weeks gestation of pregnancy 07/19/2022     Priority: Medium    Need for diphtheria-tetanus-pertussis (Tdap) vaccine 04/22/2022     Priority: Medium    COVID-19 vaccine administered 04/22/2022     Priority: Medium    Hypothyroid in pregnancy, antepartum 04/21/2022     Priority: Medium     needs TSH q 4 weeks; o Synthroid 88 mg  TSH 0.43 on 1/6/22  TSH 0.68 2/10/22   4/22/22 TSH      0.63         T4          Elv BP x2 07/17/2022     During triage on 7/17/22; two elevated BP less than four hours apart; patient asymptomatic; PreE labs wnl, P/C 1.53      Proteinuria 07/17/2022     P/C 1.53 on 7/17/22      High-risk pregnancy 04/21/2022     Imitrex but has not taken since she found out she was pregnant  Stopped taking her Actos once she found out she was pregnant      Obesity in pregnancy 04/21/2022     Pre pregnant BMI- 45.67  Growth US  Asa @13 weeks  Early GTT- 133      Hypothyroid 03/04/2022    Elv Early 1 hr GTT, Needs Early 3 hr ordered 03/04/2022     Early 1 hr GTT: 133  Early 3 hr GTT: **      Anxiety 05/02/2017     No meds      PCOS      Was previously on metformin      GERD 07/14/2011       Plan discussed with Dr. Sandy Miller, who is agreeable. Steroids given this admission: No    Risks, benefits, alternatives and possible complications have been discussed in detail with the patient. Admission, and post admission procedures and expectations were discussed in detail. All questions were answered.     Attending's Name: Dr. Pastor Carey, DO  Ob/Gyn Resident  7/19/2022, 1:45 PM

## 2022-07-19 NOTE — FLOWSHEET NOTE
DC teaching completed with pt, verb understanding et compliance.  Denies any additional needs @ this time

## 2022-07-19 NOTE — FLOWSHEET NOTE
Patient arrived to unit complaining of headache with disturbed vision and dizziness. Vitals obtained bp wnl. Placed on fetal monitor. Will continue to monitor.

## 2022-07-22 ENCOUNTER — APPOINTMENT (OUTPATIENT)
Dept: LABOR AND DELIVERY | Age: 32
End: 2022-07-22
Payer: COMMERCIAL

## 2022-07-22 ENCOUNTER — ROUTINE PRENATAL (OUTPATIENT)
Dept: OBGYN CLINIC | Age: 32
End: 2022-07-22

## 2022-07-22 ENCOUNTER — HOSPITAL ENCOUNTER (INPATIENT)
Age: 32
LOS: 4 days | Discharge: HOME | End: 2022-07-26
Attending: OBSTETRICS & GYNECOLOGY | Admitting: OBSTETRICS & GYNECOLOGY
Payer: COMMERCIAL

## 2022-07-22 VITALS — WEIGHT: 293 LBS | DIASTOLIC BLOOD PRESSURE: 80 MMHG | BODY MASS INDEX: 49.87 KG/M2 | SYSTOLIC BLOOD PRESSURE: 122 MMHG

## 2022-07-22 DIAGNOSIS — Z3A.39 39 WEEKS GESTATION OF PREGNANCY: Primary | ICD-10-CM

## 2022-07-22 DIAGNOSIS — Z34.93 NORMAL PREGNANCY IN THIRD TRIMESTER: ICD-10-CM

## 2022-07-22 PROBLEM — O09.90 HIGH-RISK PREGNANCY, UNSPECIFIED TRIMESTER: Status: ACTIVE | Noted: 2022-07-22

## 2022-07-22 PROBLEM — O14.90 PREECLAMPSIA: Status: ACTIVE | Noted: 2022-07-22

## 2022-07-22 LAB
ABO/RH: NORMAL
ABSOLUTE EOS #: 0.03 K/UL (ref 0–0.44)
ABSOLUTE IMMATURE GRANULOCYTE: <0.03 K/UL (ref 0–0.3)
ABSOLUTE LYMPH #: 2.24 K/UL (ref 1.1–3.7)
ABSOLUTE MONO #: 0.61 K/UL (ref 0.1–1.2)
ALBUMIN SERPL-MCNC: 3.2 G/DL (ref 3.5–5.2)
ALBUMIN/GLOBULIN RATIO: 1 (ref 1–2.5)
ALP BLD-CCNC: 133 U/L (ref 35–104)
ALT SERPL-CCNC: <5 U/L (ref 5–33)
AMPHETAMINE SCREEN URINE: NEGATIVE
ANION GAP SERPL CALCULATED.3IONS-SCNC: 13 MMOL/L (ref 9–17)
ANTIBODY SCREEN: NEGATIVE
ARM BAND NUMBER: NORMAL
AST SERPL-CCNC: 16 U/L
BARBITURATE SCREEN URINE: NEGATIVE
BASOPHILS # BLD: 0 % (ref 0–2)
BASOPHILS ABSOLUTE: 0.03 K/UL (ref 0–0.2)
BENZODIAZEPINE SCREEN, URINE: NEGATIVE
BILIRUB SERPL-MCNC: <0.1 MG/DL (ref 0.3–1.2)
BUN BLDV-MCNC: 10 MG/DL (ref 6–20)
CALCIUM SERPL-MCNC: 9.3 MG/DL (ref 8.6–10.4)
CANNABINOID SCREEN URINE: NEGATIVE
CHLORIDE BLD-SCNC: 103 MMOL/L (ref 98–107)
CO2: 18 MMOL/L (ref 20–31)
COCAINE METABOLITE, URINE: NEGATIVE
CREAT SERPL-MCNC: 0.62 MG/DL (ref 0.5–0.9)
EOSINOPHILS RELATIVE PERCENT: 0 % (ref 1–4)
EXPIRATION DATE: NORMAL
FENTANYL URINE: NEGATIVE
GFR AFRICAN AMERICAN: >60 ML/MIN
GFR NON-AFRICAN AMERICAN: >60 ML/MIN
GFR SERPL CREATININE-BSD FRML MDRD: ABNORMAL ML/MIN/{1.73_M2}
GLUCOSE BLD-MCNC: 109 MG/DL (ref 70–99)
HCT VFR BLD CALC: 35.5 % (ref 36.3–47.1)
HEMOGLOBIN: 11.4 G/DL (ref 11.9–15.1)
IMMATURE GRANULOCYTES: 0 %
LYMPHOCYTES # BLD: 24 % (ref 24–43)
MCH RBC QN AUTO: 26.3 PG (ref 25.2–33.5)
MCHC RBC AUTO-ENTMCNC: 32.1 G/DL (ref 28.4–34.8)
MCV RBC AUTO: 81.8 FL (ref 82.6–102.9)
METHADONE SCREEN, URINE: NEGATIVE
MONOCYTES # BLD: 7 % (ref 3–12)
NRBC AUTOMATED: 0 PER 100 WBC
OPIATES, URINE: NEGATIVE
OXYCODONE SCREEN URINE: NEGATIVE
PDW BLD-RTO: 14 % (ref 11.8–14.4)
PHENCYCLIDINE, URINE: NEGATIVE
PLATELET # BLD: 346 K/UL (ref 138–453)
PMV BLD AUTO: 10.3 FL (ref 8.1–13.5)
POTASSIUM SERPL-SCNC: 3.8 MMOL/L (ref 3.7–5.3)
RBC # BLD: 4.34 M/UL (ref 3.95–5.11)
RBC # BLD: ABNORMAL 10*6/UL
SEG NEUTROPHILS: 69 % (ref 36–65)
SEGMENTED NEUTROPHILS ABSOLUTE COUNT: 6.52 K/UL (ref 1.5–8.1)
SODIUM BLD-SCNC: 134 MMOL/L (ref 135–144)
T. PALLIDUM, IGG: NONREACTIVE
TEST INFORMATION: NORMAL
TOTAL PROTEIN: 6.4 G/DL (ref 6.4–8.3)
WBC # BLD: 9.5 K/UL (ref 3.5–11.3)

## 2022-07-22 PROCEDURE — 86901 BLOOD TYPING SEROLOGIC RH(D): CPT

## 2022-07-22 PROCEDURE — 6370000000 HC RX 637 (ALT 250 FOR IP)

## 2022-07-22 PROCEDURE — 80053 COMPREHEN METABOLIC PANEL: CPT

## 2022-07-22 PROCEDURE — 6360000002 HC RX W HCPCS

## 2022-07-22 PROCEDURE — 85025 COMPLETE CBC W/AUTO DIFF WBC: CPT

## 2022-07-22 PROCEDURE — 86900 BLOOD TYPING SEROLOGIC ABO: CPT

## 2022-07-22 PROCEDURE — 1220000000 HC SEMI PRIVATE OB R&B

## 2022-07-22 PROCEDURE — 80307 DRUG TEST PRSMV CHEM ANLYZR: CPT

## 2022-07-22 PROCEDURE — 0502F SUBSEQUENT PRENATAL CARE: CPT | Performed by: OBSTETRICS & GYNECOLOGY

## 2022-07-22 PROCEDURE — 6360000002 HC RX W HCPCS: Performed by: STUDENT IN AN ORGANIZED HEALTH CARE EDUCATION/TRAINING PROGRAM

## 2022-07-22 PROCEDURE — 86780 TREPONEMA PALLIDUM: CPT

## 2022-07-22 PROCEDURE — 86850 RBC ANTIBODY SCREEN: CPT

## 2022-07-22 PROCEDURE — 2580000003 HC RX 258: Performed by: STUDENT IN AN ORGANIZED HEALTH CARE EDUCATION/TRAINING PROGRAM

## 2022-07-22 PROCEDURE — 84156 ASSAY OF PROTEIN URINE: CPT

## 2022-07-22 PROCEDURE — 82570 ASSAY OF URINE CREATININE: CPT

## 2022-07-22 RX ORDER — LEVOTHYROXINE SODIUM 88 UG/1
88 TABLET ORAL DAILY
Status: DISCONTINUED | OUTPATIENT
Start: 2022-07-23 | End: 2022-07-26 | Stop reason: HOSPADM

## 2022-07-22 RX ORDER — CARBOPROST TROMETHAMINE 250 UG/ML
250 INJECTION, SOLUTION INTRAMUSCULAR PRN
Status: DISCONTINUED | OUTPATIENT
Start: 2022-07-22 | End: 2022-07-24

## 2022-07-22 RX ORDER — SODIUM CHLORIDE, SODIUM LACTATE, POTASSIUM CHLORIDE, CALCIUM CHLORIDE 600; 310; 30; 20 MG/100ML; MG/100ML; MG/100ML; MG/100ML
INJECTION, SOLUTION INTRAVENOUS CONTINUOUS
Status: DISCONTINUED | OUTPATIENT
Start: 2022-07-22 | End: 2022-07-26 | Stop reason: HOSPADM

## 2022-07-22 RX ORDER — SODIUM CHLORIDE 0.9 % (FLUSH) 0.9 %
5-40 SYRINGE (ML) INJECTION EVERY 12 HOURS SCHEDULED
Status: DISCONTINUED | OUTPATIENT
Start: 2022-07-22 | End: 2022-07-24

## 2022-07-22 RX ORDER — ACETAMINOPHEN 500 MG
1000 TABLET ORAL EVERY 6 HOURS PRN
Status: DISCONTINUED | OUTPATIENT
Start: 2022-07-22 | End: 2022-07-24

## 2022-07-22 RX ORDER — SODIUM CHLORIDE 9 MG/ML
25 INJECTION, SOLUTION INTRAVENOUS PRN
Status: DISCONTINUED | OUTPATIENT
Start: 2022-07-22 | End: 2022-07-24

## 2022-07-22 RX ORDER — LIDOCAINE HYDROCHLORIDE 10 MG/ML
30 INJECTION, SOLUTION EPIDURAL; INFILTRATION; INTRACAUDAL; PERINEURAL PRN
Status: DISCONTINUED | OUTPATIENT
Start: 2022-07-22 | End: 2022-07-24

## 2022-07-22 RX ORDER — SODIUM CHLORIDE, SODIUM LACTATE, POTASSIUM CHLORIDE, AND CALCIUM CHLORIDE .6; .31; .03; .02 G/100ML; G/100ML; G/100ML; G/100ML
500 INJECTION, SOLUTION INTRAVENOUS PRN
Status: DISCONTINUED | OUTPATIENT
Start: 2022-07-22 | End: 2022-07-24

## 2022-07-22 RX ORDER — ONDANSETRON 2 MG/ML
4 INJECTION INTRAMUSCULAR; INTRAVENOUS EVERY 6 HOURS PRN
Status: DISCONTINUED | OUTPATIENT
Start: 2022-07-22 | End: 2022-07-23 | Stop reason: SDUPTHER

## 2022-07-22 RX ORDER — SODIUM CHLORIDE, SODIUM LACTATE, POTASSIUM CHLORIDE, AND CALCIUM CHLORIDE .6; .31; .03; .02 G/100ML; G/100ML; G/100ML; G/100ML
1000 INJECTION, SOLUTION INTRAVENOUS PRN
Status: DISCONTINUED | OUTPATIENT
Start: 2022-07-22 | End: 2022-07-24

## 2022-07-22 RX ORDER — METHYLERGONOVINE MALEATE 0.2 MG/ML
200 INJECTION INTRAVENOUS PRN
Status: DISCONTINUED | OUTPATIENT
Start: 2022-07-22 | End: 2022-07-24

## 2022-07-22 RX ORDER — SODIUM CHLORIDE 0.9 % (FLUSH) 0.9 %
5-40 SYRINGE (ML) INJECTION PRN
Status: DISCONTINUED | OUTPATIENT
Start: 2022-07-22 | End: 2022-07-26 | Stop reason: HOSPADM

## 2022-07-22 RX ORDER — MISOPROSTOL 100 UG/1
800 TABLET ORAL PRN
Status: DISCONTINUED | OUTPATIENT
Start: 2022-07-22 | End: 2022-07-24

## 2022-07-22 RX ORDER — DIPHENHYDRAMINE HCL 25 MG
25 TABLET ORAL EVERY 4 HOURS PRN
Status: DISCONTINUED | OUTPATIENT
Start: 2022-07-22 | End: 2022-07-26 | Stop reason: HOSPADM

## 2022-07-22 RX ORDER — TRANEXAMIC ACID 10 MG/ML
1000 INJECTION, SOLUTION INTRAVENOUS
Status: ACTIVE | OUTPATIENT
Start: 2022-07-22 | End: 2022-07-22

## 2022-07-22 RX ADMIN — DEXTROSE MONOHYDRATE 5 MILLION UNITS: 5 INJECTION INTRAVENOUS at 20:11

## 2022-07-22 RX ADMIN — Medication 25 MCG: at 21:25

## 2022-07-22 RX ADMIN — SODIUM CHLORIDE, POTASSIUM CHLORIDE, SODIUM LACTATE AND CALCIUM CHLORIDE: 600; 310; 30; 20 INJECTION, SOLUTION INTRAVENOUS at 19:48

## 2022-07-22 NOTE — PROGRESS NOTES
Bailey Dillon is a 29 y.o. female 39w4d        OB History    Para Term  AB Living   1 0 0 0 0 0   SAB IAB Ectopic Molar Multiple Live Births   0 0 0   0        # Outcome Date GA Lbr Osiel/2nd Weight Sex Delivery Anes PTL Lv   1 Current                Vitals  BP: 122/80  Weight: (!) 309 lb (140.2 kg)  Patient Position: Sitting  Albumin: 1+  Glucose: Negative      The patient was seen and evaluated. There was positive fetal movements. No contractions or leakage of fluid. Signs and symptoms of labor were reviewed. The S/S of Pre-Eclampsia were reviewed with the patient in detail. She is to report any of these if they occur. She currently admits to persistent headache. Her BP have been consistently elevated for her but not >140/90. Denies vision changes or RUQ pain. Discussed options including R/B/A of each. Concern for developing preeclampsia vs. Inducing unfavorable cervix. The patient was instructed on fetal kick counts and was given a kick sheet to complete every 8 hours. She was instructed that the baby should move at a minimum of ten times within one hour after a meal. The patient was instructed to lay down on her left side twenty minutes after eating and count movements for up to one hour with a target value of ten movements. She was instructed to notify the office if she did not make that target after two attempts or if after any attempt there was less than four movements. The patient reports that the targets have been made Yes. Last Pap 2021      Estimated Date of Delivery: 22   CAPO By LMP/ TVUS:  Ultrasound Date  12/15/21      Delivery at : [facility]  Gender: girl  Partner:    PRENATAL LAB RESULTS:      Pre-pregnancy: BMI  kg/m²    Blood Type/Rh: O+  Antibody Screen: negative  Initial Hemoglobin, Hematocrit, Platelets: 22.7/ 43.8/ 398  Rubella: immune  T.  Pallidum, IgG: nonreactive  Hepatitis B Surface Antigen: nonreactive  Hepatitis C Antibody: nonreactive  HIV: nonreactive  Sickle Cell Screen:   Gonorrhea: neg  Chlamydia: neg  Urine culture:  negative date: 12/22/21  Initial urine drug screen:  negative  date: 12/22/21  Cystic Fibrosis Screen:   First Trimester Screen:  1/20/22 negative  MSAFP/Multiple Markers: not done  Non-Invasive Prenatal Testing: not done      Early 1 hour Glucose Tolerance Test: 133  Early 3 hour Glucose Tolerance Test:   1 hour Glucose Tolerance Test: 142 Abnormal  3 hour Glucose Tolerance Test: normal limits    Anatomy US: Placenta-  anterior  Vessel cord # -  3  Cord insertion: normal  Cervical length: 4.61cm  Anatomy: wnl    Group B Strep:  +GBS Urine Date: 4/2022             Tdap: 5/12/22 Tdap given  Flu shot:   COVID Shot:   Breast pump RX:  Medicaid/ WIC referral:           T-Dap Vaccine Completed (27-36 weeks): Yes    Allergies: Allergies as of 07/22/2022    (No Known Allergies)         Group Beta Strep collection was completed.  Yes  GBS Results:   Admission on 07/19/2022, Discharged on 07/19/2022   Component Date Value Ref Range Status    WBC 07/19/2022 7.8  3.5 - 11.3 k/uL Final    RBC 07/19/2022 4.20  3.95 - 5.11 m/uL Final    Hemoglobin 07/19/2022 11.1 (A) 11.9 - 15.1 g/dL Final    Hematocrit 07/19/2022 35.3 (A) 36.3 - 47.1 % Final    MCV 07/19/2022 84.0  82.6 - 102.9 fL Final    MCH 07/19/2022 26.4  25.2 - 33.5 pg Final    MCHC 07/19/2022 31.4  28.4 - 34.8 g/dL Final    RDW 07/19/2022 14.1  11.8 - 14.4 % Final    Platelets 44/62/2701 341  138 - 453 k/uL Final    MPV 07/19/2022 10.5  8.1 - 13.5 fL Final    NRBC Automated 07/19/2022 0.0  0.0 per 100 WBC Final    Glucose 07/19/2022 84  70 - 99 mg/dL Final    BUN 07/19/2022 7  6 - 20 mg/dL Final    Creatinine 07/19/2022 0.50  0.50 - 0.90 mg/dL Final    Calcium 07/19/2022 8.9  8.6 - 10.4 mg/dL Final    Sodium 07/19/2022 138  135 - 144 mmol/L Final    Potassium 07/19/2022 4.2  3.7 - 5.3 mmol/L Final    Chloride 07/19/2022 105  98 - 107 mmol/L Final    CO2 07/19/2022 22  20 - 31 mmol/L Final    Anion Gap 07/19/2022 11  9 - 17 mmol/L Final    Alkaline Phosphatase 07/19/2022 130 (A) 35 - 104 U/L Final    ALT 07/19/2022 8  5 - 33 U/L Final    AST 07/19/2022 16  <32 U/L Final    Total Bilirubin 07/19/2022 0.17 (A) 0.3 - 1.2 mg/dL Final    Total Protein 07/19/2022 6.0 (A) 6.4 - 8.3 g/dL Final    Albumin 07/19/2022 3.0 (A) 3.5 - 5.2 g/dL Final    Albumin/Globulin Ratio 07/19/2022 1.0  1.0 - 2.5 Final    GFR Non- 07/19/2022 >60  >60 mL/min Final    GFR  07/19/2022 >60  >60 mL/min Final    GFR Comment 07/19/2022        Final    Comment: Average GFR for 30-36 years old:   80 mL/min/1.73sq m  Chronic Kidney Disease:   <60 mL/min/1.73sq m  Kidney failure:   <15 mL/min/1.73sq m              eGFR calculated using average adult body mass.  Additional eGFR calculator available at:        [email or web address]           Admission on 07/17/2022, Discharged on 07/17/2022   Component Date Value Ref Range Status    WBC 07/17/2022 8.7  3.5 - 11.3 k/uL Final    RBC 07/17/2022 4.21  3.95 - 5.11 m/uL Final    Hemoglobin 07/17/2022 11.2 (A) 11.9 - 15.1 g/dL Final    Hematocrit 07/17/2022 35.4 (A) 36.3 - 47.1 % Final    MCV 07/17/2022 84.1  82.6 - 102.9 fL Final    MCH 07/17/2022 26.6  25.2 - 33.5 pg Final    MCHC 07/17/2022 31.6  28.4 - 34.8 g/dL Final    RDW 07/17/2022 13.8  11.8 - 14.4 % Final    Platelets 78/01/5525 333  138 - 453 k/uL Final    MPV 07/17/2022 10.2  8.1 - 13.5 fL Final    NRBC Automated 07/17/2022 0.0  0.0 per 100 WBC Final    Seg Neutrophils 07/17/2022 66 (A) 36 - 65 % Final    Lymphocytes 07/17/2022 24  24 - 43 % Final    Monocytes 07/17/2022 8  3 - 12 % Final    Eosinophils % 07/17/2022 1  1 - 4 % Final    Basophils 07/17/2022 1  0 - 2 % Final    Immature Granulocytes 07/17/2022 0  0 % Final    Segs Absolute 07/17/2022 5.82  1.50 - 8.10 k/uL Final    Absolute Lymph # 07/17/2022 2.05 1.10 - 3.70 k/uL Final    Absolute Mono # 07/17/2022 0.66  0.10 - 1.20 k/uL Final    Absolute Eos # 07/17/2022 0.05  0.00 - 0.44 k/uL Final    Basophils Absolute 07/17/2022 0.04  0.00 - 0.20 k/uL Final    Absolute Immature Granulocyte 07/17/2022 0.03  0.00 - 0.30 k/uL Final    Glucose 07/17/2022 82  70 - 99 mg/dL Final    BUN 07/17/2022 6  6 - 20 mg/dL Final    Creatinine 07/17/2022 0.49 (A) 0.50 - 0.90 mg/dL Final    Calcium 07/17/2022 8.6  8.6 - 10.4 mg/dL Final    Sodium 07/17/2022 136  135 - 144 mmol/L Final    Potassium 07/17/2022 3.8  3.7 - 5.3 mmol/L Final    Chloride 07/17/2022 104  98 - 107 mmol/L Final    CO2 07/17/2022 19 (A) 20 - 31 mmol/L Final    Anion Gap 07/17/2022 13  9 - 17 mmol/L Final    Alkaline Phosphatase 07/17/2022 125 (A) 35 - 104 U/L Final    ALT 07/17/2022 7  5 - 33 U/L Final    AST 07/17/2022 13  <32 U/L Final    Total Bilirubin 07/17/2022 <0.10 (A) 0.3 - 1.2 mg/dL Final    Total Protein 07/17/2022 6.0 (A) 6.4 - 8.3 g/dL Final    Albumin 07/17/2022 2.7 (A) 3.5 - 5.2 g/dL Final    Albumin/Globulin Ratio 07/17/2022 0.8 (A) 1.0 - 2.5 Final    GFR Non- 07/17/2022 >60  >60 mL/min Final    GFR  07/17/2022 >60  >60 mL/min Final    GFR Comment 07/17/2022        Final    Comment: Average GFR for 30-36 years old:   80 mL/min/1.73sq m  Chronic Kidney Disease:   <60 mL/min/1.73sq m  Kidney failure:   <15 mL/min/1.73sq m              eGFR calculated using average adult body mass. Additional eGFR calculator available at:        [email or web address]            Total Protein, Urine 07/17/2022 67  mg/dL Final    No normal range established.     Creatinine, Ur 07/17/2022 43.9  28.0 - 217.0 mg/dL Final    Urine Total Protein Creatinine Rat* 07/17/2022 1.53 (A) 0.00 - 0.20 Final   ]        Cervical Exam was:   closed cm dilated, 50 effaced, -3 station, cervical position mid, consistency soft      The patient was counseled on the mandatory call ahead policy. She has been instructed to call the office at anytime prior to going into the hospital so the on-call physician may direct her to the appropriate facility for care. Exceptions were reviewed including but not limited to: Decreased fetal movement, vaginal Bleeding or hemorrhage, trauma, readily expectant delivery, or any instance where she feels 911 should be utilized. The patient was counseled on Labor & Delivery. Route of delivery and counseling on vaginal, operative vaginal, and  sections were completed with the risks of each to both the patient as well as her baby. The possibility of a blood transfusion was discussed as well. The patient was not opposed to receiving a transfusion if needed. The patient was counseled on types of analgesia during labor and is considering either Regional or IV medication the risks, benefits and alternatives were discussed.  Testing:  None                    Assessment:  1Salomon Butt is a 29 y.o. female  2.    3. 39w4d    Patient Active Problem List    Diagnosis Date Noted    39 weeks gestation of pregnancy 2022     Priority: Medium    Need for diphtheria-tetanus-pertussis (Tdap) vaccine 2022     Priority: Medium    COVID-19 vaccine administered 2022     Priority: Medium    Hypothyroid in pregnancy, antepartum 2022     Priority: Medium     Overview Note:     needs TSH q 4 weeks; o Synthroid 88 mg  TSH 0.43 on 22  TSH 0.68 2/10/22   4/22/22 TSH      0.63         T4          Elv BP x2 2022     Overview Note:     During triage on 22; two elevated BP less than four hours apart; patient asymptomatic; PreE labs wnl, P/C 1.53      Proteinuria 2022     Overview Note:     P/C 1.53 on 22      High-risk pregnancy 2022     Overview Note:     Imitrex but has not taken since she found out she was pregnant  Stopped taking her Actos once she found out she was pregnant      Obesity in pregnancy 04/21/2022     Overview Note:     Pre pregnant BMI- 45.67  Growth US  Asa @13 weeks  Early GTT- 133      Hypothyroid 03/04/2022    Elv Early 1 hr GTT, Needs Early 3 hr ordered 03/04/2022     Overview Note:     Early 1 hr GTT: 133  Early 3 hr GTT: **      Anxiety 05/02/2017     Overview Note:     No meds      PCOS      Overview Note:     Was previously on metformin      GERD 07/14/2011        Diagnosis Orders   1. 39 weeks gestation of pregnancy        2. Normal pregnancy in third trimester                Plan:  The patient will return to the office for postpartum visit in 2 weeks. See antepartum flow sheet. Induction of labor today for risk reduction for HA and slowly rising pressures. HA not responsive to tylenol or caffeine. Counseled on risk for prolonged induction, prolonged hospital stay, risk of C/S  Counseled on s/s of preeclampsia. Counseled on fetal movement      Patient was seen with total face to face time of 20 minutes. More than 50% of this visit was on counseling and education regarding her    Diagnosis Orders   1. 39 weeks gestation of pregnancy        2. Normal pregnancy in third trimester         and her options. She was also counseled on her preventative health maintenance recommendations and follow-up.

## 2022-07-23 ENCOUNTER — ANESTHESIA (OUTPATIENT)
Dept: LABOR AND DELIVERY | Age: 32
End: 2022-07-23
Payer: COMMERCIAL

## 2022-07-23 ENCOUNTER — ANESTHESIA EVENT (OUTPATIENT)
Dept: LABOR AND DELIVERY | Age: 32
End: 2022-07-23
Payer: COMMERCIAL

## 2022-07-23 LAB
CREATININE URINE: 118.2 MG/DL (ref 28–217)
TOTAL PROTEIN, URINE: 38 MG/DL
URINE TOTAL PROTEIN CREATININE RATIO: 0.32 (ref 0–0.2)

## 2022-07-23 PROCEDURE — 6360000002 HC RX W HCPCS: Performed by: STUDENT IN AN ORGANIZED HEALTH CARE EDUCATION/TRAINING PROGRAM

## 2022-07-23 PROCEDURE — 6360000002 HC RX W HCPCS: Performed by: SPECIALIST

## 2022-07-23 PROCEDURE — 1220000000 HC SEMI PRIVATE OB R&B

## 2022-07-23 PROCEDURE — 6360000002 HC RX W HCPCS

## 2022-07-23 PROCEDURE — 96372 THER/PROPH/DIAG INJ SC/IM: CPT

## 2022-07-23 PROCEDURE — 96375 TX/PRO/DX INJ NEW DRUG ADDON: CPT

## 2022-07-23 PROCEDURE — 96374 THER/PROPH/DIAG INJ IV PUSH: CPT

## 2022-07-23 PROCEDURE — 6370000000 HC RX 637 (ALT 250 FOR IP): Performed by: STUDENT IN AN ORGANIZED HEALTH CARE EDUCATION/TRAINING PROGRAM

## 2022-07-23 PROCEDURE — 2500000003 HC RX 250 WO HCPCS: Performed by: SPECIALIST

## 2022-07-23 PROCEDURE — 2580000003 HC RX 258: Performed by: STUDENT IN AN ORGANIZED HEALTH CARE EDUCATION/TRAINING PROGRAM

## 2022-07-23 PROCEDURE — 6360000002 HC RX W HCPCS: Performed by: ANESTHESIOLOGY

## 2022-07-23 RX ORDER — EPHEDRINE SULFATE 50 MG/ML
10 INJECTION INTRAVENOUS
Status: DISCONTINUED | OUTPATIENT
Start: 2022-07-23 | End: 2022-07-24

## 2022-07-23 RX ORDER — MORPHINE SULFATE 10 MG/ML
8 INJECTION, SOLUTION INTRAMUSCULAR; INTRAVENOUS ONCE
Status: COMPLETED | OUTPATIENT
Start: 2022-07-23 | End: 2022-07-23

## 2022-07-23 RX ORDER — PROCHLORPERAZINE EDISYLATE 5 MG/ML
10 INJECTION INTRAMUSCULAR; INTRAVENOUS ONCE
Status: COMPLETED | OUTPATIENT
Start: 2022-07-23 | End: 2022-07-23

## 2022-07-23 RX ORDER — NALOXONE HYDROCHLORIDE 0.4 MG/ML
INJECTION, SOLUTION INTRAMUSCULAR; INTRAVENOUS; SUBCUTANEOUS PRN
Status: DISCONTINUED | OUTPATIENT
Start: 2022-07-23 | End: 2022-07-24

## 2022-07-23 RX ORDER — MORPHINE SULFATE 2 MG/ML
2 INJECTION, SOLUTION INTRAMUSCULAR; INTRAVENOUS ONCE
Status: COMPLETED | OUTPATIENT
Start: 2022-07-23 | End: 2022-07-23

## 2022-07-23 RX ORDER — ONDANSETRON 2 MG/ML
4 INJECTION INTRAMUSCULAR; INTRAVENOUS EVERY 6 HOURS PRN
Status: DISCONTINUED | OUTPATIENT
Start: 2022-07-23 | End: 2022-07-24

## 2022-07-23 RX ORDER — DIPHENHYDRAMINE HYDROCHLORIDE 50 MG/ML
25 INJECTION INTRAMUSCULAR; INTRAVENOUS ONCE
Status: COMPLETED | OUTPATIENT
Start: 2022-07-23 | End: 2022-07-23

## 2022-07-23 RX ORDER — CALCIUM CARBONATE 200(500)MG
1000 TABLET,CHEWABLE ORAL 3 TIMES DAILY PRN
Status: DISCONTINUED | OUTPATIENT
Start: 2022-07-23 | End: 2022-07-26 | Stop reason: HOSPADM

## 2022-07-23 RX ORDER — NALBUPHINE HCL 10 MG/ML
5 AMPUL (ML) INJECTION EVERY 4 HOURS PRN
Status: DISCONTINUED | OUTPATIENT
Start: 2022-07-23 | End: 2022-07-26 | Stop reason: HOSPADM

## 2022-07-23 RX ORDER — MORPHINE SULFATE 10 MG/ML
10 INJECTION, SOLUTION INTRAMUSCULAR; INTRAVENOUS ONCE
Status: COMPLETED | OUTPATIENT
Start: 2022-07-23 | End: 2022-07-23

## 2022-07-23 RX ORDER — ROPIVACAINE HYDROCHLORIDE 2 MG/ML
INJECTION, SOLUTION EPIDURAL; INFILTRATION; PERINEURAL
Status: COMPLETED
Start: 2022-07-23 | End: 2022-07-24

## 2022-07-23 RX ADMIN — PROCHLORPERAZINE EDISYLATE 10 MG: 5 INJECTION INTRAMUSCULAR; INTRAVENOUS at 20:07

## 2022-07-23 RX ADMIN — MORPHINE SULFATE 10 MG: 10 INJECTION INTRAVENOUS at 20:07

## 2022-07-23 RX ADMIN — Medication 50 MCG: at 01:36

## 2022-07-23 RX ADMIN — ROPIVACAINE HYDROCHLORIDE 10 ML/HR: 2 INJECTION, SOLUTION EPIDURAL; INFILTRATION at 23:56

## 2022-07-23 RX ADMIN — LEVOTHYROXINE SODIUM 88 MCG: 88 TABLET ORAL at 08:13

## 2022-07-23 RX ADMIN — Medication 2.5 MILLION UNITS: at 00:07

## 2022-07-23 RX ADMIN — Medication 2.5 MILLION UNITS: at 13:15

## 2022-07-23 RX ADMIN — Medication 2.5 MILLION UNITS: at 04:01

## 2022-07-23 RX ADMIN — ROPIVACAINE HYDROCHLORIDE 10 ML: 2 INJECTION, SOLUTION EPIDURAL; INFILTRATION at 23:51

## 2022-07-23 RX ADMIN — MORPHINE SULFATE 2 MG: 2 INJECTION, SOLUTION INTRAMUSCULAR; INTRAVENOUS at 10:13

## 2022-07-23 RX ADMIN — SODIUM CHLORIDE, POTASSIUM CHLORIDE, SODIUM LACTATE AND CALCIUM CHLORIDE: 600; 310; 30; 20 INJECTION, SOLUTION INTRAVENOUS at 12:44

## 2022-07-23 RX ADMIN — ACETAMINOPHEN 1000 MG: 500 TABLET ORAL at 07:51

## 2022-07-23 RX ADMIN — Medication 2.5 MILLION UNITS: at 09:04

## 2022-07-23 RX ADMIN — PROCHLORPERAZINE EDISYLATE 10 MG: 5 INJECTION INTRAMUSCULAR; INTRAVENOUS at 10:02

## 2022-07-23 RX ADMIN — Medication 25 MCG: at 05:39

## 2022-07-23 RX ADMIN — Medication 2.5 MILLION UNITS: at 21:04

## 2022-07-23 RX ADMIN — LIDOCAINE HYDROCHLORIDE,EPINEPHRINE BITARTRATE 3 ML: 15; .005 INJECTION, SOLUTION EPIDURAL; INFILTRATION; INTRACAUDAL; PERINEURAL at 23:46

## 2022-07-23 RX ADMIN — DIPHENHYDRAMINE HYDROCHLORIDE 25 MG: 50 INJECTION, SOLUTION INTRAMUSCULAR; INTRAVENOUS at 01:36

## 2022-07-23 RX ADMIN — Medication 1 MILLI-UNITS/MIN: at 13:31

## 2022-07-23 RX ADMIN — Medication 2.5 MILLION UNITS: at 17:12

## 2022-07-23 RX ADMIN — MORPHINE SULFATE 8 MG: 10 INJECTION INTRAVENOUS at 10:02

## 2022-07-23 ASSESSMENT — PAIN DESCRIPTION - LOCATION
LOCATION: ABDOMEN;BACK
LOCATION: ABDOMEN

## 2022-07-23 ASSESSMENT — PAIN SCALES - GENERAL
PAINLEVEL_OUTOF10: 6
PAINLEVEL_OUTOF10: 5

## 2022-07-23 ASSESSMENT — PAIN DESCRIPTION - DESCRIPTORS
DESCRIPTORS: CRAMPING
DESCRIPTORS: ACHING;CRAMPING;SPASM

## 2022-07-23 ASSESSMENT — PAIN DESCRIPTION - ORIENTATION: ORIENTATION: MID

## 2022-07-23 NOTE — PROGRESS NOTES
Labor Progress Note    Finesse Carlton is a 29 y.o. female  at 38w11d  The patient was seen and examined. Her pain is well controlled. She reports fetal movement is present, denies contractions, complains of loss of fluid, denies vaginal bleeding. Patient was informed of risks/benefits of Cooks Balloon placement, and verbal consent was obtained. SVE was performed and Cooks Balloon was inserted and filled to 40 cc in both uterine and vaginal balloons due to patient tolerance. Plan to fill balloons with additional 40 cc over time.       Vital Signs:  Vitals:    22 0623 22 0810 22 1000 22 1009   BP: 122/70 (!) 144/74  (!) 140/84   Pulse: 68 66  77   Resp: 16 18     Temp: 98.1 °F (36.7 °C) 98.1 °F (36.7 °C) 98.4 °F (36.9 °C)    TempSrc: Oral Oral Oral    SpO2:             FHT: 140, moderate variability, accelerations present, decelerations variable  Contractions: difficult to trace, patient reports every 5 minutes    Chaperone for Intimate Exam: Chaperone was present for entire exam, Chaperone Name: Aryan Bello RN  Cervical Exam: 1 cm dilated, 50 effaced, -2 station  Pitocin: @ 0 mu/min    Membranes: Ruptured clear fluid  Scalp Electrode in place: absent  Intrauterine Pressure Catheter in Place: absent    Interventions: Cooks Balloon @ 1030      Assessment/Plan:  Finesse Carlton is a 29 y.o. female  at 38w11d admitted for IUP    - GBS positive, Pen G for GBS prophylaxis   - Rh +/RI   - Anterior/Female EFW 8#4    IOL 2/2 PROM    - VSS, afebrile    - CEFM Cat I, TOCO showing irregular contractions, difficult to trace   - SVE /-2   - S/p Morphine/Compazine x1   - S/p Cytotec 50 buccal x1, 25 x2   - Cooks Balloon @ 1030   - Low dose pitocin ordered per protocol    PreE w/o SF (newly dx)   - PreE labs wnl; P/C 0.32 ()   - BP intermittently elevated, no severe range pressures; VS otherwise stable  -  Denies s/s PreE at this time      Attending updated and in agreement with plan    Marcelle Bowman, DO  Ob/Gyn Resident  7/23/2022, 1:37 PM        Resident Physician Statement  I have personally seen the patient. I agree with the assessment, plan and orders as documented by leonid resident. I have made changes to the above note as needed. I have discussed the case with above named attending.      Gonzales Yarbrough, DO  OB/GYN PGY-3  7/23/2022  1:41 PM

## 2022-07-23 NOTE — PROGRESS NOTES
Labor Progress Note    Aminta Gallegos is a 29 y.o. female  at 38w11d  The patient was seen and examined. Her pain is well controlled without medication. She reports fetal movement is present, complains of contractions, complains of loss of fluid, denies vaginal bleeding. Vital Signs:  Vitals:    22 1336 22 1400 22 1501 22 1601   BP: 130/76 133/72 132/85 (!) 140/93   Pulse: 70 52 57 58   Resp:  18  18   Temp:  98.1 °F (36.7 °C)  98.1 °F (36.7 °C)   TempSrc:  Oral  Oral   SpO2:             FHT: Baseline 130, moderate variability, accelerations present, decelerations absent, Category 1 tracing  Contractions: regular, every 5 minutes  Cervical Exam: performed with Cooks balloon in place, approximately 3-4 cm dilated  Chaperone for Intimate Exam: Chaperone was present for entire exam, Chaperone Name: Herminia Lanes, RN  Pitocin: @ 5 mu/min    Membranes: Ruptured clear fluid  Scalp Electrode in place: absent  Intrauterine Pressure Catheter in Place: absent        Assessment/Plan:  Aminta Gallegos is a 29 y.o. female  at 38w11d admitted for IUP   - GBS positive, Pen G for GBS prophylaxis   - Rh pos, Workup for Rhogam not indicated PP   - Anterior placenta, 3 vc, normal female anatomy    IOL 2/2 PROM    - VSS, afebrile   - Cat 1 FHT and TOCO showing regular contractions   - Membranes PROM (clr) @ 1800 on 22   - SVE 3-4 with Cooks balloon in place    - cEFM and TOCO   - LR IVF @ 125 mL/hr   - S/p Cytotec 50mcg buccal x1, 25 mcg PO x2   - S/p morphine/compazine x1   - Cooks balloon in place   - Continue pitocin per low dose protocol     Discussed with Senior Resident.     Attending updated and in agreement with plan    Elmer Chiang DO  Ob/Gyn Resident  Pager: [phone] [facility]   :01 PM

## 2022-07-23 NOTE — H&P
OBSTETRICAL HISTORY [facility]    Date: 2022       Time: 9:31 PM   Patient Name: Aminta Gallegos     Patient : 1990  Room/Bed: [facility]/8728-22    Admission Date/Time: 2022  7:12 PM      CC: Leakage of fluid     HPI: Aminta Gallegos is a 29 y.o. Donneta Hardy at 39w4d who presents with leakage of fluid, patient had a large gush at 1800 and continues to leak fluid. Patient denies any fever, chills, N/V, headaches, vision changes, chest pain, shortness of breath, RUQ pain, abdominal pain, and increased swelling/tenderness in bilateral lower extremities. Patient denies any vaginal discharge and any urinary complaints. The patient reports fetal movement is present, complains of contractions, complains of loss of fluid, denies vaginal bleeding. DATING:  LMP: Patient's last menstrual period was 10/18/2021.   Estimated Date of Delivery: 22   Based on: LMP c/w US, at 8 2/7 weeks GA    PREGNANCY RISK FACTORS:  Patient Active Problem List   Diagnosis    PCOS    Anxiety    GERD    Hypothyroid    Elv Early 1 hr GTT, Needs Early 3 hr ordered    Hypothyroid in pregnancy, antepartum    High-risk pregnancy    Obesity in pregnancy    Need for diphtheria-tetanus-pertussis (Tdap) vaccine    COVID-19 vaccine administered    Elv BP x2    Proteinuria    39 weeks gestation of pregnancy    High-risk pregnancy, unspecified trimester        Steroids Given In This Pregnancy:  no     REVIEW OF SYSTEMS:  Constitutional: negative fever, negative chills  HEENT: negative visual disturbances, negative headaches  Respiratory: negative dyspnea, negative cough  Cardiovascular: negative chest pain,  negative palpitations  Gastrointestinal: positive abdominal pain secondary to contractions, negative RUQ pain, negative N/V, negative diarrhea, negative constipation  Genitourinary: negative dysuria, negative vaginal discharge, negative vaginal bleeding, positive leakage of fluid  Dermatological: Heart Rate 150, moderate variability, present accelerations, absent decelerations  [place]: rare contractions    General appearance:  no apparent distress, alert, and cooperative  HEENT: head atraumatic, normocephalic, moist mucous membranes, trachea midline  Neurologic:  alert, oriented, normal speech, no focal findings or movement disorder noted  Lungs:  No increased work of breathing, good air exchange  Heart:  regular rate, without cyanosis  Abdomen:  soft, gravid, and non-tender, no signs of placenta abruption or chorioamnionitis  Extremities:  no calf tenderness, not edematous  Musculoskeletal: Gross strength equal and intact throughout, no gross abnormalities, range of motion normal in hips, knees, shoulders and spine  Psychiatric: Mood appropriate, normal affect   Rectal Exam: not indicated  Pelvic Exam:  Chaperone for Intimate Exam: Chaperone was present for entire exam, Chaperone Name: Robb Knox    Patient noted to be grossly ruptured with pooling of amniotic fluid on the bed, Nitrazine positive    Sterile Vaginal Exam:  Cervix: No cervical motion tenderness   Uterus: Is gravid, Normal size, shape, consistency and non-tender  Cervix: Closed dilated, 50 % effaced, -3 station, posterior position (out of 3 station), firm consistency, FETAL POSITION: Cephalic confirmed by ultrasound, Membranes ruptured, clear fluid,      DATA:  Membranes Ruptured: Yes: grossly ruptured  Nitrazine: Positive      LIMITED BEDSIDE US:  Position: Cephalic  Placental Location: anterior  Fetal Heart Tones: Present  Fetal Movement: Present  Amniotic Fluid Index/Volume: >2x2 cm mvp  Estimated Fetal Weight:  8 lbs 4oz    PRENATAL LAB RESULTS:  Blood Type/Rh: O pos  Antibody Screen: negative  Hemoglobin, Hematocrit, Platelets: 53.8/91.6/777  Rubella: immune  T.  Pallidum, IgG: non-reactive  Hepatitis B Surface Antigen: non-reactive   Hepatitis C Antibody: non-reactive   HIV: non-reactive   Gonorrhea: negative  Chlamydia: negative  Urine culture: Negative, date: 22    Positive, date: 22, GBS    Early 1 hour Glucose Tolerance Test: 133  1 hour Glucose Tolerance Test:  142  3 hour Glucose Tolerance Test: Fasting 79/1 hour 172/2 hour 114/3 hour 138    Group B Strep: positive bacteruria on 22  Cystic Fibrosis Screen: not done  Sickle Cell Screen: not done  First Trimester Screen: normal  msAFP: not completed  Non-Invasive Prenatal Testing: not completed  Anatomy US: anterior placenta, 3VC, female gender, normal anatomy    ASSESSMENT & PLAN:  Radha Armando is a 29 y.o. female  at 43w3d    - GBS positive - bacteruria / Rh + / R immune   - Pen G for GBS prophylaxis      PROM (clr @ 1800 22)   - Patient presented grossly ruptured with PROM at 1800   - Nitrazine positive   - VSS, Afebrile. Patient with elevated blood pressures, non severe range. - TATO Cunha   - cEFM/toco   - IVF LR @125cc/hr   - Admission labs ordered   - Pen G ordered for GBS prophylaxis   - Plan of induction: Cytotec 25mcg buccal ordered   - Continue to monitor closely    Preeclampsia without severe features (newly dx)   - Patient's initial blood pressure 138/90. Patient with elevated blood pressures 22.    - PreE labs 22 wnl, however P/C 1.53  - Patient meets criteria today for preeclampsia  without severe features today with elevated blood pressures  - Patient denies s/sx of preeclampsia   - Preeclampsia labs ordered  - Will continue to monitor closely    Hypothyroid   - Patient compliant with synthroid 88mg qd, ordered  -TSH 0.63 on 22   - Clinically asymptomatic      Anxiety   - Mood stable on no meds     BMI 49       Patient Active Problem List    Diagnosis Date Noted    High-risk pregnancy, unspecified trimester 2022     Priority: Medium    39 weeks gestation of pregnancy 2022     Priority: Medium    Need for diphtheria-tetanus-pertussis (Tdap) vaccine 2022     Priority: Medium    COVID-19 vaccine administered 2022 Priority: Medium    Hypothyroid in pregnancy, antepartum 04/21/2022     Priority: Medium     needs TSH q 4 weeks; o Synthroid 88 mg  TSH 0.43 on 1/6/22  TSH 0.68 2/10/22   4/22/22 TSH      0.63         T4          Elv BP x2 07/17/2022     During triage on 7/17/22; two elevated BP less than four hours apart; patient asymptomatic; PreE labs wnl, P/C 1.53      Proteinuria 07/17/2022     P/C 1.53 on 7/17/22      High-risk pregnancy 04/21/2022     Imitrex but has not taken since she found out she was pregnant  Stopped taking her Actos once she found out she was pregnant      Obesity in pregnancy 04/21/2022     Pre pregnant BMI- 45.67  Growth US  Asa @13 weeks  Early GTT- 133      Hypothyroid 03/04/2022    Elv Early 1 hr GTT, Needs Early 3 hr ordered 03/04/2022     Early 1 hr GTT: 133  Early 3 hr GTT: **      Anxiety 05/02/2017     No meds      PCOS      Was previously on metformin      GERD 07/14/2011       Plan discussed with Dr. Jake Flores, who is agreeable. Steroids given this admission: No    Risks, benefits, alternatives and possible complications have been discussed in detail with the patient. Admission, and post admission procedures and expectations were discussed in detail. All questions were answered. Attending's Name: Dr. Yina Quiñonez MD  Ob/Gyn Resident  7/22/2022, 9:31 PM    Resident Physician Statement  I have personally seen the patient. I agree with the assessment, plan and orders as documented by leonid resident. I have made changes to the above note as needed. I have discussed the case with above named attending.      Ellen Thacker, DO  OB/GYN PGY-3  7/22/2022  9:32 PM

## 2022-07-23 NOTE — FLOWSHEET NOTE
1930 Pt arrived to l&d with c/o leakage of fluid & ctx pt denies problems with pregnancy, pt appears to be comfortable, no guarding or grimacing noted, pt  Denies bleeding , pt states she feels baby moving. external fetal monitor applied and explained. Dr Concha Ricks notified of patient.

## 2022-07-23 NOTE — PROGRESS NOTES
Labor Progress Note    Chelo Cheema is a 29 y.o. female  at 38w11d  The patient was seen and examined. Her pain is well controlled. She reports fetal movement is present, complains of contractions, denies loss of fluid, denies vaginal bleeding. Vital Signs:  Vitals:    22 1956 22 0010 22 0140 22 0141   BP: (!) 138/90   139/83   Pulse: (!) 108   71   Resp: 16   16   Temp:  98.2 °F (36.8 °C)  98.2 °F (36.8 °C)   TempSrc:       SpO2:   97%          FHT:  145 , moderate variability, accelerations present, decelerations absent  Contractions: irregular, every 3-8 minutes    Chaperone for Intimate Exam: Chaperone was present for entire exam, Chaperone Name: Silvana Whalen RN  Cervical Exam: 1 cm dilated, 50% effaced, -3 station  Pitocin: @ 0 mu/min    Membranes: Intact  Scalp Electrode in place: absent  Intrauterine Pressure Catheter in Place: absent    Interventions: none    Assessment/Plan:  Chelo Cheema is a 29 y.o. female  at 38w11d admitted for PROM (clr @ 1800 22)   - GBS bacteruria, Pen G for GBS prophylaxis   - VSS, Afebrile   - cEFM/TOCO - Cat 1, irregular contractions   - S/p Cytotec 50 mcg buccal x1, 25 mcg x2   - SVE /-3   - Spoke with patient about sands balloon placement for induction of labor. Patient states she will think about it and will call out to her RN when she has made a decision. All questions and concerns answered. - Continue to monitor closely. Preeclampsia without severe features (newly dx)   - Blood pressure stable, some elevated blood pressures non-severe range   - PreE labs wnl, P/C 0.32 ()   - Denies s/sx of preeclampsia   - Continue to monitor closely    Attending updated and in agreement with plan Dr. Connie Pelaez MD  Ob/Gyn Resident  2022, 5:44 AM    Resident Physician Statement  I have personally seen the patient. I agree with the assessment, plan and orders as documented by leonid resident.  I have made changes to the above note as needed. I have discussed the case with above named attending. Christ Álvarez DO  OB/GYN PGY-3  7/23/2022  5:57 AM          Attending Physician Statement  I have discussed the care of Aminta Gallegos, including pertinent history and exam findings,  with the resident. I have seen and examined the patient and the key elements of all parts of the encounter have been performed by me. I agree with the assessment, plan and orders as documented by the resident.   ([facility])     Cornelio Mccray,

## 2022-07-23 NOTE — DISCHARGE SUMMARY
Obstetric Discharge Summary  [facility]    Patient Name: Gurdeep Oakse  Patient : 1990  Primary Care Physician: Sejal Lazo MD  Admit Date: 2022    Principal Diagnosis: IUP at 39w4d, admitted for Leakage of fluid     Her pregnancy has been complicated by:   Patient Active Problem List   Diagnosis    PCOS    Anxiety    GERD    Hypothyroid    Elv Early 1 hr GTT, Needs Early 3 hr ordered    Hypothyroid in pregnancy, antepartum    High-risk pregnancy    Need for diphtheria-tetanus-pertussis (Tdap) vaccine    COVID-19 vaccine administered    Preeclampsia w/o SF's (G1)    PLTCS 22 Apg 8/9 Wt 7#11    Anemia    Postoperative state       Infection Present?: No  Hospital Acquired: N/A    Surgical Operations & Procedures:  Analgesia: epidural  Delivery Type:  Delivery: PLTCS   Laceration(s): NA    Consultations: NICU and Anesthesia    Pertinent Findings & Procedures:   Gurdeep Oakes is a 29 y.o. female  at 39w4d admitted for PROM (clr @1800 on 22). Patient met criteria for preeclampsia without severe features during admission, preeclampsia labs wnl, P/C 0.32. For IOL she received Pen G for GBS prophylaxis, cytotec 25 mcg buccal x2, 50 mcg buccal x 2, Cooks cervical ripening balloon, pitocin, morphine/compazine x2, epidural, IUPC. After Cooks balloon was removed, patient was continued on pitocin with IUPC. She remained at 5cm despite adequate contractions. Due to meeting criteria for failed IOL (greater than 24 hours in latent stage) and membranes ruptured for 34 hours, discussed R/B/A of continuing with IOL versus proceeding with CS. Patient desired to proceed with CS and decision was made to proceed to the OR for primary CS 2/2 failed IOL. She delivered by primary low transverse  a Live Born infant on 22.        Information for the patient's :  Emely Us [[id number]]   female   Birth Weight: 7 lb 11.5 oz (3.5 kg) Apgars: 8 at 1 minute and 9 at 5 minutes. Postpartum course: normal.      POD#1 Hgb 8.4. Iron supplementation and 2wk H&H sent on discharge. Course of patient: complicated by newly diagnosed PreEclampsia without severe features    Discharge to: Home    Readmission planned: no     Recommendations on Discharge:     Medications:      Medication List        START taking these medications      docusate sodium 100 MG capsule  Commonly known as: Colace  Take 1 capsule by mouth 2 times daily as needed for Constipation     ferrous sulfate 325 (65 Fe) MG tablet  Commonly known as: IRON 325  Take 1 tablet by mouth in the morning. ibuprofen 800 MG tablet  Commonly known as: ADVIL;MOTRIN  Take 1 tablet by mouth every 8 hours as needed for Pain     oxyCODONE 5 MG immediate release tablet  Commonly known as: Roxicodone  Take 1 tablet by mouth every 6 hours as needed for Pain for up to 5 days. Intended supply: 3 days.  Take lowest dose possible to manage pain            CONTINUE taking these medications      levothyroxine 88 MCG tablet  Commonly known as: SYNTHROID  TAKE 1 TABLET BY MOUTH IN THE MORNING IMMEDIATELY UPON ARISING, DELAY EATING/DRINKING FOR 30 MINUTES     PRENATAL VITAMINS PO            STOP taking these medications      aspirin 81 MG chewable tablet               Where to Get Your Medications        These medications were sent to [address] [address]      Phone: [phone]   docusate sodium 100 MG capsule  ferrous sulfate 325 (65 Fe) MG tablet  ibuprofen 800 MG tablet  oxyCODONE 5 MG immediate release tablet           Activity: pelvic rest x 6 weeks, no driving while on narcotics, no lifting greater than 15 lbs  Diet: regular diet  Follow up: 1 week for Prevena dressing removal and post-op visit    Condition on discharge: stable    Discharge date: 7/26/22    Juliocesar De La Rosa DO  Ob/Gyn Resident    Comments:  Home care and follow-up care were reviewed. Pelvic rest, and birth control were reviewed. Signs and symptoms of mastitis and post partum depression were reviewed. The patient is to notify her physician if any of these occur. The patient was counseled on secondary smoke risks and the increased risk of sudden infant death syndrome and respiratory problems to her baby with exposure. She was counseled on various alternate recommendations to decrease the exposure to secondary smoke to her children.

## 2022-07-23 NOTE — PROGRESS NOTES
Labor Progress Note    Johnna Lynch is a 29 y.o. female  at 38w11d  The patient was seen and examined. Her pain is not well controlled. Patient requesting morphine/compazine for pain. She reports fetal movement is present, complains of contractions, complains of loss of fluid, denies vaginal bleeding. Vital Signs:  Vitals:    22 1501 22 1601 22 1800 22 1910   BP: 132/85 (!) 140/93 131/81 (!) 141/81   Pulse: 57 58 78 68   Resp:  18 18 16   Temp:  98.1 °F (36.7 °C) 98.6 °F (37 °C)    TempSrc:  Oral Oral    SpO2:         FHT: 140, moderate variability, accelerations present, decelerations absent  Contractions: irregular, every 2-5 minutes    Chaperone for Intimate Exam: Chaperone was present for entire exam, Chaperone Name: Armando Hunter RN  Cervical Exam: 5 cm dilated, 50 effaced, -2 station  Pitocin: @ 7 mu/min    Membranes: Ruptured clear fluid  Scalp Electrode in place: absent  Intrauterine Pressure Catheter in Place: absent    Interventions: SVE    Assessment/Plan:  Johnna yLnch is a 29 y.o. female  at 38w11d admitted for PROM (clr @ 1800 22)    - GBS bacteruria, Pen G for GBS prophylaxis   - Afebrile   - S/p Cytotec 25 BU x2, 50 x1   - S/p Cooks balloon   - Continue pitocin per protocol   - S/p morphine/compazine x1.  Another dose ordered   - Continue to monitor closely    PreE w/o SF's (newly dx)    - BP intermittently elevated but nonsevere range   - Denies s/s PreE   - PreE labs wnl, P/C 0.32 ()     Attending updated and in agreement with plan    Alexa Millan MD  Ob/Gyn Resident  2022, 7:58 PM

## 2022-07-23 NOTE — CARE COORDINATION
ANTEPARTUM NOTE    High-risk pregnancy, unspecified trimester [O09.90]    Aric Byrne was admitted to L&D on 7/22/2022 for rupture of membranes @ 39 4/7    OB GYN Provider: Tacos Yee    Will meet with patient after delivery to verify name/address/phone/insurance and discuss discharge planning. Anticipate DC home 2 nights after vaginal delivery or 4 nights after C/S delivery as long as hemodynamically stable.

## 2022-07-24 PROCEDURE — 2580000003 HC RX 258: Performed by: NURSE ANESTHETIST, CERTIFIED REGISTERED

## 2022-07-24 PROCEDURE — 2500000003 HC RX 250 WO HCPCS: Performed by: STUDENT IN AN ORGANIZED HEALTH CARE EDUCATION/TRAINING PROGRAM

## 2022-07-24 PROCEDURE — 6360000002 HC RX W HCPCS: Performed by: NURSE ANESTHETIST, CERTIFIED REGISTERED

## 2022-07-24 PROCEDURE — 2580000003 HC RX 258: Performed by: STUDENT IN AN ORGANIZED HEALTH CARE EDUCATION/TRAINING PROGRAM

## 2022-07-24 PROCEDURE — 2709999900 HC NON-CHARGEABLE SUPPLY: Performed by: OBSTETRICS & GYNECOLOGY

## 2022-07-24 PROCEDURE — 88307 TISSUE EXAM BY PATHOLOGIST: CPT

## 2022-07-24 PROCEDURE — A4216 STERILE WATER/SALINE, 10 ML: HCPCS | Performed by: STUDENT IN AN ORGANIZED HEALTH CARE EDUCATION/TRAINING PROGRAM

## 2022-07-24 PROCEDURE — 3700000001 HC ADD 15 MINUTES (ANESTHESIA): Performed by: OBSTETRICS & GYNECOLOGY

## 2022-07-24 PROCEDURE — 62325 NJX INTERLAMINAR CRV/THRC: CPT | Performed by: ANESTHESIOLOGY

## 2022-07-24 PROCEDURE — 6370000000 HC RX 637 (ALT 250 FOR IP): Performed by: STUDENT IN AN ORGANIZED HEALTH CARE EDUCATION/TRAINING PROGRAM

## 2022-07-24 PROCEDURE — 6360000002 HC RX W HCPCS: Performed by: STUDENT IN AN ORGANIZED HEALTH CARE EDUCATION/TRAINING PROGRAM

## 2022-07-24 PROCEDURE — 7100000000 HC PACU RECOVERY - FIRST 15 MIN: Performed by: OBSTETRICS & GYNECOLOGY

## 2022-07-24 PROCEDURE — 3700000000 HC ANESTHESIA ATTENDED CARE: Performed by: OBSTETRICS & GYNECOLOGY

## 2022-07-24 PROCEDURE — 7100000001 HC PACU RECOVERY - ADDTL 15 MIN: Performed by: OBSTETRICS & GYNECOLOGY

## 2022-07-24 PROCEDURE — A4216 STERILE WATER/SALINE, 10 ML: HCPCS | Performed by: NURSE ANESTHETIST, CERTIFIED REGISTERED

## 2022-07-24 PROCEDURE — 2500000003 HC RX 250 WO HCPCS: Performed by: NURSE ANESTHETIST, CERTIFIED REGISTERED

## 2022-07-24 PROCEDURE — 1220000000 HC SEMI PRIVATE OB R&B

## 2022-07-24 PROCEDURE — 59510 CESAREAN DELIVERY: CPT | Performed by: OBSTETRICS & GYNECOLOGY

## 2022-07-24 PROCEDURE — 3609079900 HC CESAREAN SECTION: Performed by: OBSTETRICS & GYNECOLOGY

## 2022-07-24 RX ORDER — OXYCODONE HYDROCHLORIDE 5 MG/1
5 TABLET ORAL EVERY 4 HOURS PRN
Status: DISCONTINUED | OUTPATIENT
Start: 2022-07-24 | End: 2022-07-26 | Stop reason: HOSPADM

## 2022-07-24 RX ORDER — DOCUSATE SODIUM 100 MG/1
100 CAPSULE, LIQUID FILLED ORAL 2 TIMES DAILY PRN
Qty: 60 CAPSULE | Refills: 0 | Status: SHIPPED | OUTPATIENT
Start: 2022-07-24 | End: 2022-08-10

## 2022-07-24 RX ORDER — LIDOCAINE HYDROCHLORIDE 20 MG/ML
INJECTION, SOLUTION EPIDURAL; INFILTRATION; INTRACAUDAL; PERINEURAL PRN
Status: DISCONTINUED | OUTPATIENT
Start: 2022-07-24 | End: 2022-07-24 | Stop reason: SDUPTHER

## 2022-07-24 RX ORDER — OXYCODONE HYDROCHLORIDE 5 MG/1
5 TABLET ORAL EVERY 6 HOURS PRN
Qty: 18 TABLET | Refills: 0 | Status: SHIPPED | OUTPATIENT
Start: 2022-07-24 | End: 2022-07-29

## 2022-07-24 RX ORDER — OXYCODONE HYDROCHLORIDE 5 MG/1
10 TABLET ORAL EVERY 4 HOURS PRN
Status: DISCONTINUED | OUTPATIENT
Start: 2022-07-24 | End: 2022-07-26 | Stop reason: HOSPADM

## 2022-07-24 RX ORDER — KETOROLAC TROMETHAMINE 30 MG/ML
30 INJECTION, SOLUTION INTRAMUSCULAR; INTRAVENOUS EVERY 6 HOURS
Status: COMPLETED | OUTPATIENT
Start: 2022-07-24 | End: 2022-07-24

## 2022-07-24 RX ORDER — MORPHINE SULFATE 1 MG/ML
INJECTION, SOLUTION EPIDURAL; INTRATHECAL; INTRAVENOUS PRN
Status: DISCONTINUED | OUTPATIENT
Start: 2022-07-24 | End: 2022-07-24 | Stop reason: SDUPTHER

## 2022-07-24 RX ORDER — VITAMIN A, ASCORBIC ACID, CHOLECALCIFEROL, .ALPHA.-TOCOPHEROL ACETATE, DL-, THIAMINE MONONITRATE, RIBOFLAVIN, NIACINAMIDE, PYRIDOXINE HYDROCHLORIDE, FOLIC ACID, CYANOCOBALAMIN, CALCIUM CARBONATE, IRON, ZINC OXIDE, AND CUPRIC OXIDE 4000; 120; 400; 22; 1.84; 3; 20; 10; 1; 12; 200; 29; 25; 2 [IU]/1; MG/1; [IU]/1; [IU]/1; MG/1; MG/1; MG/1; MG/1; MG/1; UG/1; MG/1; MG/1; MG/1; MG/1
1 TABLET ORAL DAILY
Status: DISCONTINUED | OUTPATIENT
Start: 2022-07-24 | End: 2022-07-26 | Stop reason: HOSPADM

## 2022-07-24 RX ORDER — IBUPROFEN 800 MG/1
800 TABLET ORAL EVERY 8 HOURS PRN
Qty: 60 TABLET | Refills: 1 | Status: SHIPPED | OUTPATIENT
Start: 2022-07-24

## 2022-07-24 RX ORDER — NALOXONE HYDROCHLORIDE 0.4 MG/ML
0.4 INJECTION, SOLUTION INTRAMUSCULAR; INTRAVENOUS; SUBCUTANEOUS PRN
Status: DISCONTINUED | OUTPATIENT
Start: 2022-07-24 | End: 2022-07-26 | Stop reason: HOSPADM

## 2022-07-24 RX ORDER — ENOXAPARIN SODIUM 100 MG/ML
80 INJECTION SUBCUTANEOUS DAILY
Status: DISCONTINUED | OUTPATIENT
Start: 2022-07-24 | End: 2022-07-26 | Stop reason: HOSPADM

## 2022-07-24 RX ORDER — SIMETHICONE 80 MG
80 TABLET,CHEWABLE ORAL EVERY 6 HOURS PRN
Status: DISCONTINUED | OUTPATIENT
Start: 2022-07-24 | End: 2022-07-26 | Stop reason: HOSPADM

## 2022-07-24 RX ORDER — SODIUM CHLORIDE 9 MG/ML
INJECTION, SOLUTION INTRAVENOUS PRN
Status: DISCONTINUED | OUTPATIENT
Start: 2022-07-24 | End: 2022-07-26 | Stop reason: HOSPADM

## 2022-07-24 RX ORDER — TRISODIUM CITRATE DIHYDRATE AND CITRIC ACID MONOHYDRATE 500; 334 MG/5ML; MG/5ML
30 SOLUTION ORAL ONCE
Status: COMPLETED | OUTPATIENT
Start: 2022-07-24 | End: 2022-07-24

## 2022-07-24 RX ORDER — SCOLOPAMINE TRANSDERMAL SYSTEM 1 MG/1
1 PATCH, EXTENDED RELEASE TRANSDERMAL
Status: DISCONTINUED | OUTPATIENT
Start: 2022-07-24 | End: 2022-07-26 | Stop reason: HOSPADM

## 2022-07-24 RX ORDER — DIPHENHYDRAMINE HYDROCHLORIDE 50 MG/ML
25 INJECTION INTRAMUSCULAR; INTRAVENOUS EVERY 6 HOURS PRN
Status: DISCONTINUED | OUTPATIENT
Start: 2022-07-24 | End: 2022-07-25

## 2022-07-24 RX ORDER — SODIUM CHLORIDE 0.9 % (FLUSH) 0.9 %
5-40 SYRINGE (ML) INJECTION PRN
Status: DISCONTINUED | OUTPATIENT
Start: 2022-07-24 | End: 2022-07-26 | Stop reason: HOSPADM

## 2022-07-24 RX ORDER — POLYETHYLENE GLYCOL 3350 17 G/17G
17 POWDER, FOR SOLUTION ORAL DAILY
Status: DISCONTINUED | OUTPATIENT
Start: 2022-07-24 | End: 2022-07-26 | Stop reason: HOSPADM

## 2022-07-24 RX ORDER — ROPIVACAINE HYDROCHLORIDE 2 MG/ML
INJECTION, SOLUTION EPIDURAL; INFILTRATION; PERINEURAL PRN
Status: DISCONTINUED | OUTPATIENT
Start: 2022-07-23 | End: 2022-07-24 | Stop reason: SDUPTHER

## 2022-07-24 RX ORDER — SODIUM CHLORIDE 0.9 % (FLUSH) 0.9 %
5-40 SYRINGE (ML) INJECTION EVERY 12 HOURS SCHEDULED
Status: DISCONTINUED | OUTPATIENT
Start: 2022-07-24 | End: 2022-07-26 | Stop reason: HOSPADM

## 2022-07-24 RX ORDER — DEXAMETHASONE SODIUM PHOSPHATE 10 MG/ML
INJECTION INTRAMUSCULAR; INTRAVENOUS PRN
Status: DISCONTINUED | OUTPATIENT
Start: 2022-07-24 | End: 2022-07-24 | Stop reason: SDUPTHER

## 2022-07-24 RX ORDER — BISACODYL 10 MG
10 SUPPOSITORY, RECTAL RECTAL DAILY PRN
Status: DISCONTINUED | OUTPATIENT
Start: 2022-07-24 | End: 2022-07-26 | Stop reason: HOSPADM

## 2022-07-24 RX ORDER — SODIUM CHLORIDE 9 MG/ML
INJECTION INTRAVENOUS PRN
Status: DISCONTINUED | OUTPATIENT
Start: 2022-07-24 | End: 2022-07-24 | Stop reason: SDUPTHER

## 2022-07-24 RX ORDER — ONDANSETRON 2 MG/ML
4 INJECTION INTRAMUSCULAR; INTRAVENOUS EVERY 6 HOURS PRN
Status: DISCONTINUED | OUTPATIENT
Start: 2022-07-24 | End: 2022-07-26 | Stop reason: HOSPADM

## 2022-07-24 RX ORDER — DOCUSATE SODIUM 100 MG/1
100 CAPSULE, LIQUID FILLED ORAL 2 TIMES DAILY
Status: DISCONTINUED | OUTPATIENT
Start: 2022-07-24 | End: 2022-07-26 | Stop reason: HOSPADM

## 2022-07-24 RX ORDER — SODIUM CHLORIDE, SODIUM LACTATE, POTASSIUM CHLORIDE, CALCIUM CHLORIDE 600; 310; 30; 20 MG/100ML; MG/100ML; MG/100ML; MG/100ML
INJECTION, SOLUTION INTRAVENOUS CONTINUOUS
Status: DISCONTINUED | OUTPATIENT
Start: 2022-07-24 | End: 2022-07-26 | Stop reason: HOSPADM

## 2022-07-24 RX ORDER — METRONIDAZOLE 500 MG/1
500 TABLET ORAL EVERY 8 HOURS SCHEDULED
Status: COMPLETED | OUTPATIENT
Start: 2022-07-24 | End: 2022-07-26

## 2022-07-24 RX ORDER — IBUPROFEN 600 MG/1
600 TABLET ORAL EVERY 6 HOURS
Status: DISCONTINUED | OUTPATIENT
Start: 2022-07-25 | End: 2022-07-26 | Stop reason: HOSPADM

## 2022-07-24 RX ORDER — METHYLERGONOVINE MALEATE 0.2 MG/ML
INJECTION INTRAVENOUS PRN
Status: DISCONTINUED | OUTPATIENT
Start: 2022-07-24 | End: 2022-07-24 | Stop reason: SDUPTHER

## 2022-07-24 RX ORDER — ACETAMINOPHEN 500 MG
1000 TABLET ORAL ONCE
Status: COMPLETED | OUTPATIENT
Start: 2022-07-24 | End: 2022-07-24

## 2022-07-24 RX ORDER — ONDANSETRON 2 MG/ML
INJECTION INTRAMUSCULAR; INTRAVENOUS PRN
Status: DISCONTINUED | OUTPATIENT
Start: 2022-07-24 | End: 2022-07-24 | Stop reason: SDUPTHER

## 2022-07-24 RX ORDER — CEPHALEXIN 500 MG/1
500 CAPSULE ORAL EVERY 8 HOURS SCHEDULED
Status: COMPLETED | OUTPATIENT
Start: 2022-07-24 | End: 2022-07-26

## 2022-07-24 RX ORDER — LIDOCAINE HYDROCHLORIDE AND EPINEPHRINE 15; 5 MG/ML; UG/ML
INJECTION, SOLUTION EPIDURAL PRN
Status: DISCONTINUED | OUTPATIENT
Start: 2022-07-23 | End: 2022-07-24 | Stop reason: SDUPTHER

## 2022-07-24 RX ORDER — LANOLIN 72 %
OINTMENT (GRAM) TOPICAL
Status: DISCONTINUED | OUTPATIENT
Start: 2022-07-24 | End: 2022-07-26 | Stop reason: HOSPADM

## 2022-07-24 RX ADMIN — SODIUM BICARBONATE 2 MEQ: 84 INJECTION, SOLUTION INTRAVENOUS at 04:16

## 2022-07-24 RX ADMIN — MORPHINE SULFATE 3 MG: 1 INJECTION, SOLUTION EPIDURAL; INTRATHECAL; INTRAVENOUS at 04:45

## 2022-07-24 RX ADMIN — LIDOCAINE HYDROCHLORIDE 10 ML: 20 INJECTION, SOLUTION EPIDURAL; INFILTRATION; INTRACAUDAL; PERINEURAL at 04:16

## 2022-07-24 RX ADMIN — Medication 1 TABLET: at 08:30

## 2022-07-24 RX ADMIN — Medication 909 ML/HR: at 04:42

## 2022-07-24 RX ADMIN — SODIUM CHLORIDE 1 ML: 9 INJECTION, SOLUTION INTRAMUSCULAR; INTRAVENOUS; SUBCUTANEOUS at 05:07

## 2022-07-24 RX ADMIN — KETOROLAC TROMETHAMINE 30 MG: 30 INJECTION, SOLUTION INTRAMUSCULAR; INTRAVENOUS at 08:30

## 2022-07-24 RX ADMIN — SODIUM CHLORIDE, PRESERVATIVE FREE 10 ML: 5 INJECTION INTRAVENOUS at 20:57

## 2022-07-24 RX ADMIN — SODIUM CHLORIDE, POTASSIUM CHLORIDE, SODIUM LACTATE AND CALCIUM CHLORIDE: 600; 310; 30; 20 INJECTION, SOLUTION INTRAVENOUS at 14:59

## 2022-07-24 RX ADMIN — DOCUSATE SODIUM 100 MG: 100 CAPSULE ORAL at 08:28

## 2022-07-24 RX ADMIN — METRONIDAZOLE 500 MG: 500 TABLET, FILM COATED ORAL at 23:51

## 2022-07-24 RX ADMIN — FAMOTIDINE 20 MG: 10 INJECTION, SOLUTION INTRAVENOUS at 03:12

## 2022-07-24 RX ADMIN — PHENYLEPHRINE HYDROCHLORIDE 100 MCG: 10 INJECTION INTRAVENOUS at 04:32

## 2022-07-24 RX ADMIN — ACETAMINOPHEN 1000 MG: 500 TABLET ORAL at 03:12

## 2022-07-24 RX ADMIN — KETOROLAC TROMETHAMINE 30 MG: 30 INJECTION, SOLUTION INTRAMUSCULAR; INTRAVENOUS at 20:58

## 2022-07-24 RX ADMIN — LIDOCAINE HYDROCHLORIDE 10 ML: 20 INJECTION, SOLUTION EPIDURAL; INFILTRATION; INTRACAUDAL; PERINEURAL at 04:13

## 2022-07-24 RX ADMIN — LIDOCAINE HYDROCHLORIDE 5 ML: 20 INJECTION, SOLUTION EPIDURAL; INFILTRATION; INTRACAUDAL; PERINEURAL at 05:07

## 2022-07-24 RX ADMIN — METHYLERGONOVINE MALEATE 200 MCG: 0.2 INJECTION, SOLUTION INTRAMUSCULAR; INTRAVENOUS at 04:51

## 2022-07-24 RX ADMIN — LEVOTHYROXINE SODIUM 88 MCG: 88 TABLET ORAL at 08:30

## 2022-07-24 RX ADMIN — DOCUSATE SODIUM 100 MG: 100 CAPSULE ORAL at 20:55

## 2022-07-24 RX ADMIN — Medication 3000 MG: at 03:17

## 2022-07-24 RX ADMIN — Medication 2.5 MILLION UNITS: at 00:56

## 2022-07-24 RX ADMIN — LIDOCAINE HYDROCHLORIDE 5 ML: 20 INJECTION, SOLUTION EPIDURAL; INFILTRATION; INTRACAUDAL; PERINEURAL at 04:57

## 2022-07-24 RX ADMIN — CEPHALEXIN 500 MG: 500 CAPSULE ORAL at 23:51

## 2022-07-24 RX ADMIN — DEXAMETHASONE SODIUM PHOSPHATE 10 MG: 10 INJECTION INTRAMUSCULAR; INTRAVENOUS at 04:42

## 2022-07-24 RX ADMIN — ENOXAPARIN SODIUM 80 MG: 100 INJECTION SUBCUTANEOUS at 18:24

## 2022-07-24 RX ADMIN — ONDANSETRON 4 MG: 2 INJECTION INTRAMUSCULAR; INTRAVENOUS at 04:42

## 2022-07-24 RX ADMIN — KETOROLAC TROMETHAMINE 30 MG: 30 INJECTION, SOLUTION INTRAMUSCULAR; INTRAVENOUS at 15:29

## 2022-07-24 RX ADMIN — Medication 500 MG: at 04:13

## 2022-07-24 RX ADMIN — SODIUM CITRATE AND CITRIC ACID MONOHYDRATE 30 ML: 500; 334 SOLUTION ORAL at 03:12

## 2022-07-24 ASSESSMENT — PAIN DESCRIPTION - DESCRIPTORS
DESCRIPTORS: ACHING
DESCRIPTORS: ACHING
DESCRIPTORS: CRAMPING

## 2022-07-24 ASSESSMENT — PAIN DESCRIPTION - LOCATION
LOCATION: ABDOMEN
LOCATION: ABDOMEN
LOCATION: INCISION

## 2022-07-24 ASSESSMENT — PAIN SCALES - GENERAL
PAINLEVEL_OUTOF10: 3
PAINLEVEL_OUTOF10: 4
PAINLEVEL_OUTOF10: 3

## 2022-07-24 ASSESSMENT — PAIN - FUNCTIONAL ASSESSMENT: PAIN_FUNCTIONAL_ASSESSMENT: ACTIVITIES ARE NOT PREVENTED

## 2022-07-24 ASSESSMENT — PAIN DESCRIPTION - ORIENTATION: ORIENTATION: MID

## 2022-07-24 NOTE — LACTATION NOTE
Baby beginning to cue, attempted to latch in football. Baby unable to maintain latch, nipples flatten and retract when compressed. 20mm shield applied, baby latched but made minimal effort to suck and dozed. Blood sugar per OT 61.

## 2022-07-24 NOTE — ANESTHESIA PROCEDURE NOTES
Epidural Block    Patient location during procedure: OB  Start time: 7/24/2022 11:36 PM  End time: 7/24/2022 11:45 PM  Reason for block: procedure for pain, labor epidural and at surgeon's request  Staffing  Performed: resident/CRNA   Resident/CRNA: DAVIAN Benson CRNA  Epidural  Patient position: sitting  Prep: Betadine  Patient monitoring: cardiac monitor, continuous pulse ox and frequent blood pressure checks  Approach: midline  Location: L4-5  Injection technique: CARMEN air  Provider prep: mask and sterile gloves  Needle  Needle type: Tuohy   Needle gauge: 17 G  Needle length: 3.5 in  Needle insertion depth: 7 cm  Catheter type: end hole  Catheter size: 19 G  Catheter at skin depth: 13 cm  Test dose: negativeCatheter Secured: tegaderm and tape  Assessment  Sensory level: T6  Hemodynamics: stable  Attempts: 1  Outcomes: uncomplicated and patient tolerated procedure well  Preanesthetic Checklist  Completed: patient identified, IV checked, risks and benefits discussed, equipment checked, pre-op evaluation, timeout performed, anesthesia consent given, oxygen available and monitors applied/VS acknowledged

## 2022-07-24 NOTE — ANESTHESIA PROCEDURE NOTES
Epidural Block    Patient location during procedure: pre-op  Start time: 7/23/2022 11:36 PM  End time: 7/23/2022 11:45 PM  Reason for block: procedure for pain, labor epidural and at surgeon's request  Staffing  Performed: resident/CRNA   Resident/CRNA: DAVIAN Padilla CRNA  Epidural  Patient position: sitting  Prep: Betadine  Patient monitoring: cardiac monitor, continuous pulse ox and frequent blood pressure checks  Approach: midline  Location: L4-5  Injection technique: CARMEN air  Provider prep: mask and sterile gloves  Needle  Needle type: Tuohy   Needle gauge: 17 G  Needle length: 3.5 in  Needle insertion depth: 7 cm  Catheter type: end hole  Catheter size: 19 G  Catheter at skin depth: 13 cm  Test dose: negativeCatheter Secured: tegaderm and tape  Assessment  Sensory level: T6  Hemodynamics: stable  Attempts: 1  Outcomes: uncomplicated and patient tolerated procedure well  Preanesthetic Checklist  Completed: patient identified, IV checked, risks and benefits discussed, equipment checked, pre-op evaluation, timeout performed, anesthesia consent given, oxygen available and monitors applied/VS acknowledged

## 2022-07-24 NOTE — FLOWSHEET NOTE
Called patient to review his lab results. Requested that he have a BMP, NT proBNP drawn tomorrow. He is scheduled for his CTA tomorrow. Advised patient that I would contact Dr. Keren Perez regarding his renal function. Patient transferred from  to [facility] unit by RN via bed with baby in arms and  at bedside. Bands checked, admission information reviewed with patient and , stated understanding.  Admission completed, will continue to monitor

## 2022-07-24 NOTE — BRIEF OP NOTE
Department of Obstetrics and Gynecology  Obstetrical Brief Operative Report  [address]    Patient: Reid Yancey   : 1990  MRN: [id number]       Acct: [de-identified]   Date of Procedure: 22    Pre-operative Diagnosis: 29 y.o. female  at 37w11d   Prelabor rupture of membranes  Prolonged Rupture of membranes  Category 2 tracing remove from delivery  Arrest of dilation  PreEclampsia without severe features  Hypothyroidism  Anxiety  BMI 49.8    Post-operative Diagnosis: Same as above and  living infant in occiput posterior presentation    Procedure: primary low transverse  section    Surgeon: Dr. Patti Smith Do  Assistant(s): Deneice Mcburney, PGY3    Anesthesia: epidural bolused with Lidocaine    Information for the patient's :  Jenny Jama [[id number]]   female   Birth Weight: 7 lb 11.5 oz (3.5 kg)   Information for the patient's :  Jenny Jama [[id number]]        Findings:  Live Born 7 lb 11 oz female infant in cephalic, occiput posterior presentation with Apgars of 8 at 1 minute and 9 at five minutes, normal appearing uterus tubes and ovaries   Estimated Blood Loss: pending  Total IV Fluids: 1000ml  Urine output: 100 mL clear urine   Drains:  sands catheter  Specimens:  placenta sent to pathology, cord blood, and cord gases  Instrument and Sponge Count: Correct  Complications: none  Condition: Mother and infant stable to post-anesthesia recovery    See operative report for full details. Anthony Payne DO  Ob/Gyn Resident  2022, 5:27 AM        Date: 2022  Time: 2:31 PM    Patient Name: Reid Yancey  Patient : 1990  Room/Bed: 8924/2505-76  Admission Date/Time: 2022  7:12 PM  MRN #: [id number]  [facility] #: [id number]      Attending Attestation:   I was present and scrubbed for the entire procedure.     Attending Name:  Viet Gavin DO

## 2022-07-24 NOTE — ANESTHESIA PRE PROCEDURE
Department of Anesthesiology  Preprocedure Note       Name:  Marizol Overton   Age:  29 y.o.  :  1990                                          MRN:  [id number]         Date:  2022      Surgeon: * No surgeons listed *    Procedure: * No procedures listed *    Medications prior to admission:   Prior to Admission medications    Medication Sig Start Date End Date Taking?  Authorizing Provider   levothyroxine (SYNTHROID) 88 MCG tablet TAKE 1 TABLET BY MOUTH IN THE MORNING IMMEDIATELY UPON ARISING, DELAY EATING/DRINKING FOR 30 MINUTES 22   Keyona Mccray DO   Prenatal Vit-Fe Fumarate-FA (PRENATAL VITAMINS PO) Take 1 tablet by mouth daily 3/7/18   Historical Provider, MD   aspirin 81 MG chewable tablet Take 81 mg by mouth daily 22   Historical Provider, MD   metFORMIN (GLUCOPHAGE-XR) 500 MG extended release tablet TAKE 4 TABLETS BY MOUTH EVERY DAY WITH BREAKFAST 21   DAVIAN Schneider - CNP       Current medications:    Current Facility-Administered Medications   Medication Dose Route Frequency Provider Last Rate Last Admin    oxytocin (PITOCIN) 30 units in 500 mL infusion  1-20 enmanuel-units/min IntraVENous Continuous Payton Pugh DO 12 mL/hr at 22 2246 12 enmanuel-units/min at 22 2246    calcium carbonate (TUMS) chewable tablet 1,000 mg  1,000 mg Oral TID PRN Karin Brooks DO        ropivacaine (NAROPIN) 0.2% injection 0.2%             naloxone 0.4 mg in 10 mL sodium chloride syringe   IntraVENous PRN Karlos Simmons MD        nalbuphine (NUBAIN) injection 5 mg  5 mg IntraVENous Q4H PRN Karlos Simmons MD        ondansetron [facility]) injection 4 mg  4 mg IntraVENous Q6H PRN Karlos Simmons MD        ropivacaine 0.2% in sodium chloride 0.9% (OB) epidural 100 mL  10 mL/hr Epidural Continuous Karlos Simmons MD        ePHEDrine injection 10 mg  10 mg IntraVENous As Directed RT PRN Karlos Simmons MD        levothyroxine (SYNTHROID) tablet 88 mcg  88 mcg Oral Daily Charles Muta, DO   88 mcg at 07/23/22 0813    lactated ringers infusion   IntraVENous Continuous Charles Muta,  mL/hr at 07/23/22 2156 Rate Verify at 07/23/22 2156    lactated ringers bolus  500 mL IntraVENous PRN Charles Muta, DO        Or    lactated ringers bolus  1,000 mL IntraVENous PRN Charles Muta, DO        sodium chloride flush 0.9 % injection 5-40 mL  5-40 mL IntraVENous 2 times per day Charles Muta, DO        sodium chloride flush 0.9 % injection 5-40 mL  5-40 mL IntraVENous PRN Charles Muta, DO        0.9 % sodium chloride infusion  25 mL IntraVENous PRN Charles Muta, DO        lidocaine PF 1 % injection 30 mL  30 mL Other PRN Charles Muta, DO        [facility]dansetron [facility]) injection 4 mg  4 mg IntraVENous Q6H PRN Charles Muta, DO        diphenhydrAMINE (BENADRYL) tablet 25 mg  25 mg Oral Q4H PRN Charles Muta, DO        oxytocin (PITOCIN) 30 units in 500 mL infusion  87.3 enmanuel-units/min IntraVENous Continuous PRN Charles Muta, DO        And    oxytocin (PITOCIN) 10 unit bolus from the bag  10 Units IntraVENous PRN Charles Muta, DO        methylergonovine (METHERGINE) injection 200 mcg  200 mcg IntraMUSCular PRN Charles Muta, DO        carboprost (HEMABATE) injection 250 mcg  250 mcg IntraMUSCular PRN Charles Muta, DO        miSOPROStol (CYTOTEC) tablet 800 mcg  800 mcg Rectal PRN Charles Muta, DO        acetaminophen (TYLENOL) tablet 1,000 mg  1,000 mg Oral Q6H PRN Charles Muta, DO   1,000 mg at 07/23/22 0751    penicillin G potassium in d5w IVPB 2.5 Million Units  2.5 Million Units IntraVENous Q4H Charles Muta,  mL/hr at 07/23/22 2104 2.5 Million Units at 07/23/22 2104       Allergies:  No Known Allergies    Problem List:    Patient Active Problem List   Diagnosis Code    PCOS E28.2    Anxiety F41.9    GERD K21.9    Hypothyroid E03.9    Elv Early 1 hr GTT, Needs Early 3 hr ordered R73.09    Hypothyroid in pregnancy, antepartum O99.280, E03.9    High-risk pregnancy O09.90    Obesity in pregnancy O99.210    Need for diphtheria-tetanus-pertussis (Tdap) vaccine Z23    COVID-19 vaccine administered Z23    Elv BP x2 O16.9    Proteinuria R80.9    39 weeks gestation of pregnancy Z3A.39    High-risk pregnancy, unspecified trimester O09.90    Preeclampsia w/o SF's (G1) O14.90       Past Medical History:        Diagnosis Date    Migraines 5/2/2017    Previously on Imitrex but stopped once she found out she was pregnanct    PCOS (polycystic ovarian syndrome)        Past Surgical History:        Procedure Laterality Date    TONSILLECTOMY  05/2013    WISDOM TOOTH EXTRACTION  12/2011       Social History:    Social History     Tobacco Use    Smoking status: Never    Smokeless tobacco: Never   Substance Use Topics    Alcohol use: Not Currently     Comment: occ,                                Counseling given: Not Answered      Vital Signs (Current):   Vitals:    07/23/22 1910 07/23/22 2001 07/23/22 2105 07/23/22 2127   BP: (!) 141/81 (!) 141/67 89/70    Pulse: 68 69 81    Resp: 16 16 16    Temp:  36.4 °C (97.5 °F)  36.8 °C (98.2 °F)   TempSrc:  Oral  Oral   SpO2:                                                  BP Readings from Last 3 Encounters:   07/23/22 89/70   07/22/22 122/80   07/19/22 136/83       NPO Status:                                                                                 BMI:   Wt Readings from Last 3 Encounters:   07/22/22 (!) 309 lb (140.2 kg)   07/14/22 (!) 309 lb (140.2 kg)   07/08/22 (!) 306 lb (138.8 kg)     There is no height or weight on file to calculate BMI.    CBC:   Lab Results   Component Value Date/Time    WBC 9.5 07/22/2022 08:03 PM    RBC 4.34 07/22/2022 08:03 PM    HGB 11.4 07/22/2022 08:03 PM    HCT 35.5 07/22/2022 08:03 PM    MCV 81.8 07/22/2022 08:03 PM    RDW 14.0 07/22/2022 08:03 PM     07/22/2022 08:03 PM       CMP:   Lab Results   Component Value Date/Time     07/22/2022 08:03 PM    K 3.8 07/22/2022 08:03 PM     07/22/2022 08:03 PM    CO2 18 07/22/2022 08:03 PM    BUN 10 07/22/2022 08:03 PM    CREATININE 0.62 07/22/2022 08:03 PM    GFRAA >60 07/22/2022 08:03 PM    LABGLOM >60 07/22/2022 08:03 PM    GLUCOSE 109 07/22/2022 08:03 PM    PROT 6.4 07/22/2022 08:03 PM    CALCIUM 9.3 07/22/2022 08:03 PM    BILITOT <0.10 07/22/2022 08:03 PM    ALKPHOS 133 07/22/2022 08:03 PM    AST 16 07/22/2022 08:03 PM    ALT <5 07/22/2022 08:03 PM       POC Tests: No results for input(s): POCGLU, POCNA, POCK, POCCL, POCBUN, POCHEMO, POCHCT in the last 72 hours. Coags: No results found for: PROTIME, INR, APTT    HCG (If Applicable):   Lab Results   Component Value Date    HCGQUANT 2,193 (H) 11/22/2021        ABGs: No results found for: PHART, PO2ART, VBV8RKT, ZVU8EIO, BEART, K1COAWME     Type & Screen (If Applicable):  No results found for: LABABO, LABRH    Drug/Infectious Status (If Applicable):  Lab Results   Component Value Date/Time    HEPCAB NONREACTIVE 10/29/2021 03:00 PM       COVID-19 Screening (If Applicable): No results found for: COVID19        Anesthesia Evaluation  Patient summary reviewed  Airway: Mallampati: II          Dental: normal exam         Pulmonary:Negative Pulmonary ROS and normal exam                               Cardiovascular:    (+) hypertension:,         Rhythm: regular  Rate: normal                    Neuro/Psych:   (+) headaches:,             GI/Hepatic/Renal:   (+) GERD:,           Endo/Other:    (+) hypothyroidism::., .                 Abdominal:             Vascular: negative vascular ROS. Other Findings:           Anesthesia Plan      epidural     ASA 2             Anesthetic plan and risks discussed with patient. Use of blood products discussed with patient whom.                      DAVIAN Membreno CRNA   7/23/2022

## 2022-07-24 NOTE — PROGRESS NOTES
Ob/Gyn Resident Interval Note         Patient discussion with attending concerning continuing induction of labor versus proceeding with  section. Due to prolonged ROM (now 39 hours), no cervical change since Cooks catheter was removed at 1947, and intermittent periods of Category 2 tracing (recurrent late decelerations, minimal variability), R/B/A discussed and patient desired to proceed with  section. Discussed R/B/A including pain, bleeding, infection, damage to surrounding structures (bladder, ureters, bowel, nerves, arteries, and veins), scarring of the skin and within the abdomen, need for further surgeries, anesthesia complications, post- op risks of blood clots or pneumonia. Patient reported understanding of risks and requested to proceed with  section. Vitals:    22 0016 22 0031 22 0100 22 0131   BP:  (!) 116/59 137/79 (!) 145/80   Pulse: 78 78 86 87   Resp:  16 16 16   Temp:  98.1 °F (36.7 °C)     TempSrc:  Oral     SpO2:  98% 99% 97%           Plan discussed with attending.        Andrey Rust DO  Ob/Gyn Resident  Pager: [phone] [facility]   :02 AM

## 2022-07-24 NOTE — PROGRESS NOTES
Labor Progress Note    Danial Diane is a 29 y.o. female  at 38w11d  The patient was seen and examined. Her pain is not well controlled. Patient requesting epidural. She reports fetal movement is present, complains of contractions, complains of loss of fluid, denies vaginal bleeding. Vital Signs:  Vitals:    22 1910 22   BP: (!) 141/81 (!) 141/67 89/70    Pulse: 68 69 81    Resp: 16 16 16    Temp:  97.5 °F (36.4 °C)  98.2 °F (36.8 °C)   TempSrc:  Oral  Oral   SpO2:         FHT: 140, moderate variability, accelerations present, rare subtle early/late decelerations   Contractions: irregular, not tracing well    Chaperone for Intimate Exam: NA  Cervical Exam: deferred, patient requesting epidural at this time prior to SVE  Pitocin: @ 12 mu/min    Membranes: Ruptured clear fluid  Scalp Electrode in place: absent  Intrauterine Pressure Catheter in Place: absent    Interventions: none    Assessment/Plan:  Danial Diane is a 29 y.o. female  at 38w11d admitted for  PROM (clr @ 1800 22)               - GBS bacteruria, Pen G for GBS prophylaxis              - Afebrile              - S/p Cytotec 25 BU x2, 50 x1              - S/p Cooks balloon              - Continue pitocin per protocol              - S/p morphine/compazine x2   - Patient requests an epidural now. Will perform SVE and IUPC placement after epidural as contractions not tracing well.               - Continue to monitor closely     PreE w/o SF's (newly dx)               - BP intermittently elevated but nonsevere range              - Denies s/s PreE              - PreE labs wnl, P/C 0.32 ()     Attending updated and in agreement with plan    Tami Valle MD  Ob/Gyn Resident  2022, 10:53 PM

## 2022-07-24 NOTE — CARE COORDINATION
CASE MANAGEMENT POST-PARTUM TRANSITIONAL CARE PLAN    High-risk pregnancy, unspecified trimester [O09.90]    OB Provider: Argenis Zurita met w/ Chantal Moreno at bedside to discuss DCP. She is S/P CS on 2022    Writer verified name/address/phone number correct on facesheet. She states she lives with her  Marnie Alexis. This is their first baby. [employer] insurance correct. Writer notified mom she has 30 days from date of birth to add  to insurance policy. Chantal Moreno verbalized understanding. Chantal Moreno confirmed a safe place for infant to sleep at home. Infant name on BC: Scott Mckee. Infant PCP Annalisa Summers.      DME: Has BP cuff at home  HOME CARE: no    Anticipate DC of couplet 2022    Readmission Risk              Risk of Unplanned Readmission:  [id number]

## 2022-07-24 NOTE — OP NOTE
Operative Note  Department of Obstetrics and Gynecology  [facility]     Patient: Radha Armando   : 1990  MRN: [id number]       Acct: [de-identified]   PCP: William Parnell MD  Date of Procedure: 22    Pre-operative Diagnosis: 29 y.o. female  at 39w6d   Arrest of dilation  Category 2 tracing remote from delivery  Prelabor Rupture of membranes  PreEclampsia without severe features  Anemia   Hypothyroidism  BMI 49.8     Post-operative Diagnosis: same as above and  living infant Female    Procedure: primary low transverse  section    Indications: Radha Armando is a 29 y.o.  female at 37w11d who presented with prelabor rupture of membranes. She underwent IOL with Cytotec, Cooks balloon, pitocin, IUPC. Patient failed to make cervical change past 5 cm. She had persistent category 2 tracing remote from delivery and pitocin was stopped. Discussed with patient at that time lack of cervical change despite ruptured on pitocin, and membranes rupture for 39 hours with risk of intraamniotic infection. Patient agreed to  section and decision was made to proceed. Surgeon: Dr. Anne Jonas DO  Assistants: Ivet Morgan DO, PGY3    Anesthesia: epidural bolused with lidocaine     Procedure Details   The patient was seen pre-operatively. The risks, benefits, complications, treatment options, and expected outcomes were discussed with the patient. The patient concurred with the proposed plan, giving informed consent. The patient was taken to the Operating Room, identified as Radha Armando and the procedure verified as  Delivery. A Time Out was held and the above information confirmed. After bolusing of epidural anesthesia, the patient was draped and prepped in the usual sterile manner. A Pfannenstiel incision was made and carried down through the subcutaneous tissue to the fascia using bovie electrocautery.  Fascial incision was made and extended transversely using bovie electrocautery for sharp dissection. The fascia was  from the underlying rectus tissue superiorly and inferiorly using blunt dissection. The peritoneum was identified and entered bluntly. Bovie electrocautery were used to take the peritoneum down sharply. Peritoneal incision was extended longitudinally with blunt stretch, bladder retractor was placed. A tagged lap was inserted into the abdominal cavity to pack the bowel out of the field. A low transverse uterine incision was made using a new scalpel blade. Blunt stretch on the hysterotomy incision was made and the amniotomy was performed revealing clear fluid. Delivered from cephalic presentation was a Live Born female infant. The infant was suctioned, dried and the umbilical cord was clamped and cut afte one minute delayed cord clamping. The infant was taken to the warmer and attended by NICU for evaluation. A second section of cord was clamped and cut and sent for gases. Cord blood was obtained for evaluation. The placenta was removed spontaneously with gentle traction and appeared intact, whole, and that the umbilical cord had three vessels noted. Pitocin was started. The uterine outline appeared normal. The uterus was exteriorized and cleaned of all clots and debris. The uterine incision was closed with running sutures of 0 Vicryl. Hemostasis was observed. An imbricating layer was placed with 0 Vicryl in running fashion. The uterus was reintroduced into the abdominal cavity. Bilateral abdominal gutters were cleared of all clots and debris. Bilateral tubes and ovaries were visualized and appeared normal. The hysterotomy was again inspected and found to be hemostatic. Abdomen was copiously irrigated. Peritoneum was reapproximated with running suture of 2-0 Vicryl. Rectus muscles were inspected and found to be hemostatic. The fascia was then reapproximated with running sutures of 0 Vicryl.  The subcuticular space was irrigated copiously. The subcuticular space was closed using a 2-0 Chromic suture in a running fashion. The skin was reapproximated with Insorb stapler. The skin was then cleansed and dressed with a Prevena dressing in sterile fashion. Instrument, sponge, and needle counts were correct prior the abdominal closure and at the conclusion of the case. The urine remained clear throughout the case. Ancef and azithromycin was given for antibiotic prophylaxis. SCDs for DVT prophylaxis remain in place for the post operative period. Dr. Eloisa Fink was present for the entire operation. Findings:  Live Born 7 lb 11 oz female infant in cephalic, occiput posterior presentation with Apgars of 8 at 1 minute and 9 at five minutes, normal appearing uterus tubes and ovaries  Estimated Blood Loss: 505 mL immediately following delivery  Total IV Fluids: 1000ml  Urine output: 100 mL clear urine  Drains:  sansd catheter  Specimens:  placenta sent to pathology, cord blood, and cord gases  Instrument and Sponge Count: Correct  Complications: none  Condition: Mother and infant stable to post-anesthesia recovery      Ros Santos DO  OB/GYN Resident  7/24/2022, 5:33 AM    This operative report was performed by a resident physician and then was thoroughly reviewed by the attending prior to being signed.

## 2022-07-24 NOTE — PROGRESS NOTES
Labor Progress Note    Stefan Yancey is a 29 y.o. female  at 37w11d  The patient was seen and examined. Her pain is well controlled with epidural. She reports fetal movement is present, complains of contractions, complains of loss of fluid, denies vaginal bleeding. Vital Signs:  Vitals:    22 2346 22 2350 22 2356 22 0000   BP: (!) 146/94 (!) 142/93 (!) 123/55 (!) 112/56   Pulse: 79 (!) 110 92 (!) 113   Resp: (P) 16      Temp: (P) 98.2 °F (36.8 °C)      TempSrc:       SpO2:  99%  97%     FHT:  140 , moderate variability, accelerations present, decelerations absent (suspicious decel noted @ 0004 on monitor. However, resident and RN at bedside and  [address] were noted to be tracing the same)  Contractions: irregular, every 3-5 minutes    Chaperone for Intimate Exam: Chaperone was present for entire exam, Chaperone Name: Jose E Washington RN  Cervical Exam: 5 cm dilated, 60 effaced, -2 station  Pitocin: @ 12 mu/min    Membranes: Ruptured clear fluid  Scalp Electrode in place: absent  Intrauterine Pressure Catheter in Place: absent    Interventions: SVE, IUPC placement without difficulty    Assessment/Plan:  Stefan Yancey is a 29 y.o. female  at 37w11d admitted for PROM (clr @ 1800 22)               - GBS bacteruria, Pen G for GBS prophylaxis              - Afebrile              - S/p Cytotec 25 BU x2, 50 x1              - S/p Cooks balloon   - IUPC in place              - Continue pitocin per protocol              - S/p morphine/compazine x2              - Patient requests an epidural now. Will perform SVE and IUPC placement after epidural as contractions not tracing well.               - Continue to monitor closely     PreE w/o SF's (newly dx)               - BP intermittently elevated but nonsevere range              - Denies s/s PreE              - PreE labs wnl, P/C 0.32 ()     Attending updated and in agreement with plan    Jacolyn Lefort, MD  Ob/Gyn Resident  2022, 12:22 AM

## 2022-07-24 NOTE — FLOWSHEET NOTE
Dr. Lucy Davila at bedside. Epidural procedure explained, risks discussed. Pt verbalizes consent for epidural.   2320 patient positioned for epidural.2329 Time out completed. 2345 catheter placed. 2346 test dose given. Epidural catheter taped and secured per anesthesia. 2348 to low fowlers with left uterine displacement. 2354 loading dose given. 0000 pump initiated. Pt tolerated procedure well.

## 2022-07-24 NOTE — PROGRESS NOTES
Ob/Gyn Resident Interval Note         Patient examined with RN at bedside. She is feeling some more pressure and reports more bloody show. Offered SVE to patient and she declined at this time. cEFM Cat 1 tracing, Pyote showing irregular contractions. Pitocin at 8. Will continue to titrate pitocin. Vitals:    07/23/22 1910 07/23/22 2001 07/23/22 2105 07/23/22 2127   BP: (!) 141/81 (!) 141/67 89/70    Pulse: 68 69 81    Resp: 16 16 16    Temp:  97.5 °F (36.4 °C)  98.2 °F (36.8 °C)   TempSrc:  Oral  Oral   SpO2:               Plan discussed with attending.        Efrem Chau DO  Ob/Gyn Resident  Pager: [phone] [facility]   7/23/202210:01 PM

## 2022-07-25 PROBLEM — Z98.890 POSTOPERATIVE STATE: Status: ACTIVE | Noted: 2022-07-25

## 2022-07-25 PROBLEM — O09.90 HIGH-RISK PREGNANCY, UNSPECIFIED TRIMESTER: Status: RESOLVED | Noted: 2022-07-22 | Resolved: 2022-07-25

## 2022-07-25 PROBLEM — O99.210 OBESITY IN PREGNANCY: Status: RESOLVED | Noted: 2022-04-21 | Resolved: 2022-07-25

## 2022-07-25 PROBLEM — D64.9 ANEMIA: Status: ACTIVE | Noted: 2022-07-25

## 2022-07-25 PROBLEM — O16.9 ELEVATED BLOOD PRESSURE AFFECTING PREGNANCY, ANTEPARTUM: Status: RESOLVED | Noted: 2022-07-17 | Resolved: 2022-07-25

## 2022-07-25 PROBLEM — R80.9 PROTEINURIA: Status: RESOLVED | Noted: 2022-07-17 | Resolved: 2022-07-25

## 2022-07-25 PROBLEM — Z3A.39 39 WEEKS GESTATION OF PREGNANCY: Status: RESOLVED | Noted: 2022-07-19 | Resolved: 2022-07-25

## 2022-07-25 LAB
ABSOLUTE EOS #: 0.12 K/UL (ref 0–0.44)
ABSOLUTE IMMATURE GRANULOCYTE: 0.04 K/UL (ref 0–0.3)
ABSOLUTE LYMPH #: 3.52 K/UL (ref 1.1–3.7)
ABSOLUTE MONO #: 0.7 K/UL (ref 0.1–1.2)
BASOPHILS # BLD: 0 % (ref 0–2)
BASOPHILS ABSOLUTE: 0.03 K/UL (ref 0–0.2)
EOSINOPHILS RELATIVE PERCENT: 1 % (ref 1–4)
HCT VFR BLD CALC: 26.9 % (ref 36.3–47.1)
HEMOGLOBIN: 8.5 G/DL (ref 11.9–15.1)
IMMATURE GRANULOCYTES: 0 %
LYMPHOCYTES # BLD: 38 % (ref 24–43)
MCH RBC QN AUTO: 26.6 PG (ref 25.2–33.5)
MCHC RBC AUTO-ENTMCNC: 31.6 G/DL (ref 28.4–34.8)
MCV RBC AUTO: 84.1 FL (ref 82.6–102.9)
MONOCYTES # BLD: 8 % (ref 3–12)
NRBC AUTOMATED: 0 PER 100 WBC
PDW BLD-RTO: 14.5 % (ref 11.8–14.4)
PLATELET # BLD: 230 K/UL (ref 138–453)
PMV BLD AUTO: 10.8 FL (ref 8.1–13.5)
RBC # BLD: 3.2 M/UL (ref 3.95–5.11)
RBC # BLD: ABNORMAL 10*6/UL
SEG NEUTROPHILS: 53 % (ref 36–65)
SEGMENTED NEUTROPHILS ABSOLUTE COUNT: 4.9 K/UL (ref 1.5–8.1)
WBC # BLD: 9.3 K/UL (ref 3.5–11.3)

## 2022-07-25 PROCEDURE — 36415 COLL VENOUS BLD VENIPUNCTURE: CPT

## 2022-07-25 PROCEDURE — 6370000000 HC RX 637 (ALT 250 FOR IP): Performed by: STUDENT IN AN ORGANIZED HEALTH CARE EDUCATION/TRAINING PROGRAM

## 2022-07-25 PROCEDURE — 6360000002 HC RX W HCPCS: Performed by: STUDENT IN AN ORGANIZED HEALTH CARE EDUCATION/TRAINING PROGRAM

## 2022-07-25 PROCEDURE — 2580000003 HC RX 258: Performed by: STUDENT IN AN ORGANIZED HEALTH CARE EDUCATION/TRAINING PROGRAM

## 2022-07-25 PROCEDURE — 1220000000 HC SEMI PRIVATE OB R&B

## 2022-07-25 PROCEDURE — 85025 COMPLETE CBC W/AUTO DIFF WBC: CPT

## 2022-07-25 RX ORDER — ACETAMINOPHEN 500 MG
1000 TABLET ORAL EVERY 6 HOURS
Status: DISCONTINUED | OUTPATIENT
Start: 2022-07-25 | End: 2022-07-26 | Stop reason: HOSPADM

## 2022-07-25 RX ORDER — FERROUS SULFATE 325(65) MG
325 TABLET ORAL DAILY
Qty: 30 TABLET | Refills: 3 | Status: SHIPPED | OUTPATIENT
Start: 2022-07-25 | End: 2022-08-10

## 2022-07-25 RX ADMIN — DOCUSATE SODIUM 100 MG: 100 CAPSULE ORAL at 09:30

## 2022-07-25 RX ADMIN — OXYCODONE 5 MG: 5 TABLET ORAL at 02:45

## 2022-07-25 RX ADMIN — METRONIDAZOLE 500 MG: 500 TABLET, FILM COATED ORAL at 06:26

## 2022-07-25 RX ADMIN — DOCUSATE SODIUM 100 MG: 100 CAPSULE ORAL at 22:15

## 2022-07-25 RX ADMIN — CEPHALEXIN 500 MG: 500 CAPSULE ORAL at 14:00

## 2022-07-25 RX ADMIN — METRONIDAZOLE 500 MG: 500 TABLET, FILM COATED ORAL at 22:15

## 2022-07-25 RX ADMIN — IBUPROFEN 600 MG: 600 TABLET, FILM COATED ORAL at 22:16

## 2022-07-25 RX ADMIN — METRONIDAZOLE 500 MG: 500 TABLET, FILM COATED ORAL at 14:00

## 2022-07-25 RX ADMIN — CEPHALEXIN 500 MG: 500 CAPSULE ORAL at 06:26

## 2022-07-25 RX ADMIN — LEVOTHYROXINE SODIUM 88 MCG: 88 TABLET ORAL at 08:08

## 2022-07-25 RX ADMIN — ACETAMINOPHEN 1000 MG: 500 TABLET ORAL at 02:45

## 2022-07-25 RX ADMIN — IBUPROFEN 600 MG: 600 TABLET, FILM COATED ORAL at 09:30

## 2022-07-25 RX ADMIN — ACETAMINOPHEN 1000 MG: 500 TABLET ORAL at 22:15

## 2022-07-25 RX ADMIN — ACETAMINOPHEN 1000 MG: 500 TABLET ORAL at 09:30

## 2022-07-25 RX ADMIN — ACETAMINOPHEN 1000 MG: 500 TABLET ORAL at 15:54

## 2022-07-25 RX ADMIN — ENOXAPARIN SODIUM 80 MG: 100 INJECTION SUBCUTANEOUS at 09:30

## 2022-07-25 RX ADMIN — SODIUM CHLORIDE, PRESERVATIVE FREE 10 ML: 5 INJECTION INTRAVENOUS at 09:36

## 2022-07-25 RX ADMIN — Medication 1 TABLET: at 09:30

## 2022-07-25 RX ADMIN — IBUPROFEN 600 MG: 600 TABLET, FILM COATED ORAL at 02:44

## 2022-07-25 RX ADMIN — POLYETHYLENE GLYCOL 3350 17 G: 17 POWDER, FOR SOLUTION ORAL at 09:30

## 2022-07-25 RX ADMIN — IBUPROFEN 600 MG: 600 TABLET, FILM COATED ORAL at 15:54

## 2022-07-25 RX ADMIN — CEPHALEXIN 500 MG: 500 CAPSULE ORAL at 22:16

## 2022-07-25 ASSESSMENT — PAIN SCALES - GENERAL
PAINLEVEL_OUTOF10: 4
PAINLEVEL_OUTOF10: 3
PAINLEVEL_OUTOF10: 6
PAINLEVEL_OUTOF10: 4
PAINLEVEL_OUTOF10: 0
PAINLEVEL_OUTOF10: 3

## 2022-07-25 ASSESSMENT — PAIN DESCRIPTION - ONSET: ONSET: ON-GOING

## 2022-07-25 ASSESSMENT — PAIN DESCRIPTION - DESCRIPTORS
DESCRIPTORS: TENDER;SORE;ACHING
DESCRIPTORS: SORE;TENDER

## 2022-07-25 ASSESSMENT — PAIN DESCRIPTION - ORIENTATION: ORIENTATION: MID;LOWER

## 2022-07-25 ASSESSMENT — PAIN DESCRIPTION - LOCATION
LOCATION: INCISION
LOCATION: INCISION

## 2022-07-25 ASSESSMENT — PAIN DESCRIPTION - FREQUENCY: FREQUENCY: INTERMITTENT

## 2022-07-25 ASSESSMENT — PAIN - FUNCTIONAL ASSESSMENT: PAIN_FUNCTIONAL_ASSESSMENT: ACTIVITIES ARE NOT PREVENTED

## 2022-07-25 NOTE — PROGRESS NOTES
POST OPERATIVE DAY # 1    Yamileth Juárez is a 29 y.o. female   This patient was seen and examined today. Her pregnancy was complicated by:   Patient Active Problem List   Diagnosis    PCOS    Anxiety    GERD    Hypothyroid    Elv Early 1 hr GTT, Needs Early 3 hr ordered    Hypothyroid in pregnancy, antepartum    High-risk pregnancy    Obesity in pregnancy    Need for diphtheria-tetanus-pertussis (Tdap) vaccine    COVID-19 vaccine administered    Elv BP x2    Proteinuria    39 weeks gestation of pregnancy    High-risk pregnancy, unspecified trimester    Preeclampsia w/o SF's (G1)    Full-term premature rupture of membranes with onset of labor within 24 hours of rupture    Fetal intolerance to labor, delivered, current hospitalization    PLTCS 7/24/22 Apg 8/9 Wt 7#11       Today she is doing well without any chief complaint. Her lochia is light. She denies chest pain, shortness of breath, headache, lightheadedness, and blurred vision. She is  breast feeding and she denies any signs or symptoms of mastitis. She is ambulating well. She is voiding without difficulty. She currently denies S/S of postpartum depression. Flatus present. Bowel movement absent. She is tolerating solids.     Vital Signs:  Vitals:    07/24/22 1529 07/24/22 2045 07/25/22 0000 07/25/22 0402   BP: (!) 116/59 110/61 106/63 103/65   Pulse: 52 70 66 64   Resp: 18 20 15 16   Temp: 98.1 °F (36.7 °C) 97.9 °F (36.6 °C) 97.5 °F (36.4 °C) 97.6 °F (36.4 °C)   TempSrc: Oral Oral     SpO2:  95% 97% 97%         Physical Exam:  General:  no apparent distress, alert, and cooperative  Neurologic:  alert, oriented, normal speech, no focal findings or movement disorder noted  Lungs:  No increased work of breathing, good air exchange, clear to auscultation bilaterally, no crackles or wheezing  Heart:  regular rate and rhythm    Abdomen: abdomen soft, non-distended, non-tender  Fundus: non-tender, normal size, firm  Incision: Prevena in place with good seal  Extremities:  no calf tenderness, non edematous    Labs:  Lab Results   Component Value Date    WBC 9.5 2022    HGB 11.4 (L) 2022    HCT 35.5 (L) 2022    MCV 81.8 (L) 2022     2022       Assessment/Plan:  Klaus Licea is a  POD # 1 s/p PLTCS   - Doing well, BP largely normotensive   - female infant in [address]   - Encourage ambulation and use of incentive spirometer   - D/C sands catheter and saline lock IV on POD #1    - CBC if symptomatic  Rh positive/Rubella immune  Breast feeding    - s/s of mastitis reviewed   - Denies s/s at this time  PreE w/o SF's (new dx)   - BP normotensive   - Denies s/s of PreE   - PreE labs wnl, P/C 0.32 ()   - Follow up one week outpatient for BP check  Hypothyroidism   - Patient on synthroid 88 mg daily (prepregnancy dose)   - TSH 0.64 on 22   - Clinically asymptomatic   - Follow up outpatient  PCOS  - Asymptomatic at this time, previously on metformin  Anxiety  - Mood stable on no meds  Continue post-op care. BMI 49    Counseling Completed:  Secondary Smoke risks and Sudden Infant Death Syndrome were reviewed with recommendations. Infant sleeping, \"back to sleep\" and avoidance of co-sleeping recommendations were reviewed. Signs and Symptoms of Post Partum Depression were reviewed. The patient is to call if any occur. Signs and symptoms of Mastitis were reviewed. The patient is to call if any occur for follow up. Discharge instructions including pelvic rest, incision care, 15 lb weight restriction, no driving with pain medicine and office follow-up were reviewed with patient     Attending Physician: Dr. Cuba Suarez MD  Ob/Gyn Resident PGY1  2022, 6:17 AM     Resident Physician Statement  I have discussed the case, including pertinent history and exam findings with the above resident. I have personally seen the patient. I agree with the assessment, plan and orders as documented.  I have made changes to the above note as needed. I have discussed the case with above named attending. Roz Murdock DO  OB/GYN Resident PGY 4  2022  6:56 AM    Date: 2022  Time: 10:35 AM      Patient Name: Kristyn Conklin  Patient : 1990  Room/Bed: 0477/4424-80  Admission Date/Time: 2022  7:12 PM        Attending Physician Statement  I have personally seen, evaluated and discussed the care of Kristyn Conklin, including pertinent history and exam findings with the resident. I have reviewed and edited their note in the electronic medical record. The key elements of all parts of the encounter have been performed/reviewed by me. I agree with the assessment, plan and orders as documented by the resident. The level of care submitted represents to the best of my ability the care documented in the medical record today. GC Modifier. This service has been performed in part by a resident under the direction of a teaching physician. Attending's Name:  Saige Garg MD      Patient doing well. She has no concerns or complaints. Continue routine post-partum care. Will continue to monitor for signs of anemia. Low threshold to repeat labs if needed. Her hemoglobin started at 11.4 AM POD#1 8.5. She is asymptomatic currently.

## 2022-07-25 NOTE — LACTATION NOTE
Mom requested assistance with the feeding. She reported that the baby just fed for 5 minutes. Baby was sleeping. Encouraged skin to skin and for mom to all out when baby alerts for the next feeding.

## 2022-07-25 NOTE — LACTATION NOTE
Mom had baby on the left breast in football hold, using a nipple shield. Phineas Six baby in closer for a deeper latch. Baby fed well. Reviewed feeding patterns. Encouraged mom to call out for assistance as needed.

## 2022-07-25 NOTE — PLAN OF CARE
Problem: Postpartum  Goal: Experiences normal postpartum course  Description:  Postpartum OB-Pregnancy care plan goal which identifies if the mother is experiencing a normal postpartum course  Outcome: Progressing  Goal: Appropriate maternal -  bonding  Description:  Postpartum OB-Pregnancy care plan goal which identifies if the mother and  are bonding appropriately  Outcome: Progressing  Goal: Establishment of infant feeding pattern  Description:  Postpartum OB-Pregnancy care plan goal which identifies if the mother is establishing a feeding pattern with their   Outcome: Progressing  Goal: Incisions, wounds, or drain sites healing without S/S of infection  Outcome: Progressing     Problem: Pain  Goal: Verbalizes/displays adequate comfort level or baseline comfort level  Outcome: Progressing     Problem: Infection - Adult  Goal: Absence of infection at discharge  Outcome: Progressing  Goal: Absence of infection during hospitalization  Outcome: Progressing  Goal: Absence of fever/infection during anticipated neutropenic period  Outcome: Progressing     Problem: Safety - Adult  Goal: Free from fall injury  Outcome: Progressing     Problem: Discharge Planning  Goal: Discharge to home or other facility with appropriate resources  Outcome: Progressing     Problem: Chronic Conditions and Co-morbidities  Goal: Patient's chronic conditions and co-morbidity symptoms are monitored and maintained or improved  Outcome: Progressing     Problem: Vaginal Birth or  Section  Goal: Fetal and maternal status remain reassuring during the birth process  Description:  Birth OB-Pregnancy care plan goal which identifies if the fetal and maternal status remain reassuring during the birth process  Outcome: Completed

## 2022-07-26 VITALS
TEMPERATURE: 98.5 F | SYSTOLIC BLOOD PRESSURE: 120 MMHG | RESPIRATION RATE: 16 BRPM | OXYGEN SATURATION: 98 % | DIASTOLIC BLOOD PRESSURE: 84 MMHG | HEART RATE: 68 BPM

## 2022-07-26 LAB — SURGICAL PATHOLOGY REPORT: NORMAL

## 2022-07-26 PROCEDURE — 6360000002 HC RX W HCPCS: Performed by: STUDENT IN AN ORGANIZED HEALTH CARE EDUCATION/TRAINING PROGRAM

## 2022-07-26 PROCEDURE — 6370000000 HC RX 637 (ALT 250 FOR IP): Performed by: STUDENT IN AN ORGANIZED HEALTH CARE EDUCATION/TRAINING PROGRAM

## 2022-07-26 RX ADMIN — DOCUSATE SODIUM 100 MG: 100 CAPSULE ORAL at 09:13

## 2022-07-26 RX ADMIN — CEPHALEXIN 500 MG: 500 CAPSULE ORAL at 05:31

## 2022-07-26 RX ADMIN — ENOXAPARIN SODIUM 80 MG: 100 INJECTION SUBCUTANEOUS at 09:13

## 2022-07-26 RX ADMIN — CEPHALEXIN 500 MG: 500 CAPSULE ORAL at 16:13

## 2022-07-26 RX ADMIN — IBUPROFEN 600 MG: 600 TABLET, FILM COATED ORAL at 12:56

## 2022-07-26 RX ADMIN — METRONIDAZOLE 500 MG: 500 TABLET, FILM COATED ORAL at 16:13

## 2022-07-26 RX ADMIN — ACETAMINOPHEN 1000 MG: 500 TABLET ORAL at 12:56

## 2022-07-26 RX ADMIN — Medication 1 TABLET: at 09:13

## 2022-07-26 RX ADMIN — METRONIDAZOLE 500 MG: 500 TABLET, FILM COATED ORAL at 05:31

## 2022-07-26 RX ADMIN — IBUPROFEN 600 MG: 600 TABLET, FILM COATED ORAL at 05:30

## 2022-07-26 RX ADMIN — ACETAMINOPHEN 1000 MG: 500 TABLET ORAL at 05:31

## 2022-07-26 RX ADMIN — POLYETHYLENE GLYCOL 3350 17 G: 17 POWDER, FOR SOLUTION ORAL at 09:13

## 2022-07-26 RX ADMIN — LEVOTHYROXINE SODIUM 88 MCG: 88 TABLET ORAL at 09:13

## 2022-07-26 ASSESSMENT — PAIN - FUNCTIONAL ASSESSMENT: PAIN_FUNCTIONAL_ASSESSMENT: ACTIVITIES ARE NOT PREVENTED

## 2022-07-26 ASSESSMENT — PAIN DESCRIPTION - LOCATION
LOCATION: ABDOMEN
LOCATION: INCISION

## 2022-07-26 ASSESSMENT — PAIN SCALES - GENERAL
PAINLEVEL_OUTOF10: 4
PAINLEVEL_OUTOF10: 3

## 2022-07-26 ASSESSMENT — PAIN DESCRIPTION - DESCRIPTORS
DESCRIPTORS: ACHING;CRAMPING;DISCOMFORT
DESCRIPTORS: SORE;TENDER

## 2022-07-26 ASSESSMENT — PAIN DESCRIPTION - FREQUENCY: FREQUENCY: INTERMITTENT

## 2022-07-26 ASSESSMENT — PAIN DESCRIPTION - PAIN TYPE: TYPE: SURGICAL PAIN

## 2022-07-26 NOTE — PROGRESS NOTES
POST OPERATIVE DAY # 2    Yamileth Juárez is a 29 y.o. female   This patient was seen and examined today. Her pregnancy was complicated by:   Patient Active Problem List   Diagnosis    PCOS    Anxiety    GERD    Hypothyroid    Elv Early 1 hr GTT, Needs Early 3 hr ordered    Hypothyroid in pregnancy, antepartum    High-risk pregnancy    Need for diphtheria-tetanus-pertussis (Tdap) vaccine    COVID-19 vaccine administered    Preeclampsia w/o SF's (G1)    Full-term premature rupture of membranes with onset of labor within 24 hours of rupture    Fetal intolerance to labor, delivered, current hospitalization    PLTCS 7/24/22 Apg 8/9 Wt 7#11    Anemia    Postoperative state       Today she is doing well without any chief complaint. Her lochia is light. She denies chest pain, shortness of breath, headache, lightheadedness, and blurred vision. She is  breast feeding and she denies any signs or symptoms of mastitis. She is ambulating well. She is voiding without difficulty. She currently denies S/S of postpartum depression. Flatus present. Bowel movement present. She is tolerating solids.     Vital Signs:  Vitals:    07/25/22 1245 07/25/22 1600 07/25/22 2030 07/26/22 0529   BP: 110/69 126/78 112/73 118/70   Pulse: 68 79 71 56   Resp: 16 16 16 16   Temp: 97.9 °F (36.6 °C) 98.3 °F (36.8 °C) 98 °F (36.7 °C) 98.2 °F (36.8 °C)   TempSrc: Oral Oral Oral Oral   SpO2:   99% 98%        Physical Exam:  General:  no apparent distress, alert, and cooperative  Neurologic:  alert, oriented, normal speech, no focal findings or movement disorder noted  Lungs:  No increased work of breathing, good air exchange, clear to auscultation bilaterally, no crackles or wheezing  Heart:  regular rate and rhythm    Abdomen: abdomen soft, non-distended, non-tender  Fundus: non-tender, normal size, firm  Incision: Prevena in place with good seal  Extremities:  no calf tenderness, non edematous    Labs:  Lab Results   Component Value Date    WBC 9.3 2022    HGB 8.5 (L) 2022    HCT 26.9 (L) 2022    MCV 84.1 2022     2022       Assessment/Plan:  Kumar Hurtado is a  POD # 2 s/p PLTCS   - Doing well, VSS    - female infant in [address]   - Encourage ambulation and use of incentive spirometer   - CBC if symptomatic  Rh positive/Rubella immune  Breast feeding   - s/s of mastitis reviewed  - Denies s/s at this time  PreE w/o SF (new dx)   - BP normotensive   - Denies s/s of PreE   - PreE labs wnl, P/C 0.32 ()   - Follow up outpatient for BP check (appt scheduled )  Hypothyroidism   - Patient on synthroid 88 mg daily (prepregnancy dose)   - TSH 0.64 on 22   - Clinically asymptomatic   - F/u outpatient  PCOS  - Asymptomatic at this time, previously on metformin  Anxiety   - Mood stable on no meds  Continue post-op care. BMI 49    Counseling Completed:  Secondary Smoke risks and Sudden Infant Death Syndrome were reviewed with recommendations. Infant sleeping, \"back to sleep\" and avoidance of co-sleeping recommendations were reviewed. Signs and Symptoms of Post Partum Depression were reviewed. The patient is to call if any occur. Signs and symptoms of Mastitis were reviewed. The patient is to call if any occur for follow up. Discharge instructions including pelvic rest, incision care, 15 lb weight restriction, no driving with pain medicine and office follow-up were reviewed with patient     Attending Physician: Dr. Angeline Aguirre MD  Ob/Gyn Resident PGY1  2022, 6:26 AM     Resident Physician Statement  I have discussed the case, including pertinent history and exam findings with the above resident. I have personally seen the patient. I agree with the assessment, plan and orders as documented. I have made changes to the above note as needed. I have discussed the case with above named attending. All discharge instructions reviewed with the patient.  Patient is in agreement with plan. All questions answered. Marizta Hollins DO  OB/GYN Resident PGY4   7/26/2022  7:03 AM   I have discussed the care of Radha Armando, including pertinent history and exam findings, with the resident. I have seen and examined the patient and the key elements of all parts of the encounter have been performed by me. I agree with the assessment, plan and orders as documented by the resident.     Delta Saab MD  Attending Physician

## 2022-07-26 NOTE — PLAN OF CARE
Problem: Postpartum  Goal: Experiences normal postpartum course  Description:  Postpartum OB-Pregnancy care plan goal which identifies if the mother is experiencing a normal postpartum course  Outcome: Progressing Towards Goal  Goal: Appropriate maternal -  bonding  Description:  Postpartum OB-Pregnancy care plan goal which identifies if the mother and  are bonding appropriately  Outcome: Progressing Towards Goal  Goal: Establishment of infant feeding pattern  Description:  Postpartum OB-Pregnancy care plan goal which identifies if the mother is establishing a feeding pattern with their   Outcome: Progressing Towards Goal  Goal: Incisions, wounds, or drain sites healing without S/S of infection  Outcome: Progressing Towards Goal     Problem: Pain  Goal: Verbalizes/displays adequate comfort level or baseline comfort level  Outcome: Progressing Towards Goal     Problem: Infection - Adult  Goal: Absence of infection at discharge  Outcome: Progressing Towards Goal  Goal: Absence of infection during hospitalization  Outcome: Progressing Towards Goal  Goal: Absence of fever/infection during anticipated neutropenic period  Outcome: Progressing Towards Goal     Problem: Safety - Adult  Goal: Free from fall injury  Outcome: Progressing Towards Goal     Problem: Discharge Planning  Goal: Discharge to home or other facility with appropriate resources  Outcome: Progressing Towards Goal     Problem: Chronic Conditions and Co-morbidities  Goal: Patient's chronic conditions and co-morbidity symptoms are monitored and maintained or improved  Outcome: Progressing Towards Goal

## 2022-07-26 NOTE — LACTATION NOTE
Baby feeding well with aide of nipple shield. Mom without complaints. Reviewed discharge instructions and LC follow up if she needs further assistance.

## 2022-07-26 NOTE — DISCHARGE INSTRUCTIONS
Follow-up with your OB doctor in 1 week or as specified by your physician. Please refer to A New Beginning-Your Personal Guide to Postpartum Care book provided in your room. Please take this book home with you to refer to. For Breastfeeding moms, you can contact our lactation specialist,  with any problems or questions you may have. Phone number [phone]. Feel free to leave voice mail and your call will be returned as soon as possible. DIET  Eat a well balanced diet focusing on foods high in fiber and protein. Drink 8-10 glasses of fluids daily, especially water. To avoid constipation you may take a mild stool softener as recommended by your doctor or midwife. If taking narcotics, they may constipate you. ACTIVITY  Gradually increase your activity. Resume exercise regimen only after advise by your doctor or midwife. Avoid lifting anything heavier than your baby or a gallon of milk for SIX weeks. Avoid driving 1 week for vaginal delivery and 2 weeks for  section, unless otherwise instructed by physician or if taking any pain medications. Rise slowly from a lying to sitting and then a standing position. Climb stairs carefully. Use caution when carrying your baby up and down the stairs. NO SEXUAL Activity for 6 weeks or until advised by your doctor; Nothing in vagina: intercourse, tampons, or douching. No swimming or hot tubs. Be prepared to discuss family planning at your follow-up OB visit. You may feel tired or have a lack of energy. You may continue your prenatal vitamin to replenish nutrients post delivery. Nap when baby naps to catch up on sleep. EMOTIONS  You may feel herrera, sad, teary, & overwhelmed for the first 2 weeks postpartum. Contact your OB provider if you feel you may be showing signs of postpartum depression, or have thoughts of harming yourself or your infant.   If infant will not stop crying, contact another adult for help or place infant in their crib on their back and take a break. NEVER shake your infant. BLEEDING  Vaginal bleeding will decrease in amount over the next few weeks. You will notice that as your activity increases, your flow may increase. This is your body's way of telling you, you need take things easier and rest more often. Call your OB/ER if you are saturating one maxi pad in an hour & passing large clots. BREAST CARE  Take medications as recommended by your doctor or midwife for pain  If you develop a warm, red, tender area on your breast or develop a fever contact your lactation consultant. For breastfeeding moms:  If you become engorged, feeding may be more difficult or painful for 1-2 days. You may find it helpful to hand express some milk so that the infant can latch on more easily. While breastfeeding, continue to take your prenatal vitamins as directed by your doctor or midwife. Refer to the breastfeeding booklet in the  folder/binder for more information. For any questions or concerns contact a Lactation Consultant. Leave a message and your call will be returned. For NON-breastfeeding moms:  You may apply ice packs to your breasts over you bra for twenty minutes at a time for comfort. Cabbage leaves may be applied to breasts, replace when wilted. Avoid stimulation to your breasts, when showering allow the water to strike your back not your breasts. Do not express milk or your body will make more. Wear a good fitting bra until your milk dries, such as a sports bra. INCISIONAL CARE / ELE CARE  Shower daily, cleansing incision and perineum with mild soap. After shower pat the incision area dry and allow the area open to air. To keep the incision dry, and if necessary, you may dry it with a hair dryer on the cool setting. If used, Steri-stipes should fall off by 2 weeks. If not please remove them when you are in the shower. Do not briskly pull at strips.   If used, Staples should be removed by the OB office by 1 week. If used/ordered, an abdominal binder may provide support for your incision. Use the rafita-bottle until bleeding stops each time you use the restroom. Cleanse your perineum from front to back. If used, stitches will dissolve in 4-6 weeks. You may use a sitz bath or soak in a clean tub with drain open and water running for comfort. Kegel exercises will help restore bladder control. SWELLING  Try to keep your legs elevated when you are sitting. When lying down keep your legs elevated. CALL THE DOCTOR WITH THESE HEALTH CONCERNS OR PROBLEMS  If you have a temp of 100.4 or more. If your bleeding has increased and you are saturating a pad in an hour. Your abdomen is tender to touch. You are passing blood clots bigger than the size of a lemon. If you are experiencing extreme weakness or dizziness. If you are having flu-like symptoms such as achy muscles or joints. There is a foul smell or a green color to your vaginal bleeding. If you have pain that cannot be relieved. You have persistent burning with urination or frequency. Call if you have concerns about your well-being. You are unable to sleep, eat, or are having thoughts of harming yourself or your baby. You have swelling, bleeding, drainage, foul odor, redness, or warmth in/around your incision or stitches. You have a red, warm, tender area in you calf.

## 2022-07-26 NOTE — LACTATION NOTE
This note was copied from a baby's chart. Blood sugar checked per parent's request, 62. Baby awake, still not showing hunger cues but due to eat. MOB has baby placed in football hold on left side, using nipple shield and using syringe of formula to assist and to try to encourage baby to eat. RN encourages breast feed attempts every 2-3 hours. Baby overall just seems sleepy and not interested in feeds at this time.

## 2022-07-26 NOTE — PLAN OF CARE
Problem: Postpartum  Goal: Experiences normal postpartum course  Description:  Postpartum OB-Pregnancy care plan goal which identifies if the mother is experiencing a normal postpartum course  Outcome: Resolved/Met  Goal: Appropriate maternal -  bonding  Description:  Postpartum OB-Pregnancy care plan goal which identifies if the mother and  are bonding appropriately  Outcome: Resolved/Met  Goal: Establishment of infant feeding pattern  Description:  Postpartum OB-Pregnancy care plan goal which identifies if the mother is establishing a feeding pattern with their   Outcome: Resolved/Met  Goal: Incisions, wounds, or drain sites healing without S/S of infection  Outcome: Resolved/Met     Problem: Pain  Goal: Verbalizes/displays adequate comfort level or baseline comfort level  Outcome: Resolved/Met     Problem: Infection - Adult  Goal: Absence of infection at discharge  Outcome: Resolved/Met  Goal: Absence of infection during hospitalization  Outcome: Resolved/Met  Goal: Absence of fever/infection during anticipated neutropenic period  Outcome: Resolved/Met     Problem: Safety - Adult  Goal: Free from fall injury  Outcome: Resolved/Met     Problem: Discharge Planning  Goal: Discharge to home or other facility with appropriate resources  Outcome: Resolved/Met     Problem: Chronic Conditions and Co-morbidities  Goal: Patient's chronic conditions and co-morbidity symptoms are monitored and maintained or improved  Outcome: Resolved/Met

## 2022-07-26 NOTE — FLOWSHEET NOTE
Pt discharged to private residence with personal belongings. Pt transported via wheelchair with  in arms.

## 2022-07-29 ENCOUNTER — NURSE ONLY (OUTPATIENT)
Dept: OBGYN CLINIC | Age: 32
End: 2022-07-29

## 2022-07-29 VITALS — SYSTOLIC BLOOD PRESSURE: 122 MMHG | DIASTOLIC BLOOD PRESSURE: 72 MMHG

## 2022-07-29 NOTE — PROGRESS NOTES
Patient here for dressing removal- area cleaned with peroxide and steri strips placed. BP checked 122/722- no concerns. Instructed to scheduled appt for next week for post partum check.

## 2022-08-07 SDOH — HEALTH STABILITY: PHYSICAL HEALTH: ON AVERAGE, HOW MANY DAYS PER WEEK DO YOU ENGAGE IN MODERATE TO STRENUOUS EXERCISE (LIKE A BRISK WALK)?: 2 DAYS

## 2022-08-07 SDOH — HEALTH STABILITY: PHYSICAL HEALTH: ON AVERAGE, HOW MANY MINUTES DO YOU ENGAGE IN EXERCISE AT THIS LEVEL?: 30 MIN

## 2022-08-10 ENCOUNTER — HOSPITAL ENCOUNTER (OUTPATIENT)
Age: 32
Setting detail: SPECIMEN
Discharge: HOME | End: 2022-08-10

## 2022-08-10 ENCOUNTER — OFFICE VISIT (OUTPATIENT)
Dept: PRIMARY CARE CLINIC | Age: 32
End: 2022-08-10
Payer: COMMERCIAL

## 2022-08-10 VITALS
BODY MASS INDEX: 45.03 KG/M2 | DIASTOLIC BLOOD PRESSURE: 82 MMHG | WEIGHT: 280.2 LBS | SYSTOLIC BLOOD PRESSURE: 124 MMHG | HEART RATE: 76 BPM | OXYGEN SATURATION: 98 % | RESPIRATION RATE: 16 BRPM | HEIGHT: 66 IN

## 2022-08-10 DIAGNOSIS — G43.009 MIGRAINE WITHOUT AURA AND WITHOUT STATUS MIGRAINOSUS, NOT INTRACTABLE: ICD-10-CM

## 2022-08-10 DIAGNOSIS — R53.82 CHRONIC FATIGUE: ICD-10-CM

## 2022-08-10 DIAGNOSIS — E55.9 VITAMIN D DEFICIENCY: ICD-10-CM

## 2022-08-10 DIAGNOSIS — E66.01 CLASS 3 SEVERE OBESITY DUE TO EXCESS CALORIES WITHOUT SERIOUS COMORBIDITY WITH BODY MASS INDEX (BMI) OF 45.0 TO 49.9 IN ADULT (HCC): ICD-10-CM

## 2022-08-10 DIAGNOSIS — R73.09 IMPAIRED GLUCOSE METABOLISM: ICD-10-CM

## 2022-08-10 DIAGNOSIS — E28.2 PCOS (POLYCYSTIC OVARIAN SYNDROME): ICD-10-CM

## 2022-08-10 DIAGNOSIS — E03.8 OTHER SPECIFIED HYPOTHYROIDISM: Primary | ICD-10-CM

## 2022-08-10 PROBLEM — E03.9 HYPOTHYROID IN PREGNANCY, ANTEPARTUM: Status: RESOLVED | Noted: 2022-04-21 | Resolved: 2022-08-10

## 2022-08-10 PROBLEM — E66.813 CLASS 3 SEVERE OBESITY DUE TO EXCESS CALORIES WITHOUT SERIOUS COMORBIDITY WITH BODY MASS INDEX (BMI) OF 45.0 TO 49.9 IN ADULT: Status: ACTIVE | Noted: 2022-04-21

## 2022-08-10 PROBLEM — O99.280 HYPOTHYROID IN PREGNANCY, ANTEPARTUM: Status: RESOLVED | Noted: 2022-04-21 | Resolved: 2022-08-10

## 2022-08-10 LAB
ABSOLUTE EOS #: 0.07 K/UL (ref 0–0.44)
ABSOLUTE IMMATURE GRANULOCYTE: <0.03 K/UL (ref 0–0.3)
ABSOLUTE LYMPH #: 1.75 K/UL (ref 1.1–3.7)
ABSOLUTE MONO #: 0.46 K/UL (ref 0.1–1.2)
BASOPHILS # BLD: 1 % (ref 0–2)
BASOPHILS ABSOLUTE: 0.04 K/UL (ref 0–0.2)
EOSINOPHILS RELATIVE PERCENT: 1 % (ref 1–4)
HCT VFR BLD CALC: 43 % (ref 36.3–47.1)
HCT VFR BLD CALC: 43 % (ref 36.3–47.1)
HEMOGLOBIN: 13.2 G/DL (ref 11.9–15.1)
HEMOGLOBIN: 13.2 G/DL (ref 11.9–15.1)
IMMATURE GRANULOCYTES: 0 %
LYMPHOCYTES # BLD: 25 % (ref 24–43)
MCH RBC QN AUTO: 26.9 PG (ref 25.2–33.5)
MCHC RBC AUTO-ENTMCNC: 30.7 G/DL (ref 28.4–34.8)
MCV RBC AUTO: 87.6 FL (ref 82.6–102.9)
MONOCYTES # BLD: 7 % (ref 3–12)
NRBC AUTOMATED: 0 PER 100 WBC
PDW BLD-RTO: 15.9 % (ref 11.8–14.4)
PLATELET # BLD: 452 K/UL (ref 138–453)
PMV BLD AUTO: 10.1 FL (ref 8.1–13.5)
RBC # BLD: 4.91 M/UL (ref 3.95–5.11)
RBC # BLD: ABNORMAL 10*6/UL
SEG NEUTROPHILS: 66 % (ref 36–65)
SEGMENTED NEUTROPHILS ABSOLUTE COUNT: 4.72 K/UL (ref 1.5–8.1)
WBC # BLD: 7.1 K/UL (ref 3.5–11.3)

## 2022-08-10 PROCEDURE — 99204 OFFICE O/P NEW MOD 45 MIN: CPT | Performed by: STUDENT IN AN ORGANIZED HEALTH CARE EDUCATION/TRAINING PROGRAM

## 2022-08-10 RX ORDER — METFORMIN HYDROCHLORIDE 500 MG/1
TABLET, EXTENDED RELEASE ORAL
Qty: 120 TABLET | Refills: 0 | Status: SHIPPED | OUTPATIENT
Start: 2022-08-10 | End: 2022-09-06

## 2022-08-10 RX ORDER — LEVOTHYROXINE SODIUM 88 UG/1
TABLET ORAL
Qty: 60 TABLET | Refills: 0 | Status: SHIPPED | OUTPATIENT
Start: 2022-08-10 | End: 2022-10-05 | Stop reason: SDUPTHER

## 2022-08-10 RX ORDER — METFORMIN HYDROCHLORIDE 500 MG/1
TABLET, EXTENDED RELEASE ORAL
Qty: 120 TABLET | Refills: 2 | Status: CANCELLED | OUTPATIENT
Start: 2022-08-10

## 2022-08-10 RX ORDER — SUMATRIPTAN 50 MG/1
50 TABLET, FILM COATED ORAL DAILY PRN
Qty: 60 TABLET | Refills: 0 | Status: SHIPPED | OUTPATIENT
Start: 2022-08-10 | End: 2022-10-09

## 2022-08-10 SDOH — ECONOMIC STABILITY: FOOD INSECURITY: WITHIN THE PAST 12 MONTHS, YOU WORRIED THAT YOUR FOOD WOULD RUN OUT BEFORE YOU GOT MONEY TO BUY MORE.: NEVER TRUE

## 2022-08-10 SDOH — ECONOMIC STABILITY: FOOD INSECURITY: WITHIN THE PAST 12 MONTHS, THE FOOD YOU BOUGHT JUST DIDN'T LAST AND YOU DIDN'T HAVE MONEY TO GET MORE.: NEVER TRUE

## 2022-08-10 ASSESSMENT — PATIENT HEALTH QUESTIONNAIRE - PHQ9
SUM OF ALL RESPONSES TO PHQ QUESTIONS 1-9: 0
SUM OF ALL RESPONSES TO PHQ QUESTIONS 1-9: 0
SUM OF ALL RESPONSES TO PHQ9 QUESTIONS 1 & 2: 0
SUM OF ALL RESPONSES TO PHQ QUESTIONS 1-9: 0
1. LITTLE INTEREST OR PLEASURE IN DOING THINGS: 0
SUM OF ALL RESPONSES TO PHQ QUESTIONS 1-9: 0
2. FEELING DOWN, DEPRESSED OR HOPELESS: 0

## 2022-08-10 ASSESSMENT — SOCIAL DETERMINANTS OF HEALTH (SDOH): HOW HARD IS IT FOR YOU TO PAY FOR THE VERY BASICS LIKE FOOD, HOUSING, MEDICAL CARE, AND HEATING?: NOT HARD AT ALL

## 2022-08-10 ASSESSMENT — ENCOUNTER SYMPTOMS
ABDOMINAL PAIN: 0
COUGH: 0
SHORTNESS OF BREATH: 0

## 2022-08-10 NOTE — PROGRESS NOTES
[address] [place] [address] [address]  [address] [address]  Dept: [phone]  Dept Fax: [phone]    Forrest Jang is a 29 y.o. female who presents today for her medical conditions/complaints as noted below. Chief Complaint   Patient presents with    Migraine    Hypothyroidism       HPI:     HPI    Hemoglobin A1C (%)   Date Value   2021 5.5   2020 5.5   2019 5.6             ( goal A1C is < 7)   No results found for: LABMICR  No results found for: LDLCHOLESTEROL, LDLCALC    (goal LDL is <100)   AST (U/L)   Date Value   2022 16     ALT (U/L)   Date Value   2022 <5 (L)     BUN (mg/dL)   Date Value   2022 10     BP Readings from Last 3 Encounters:   08/10/22 124/82   22 122/72   22 120/84          (goal 120/80)    Past Medical History:   Diagnosis Date    Migraines 2017    Previously on Imitrex but stopped once she found out she was pregnanct    PCOS (polycystic ovarian syndrome)       Past Surgical History:   Procedure Laterality Date     SECTION N/A 2022     SECTION performed by Alan Gama DO at [facility] L[facility] OR    TONSILLECTOMY  2013    WISDOM TOOTH EXTRACTION  2011       History reviewed. No pertinent family history.     Social History     Tobacco Use    Smoking status: Never    Smokeless tobacco: Never   Substance Use Topics    Alcohol use: Not Currently     Comment: occ,      Current Outpatient Medications   Medication Sig Dispense Refill    metFORMIN (GLUCOPHAGE-XR) 500 MG extended release tablet TAKE 4 TABLETS BY MOUTH EVERY DAY WITH BREAKFAST 120 tablet 0    levothyroxine (SYNTHROID) 88 MCG tablet TAKE 1 TABLET BY MOUTH IN THE MORNING IMMEDIATELY UPON ARISING, DELAY EATING/DRINKING FOR 30 MINUTES 60 tablet 0    SUMAtriptan (IMITREX) 50 MG tablet Take 1 tablet by mouth daily as needed for Migraine 60 tablet 0    ibuprofen (ADVIL;MOTRIN) 800 MG tablet Take 1 tablet by mouth every 8 hours as needed for Pain 60 tablet 1    Prenatal Vit-Fe Fumarate-FA (PRENATAL VITAMINS PO) Take 1 tablet by mouth daily       No current facility-administered medications for this visit. No Known Allergies    Health Maintenance   Topic Date Due    Varicella vaccine (1 of 2 - 2-dose childhood series) Never done    Hepatitis B vaccine (2 of 3 - 3-dose primary series) 10/15/2001    COVID-19 Vaccine (3 - Booster for Pfizer series) 09/12/2021    A1C test (Diabetic or Prediabetic)  01/16/2022    Flu vaccine (1) 09/01/2022    Depression Screen  06/23/2023    Cervical cancer screen  07/15/2024    DTaP/Tdap/Td vaccine (2 - Td or Tdap) 05/12/2032    Hepatitis C screen  Completed    HIV screen  Completed    Hepatitis A vaccine  Aged Out    Hib vaccine  Aged Out    Meningococcal (ACWY) vaccine  Aged Out    Pneumococcal 0-64 years Vaccine  Aged Out       Subjective:      Review of Systems   Constitutional:  Negative for fatigue and fever. HENT:  Negative for congestion. Respiratory:  Negative for cough and shortness of breath. Cardiovascular:  Negative for chest pain and palpitations. Gastrointestinal:  Negative for abdominal pain. Genitourinary:  Negative for vaginal bleeding and vaginal discharge. Musculoskeletal:  Negative for arthralgias. Skin:  Negative for rash. Allergic/Immunologic: Negative for environmental allergies. Neurological:  Negative for light-headedness. Hematological:  Negative for adenopathy. Psychiatric/Behavioral:  The patient is not nervous/anxious. Objective:     Physical Exam  Constitutional:       Appearance: Normal appearance. She is obese. HENT:      Head: Normocephalic and atraumatic. Cardiovascular:      Rate and Rhythm: Normal rate and regular rhythm. Pulses: Normal pulses. Heart sounds: Normal heart sounds. No murmur heard. Pulmonary:      Effort: Pulmonary effort is normal.      Breath sounds: Normal breath sounds. No wheezing.    Abdominal: General: Bowel sounds are normal.      Palpations: Abdomen is soft. Tenderness: There is no abdominal tenderness. Musculoskeletal:         General: No swelling. Normal range of motion. Cervical back: Normal range of motion and neck supple. Neurological:      General: No focal deficit present. Mental Status: She is alert and oriented to person, place, and time. Mental status is at baseline. Motor: No weakness. Psychiatric:         Mood and Affect: Mood normal.         Behavior: Behavior normal.     /82   Pulse 76   Resp 16   Ht 5' 6\" (1.676 m)   Wt 280 lb 3.2 oz (127.1 kg)   LMP 10/18/2021   SpO2 98%   BMI 45.23 kg/m²     Assessment:      Diagnoses and all orders for this visit:  Other specified hypothyroidism  -     levothyroxine (SYNTHROID) 88 MCG tablet; TAKE 1 TABLET BY MOUTH IN THE MORNING IMMEDIATELY UPON ARISING, DELAY EATING/DRINKING FOR 30 MINUTES  PCOS (polycystic ovarian syndrome)  -     metFORMIN (GLUCOPHAGE-XR) 500 MG extended release tablet; TAKE 4 TABLETS BY MOUTH EVERY DAY WITH BREAKFAST  Impaired glucose metabolism  -     Hemoglobin A1C; Future  Chronic fatigue  -     Comprehensive Metabolic Panel; Future  -     CBC with Auto Differential; Future  -     TSH with Reflex; Future  -     Vitamin B12 & Folate; Future  Vitamin D deficiency  -     Vitamin D 25 Hydroxy; Future  Migraine without aura and without status migrainosus, not intractable  -     SUMAtriptan (IMITREX) 50 MG tablet; Take 1 tablet by mouth daily as needed for Migraine  Class 3 severe obesity due to excess calories without serious comorbidity with body mass index (BMI) of 45.0 to 49.9 in adult [facility])     Lifestyle changes                 Plan:      Return in about 6 months (around 2/10/2023).     Orders Placed This Encounter   Procedures    Hemoglobin A1C     Standing Status:   Future     Number of Occurrences:   1     Standing Expiration Date:   2/10/2024    Comprehensive Metabolic Panel Standing Status:   Future     Number of Occurrences:   1     Standing Expiration Date:   2/10/2024    CBC with Auto Differential     Standing Status:   Future     Number of Occurrences:   1     Standing Expiration Date:   8/10/2023    TSH with Reflex     Standing Status:   Future     Number of Occurrences:   1     Standing Expiration Date:   8/10/2023    Vitamin B12 & Folate     Standing Status:   Future     Number of Occurrences:   1     Standing Expiration Date:   8/10/2023    Vitamin D 25 Hydroxy     Standing Status:   Future     Number of Occurrences:   1     Standing Expiration Date:   2/10/2024     Orders Placed This Encounter   Medications    metFORMIN (GLUCOPHAGE-XR) 500 MG extended release tablet     Sig: TAKE 4 TABLETS BY MOUTH EVERY DAY WITH BREAKFAST     Dispense:  120 tablet     Refill:  0    levothyroxine (SYNTHROID) 88 MCG tablet     Sig: TAKE 1 TABLET BY MOUTH IN THE MORNING IMMEDIATELY UPON ARISING, DELAY EATING/DRINKING FOR 30 MINUTES     Dispense:  60 tablet     Refill:  0    SUMAtriptan (IMITREX) 50 MG tablet     Sig: Take 1 tablet by mouth daily as needed for Migraine     Dispense:  60 tablet     Refill:  0         Patient given educational materials - see patient instructions. Discussed use, benefit, and side effects of prescribedmedications. All patient questions answered. Pt voiced understanding. Reviewed health maintenance. Instructed to continue current medications, diet and exercise. Patient agreed with treatment plan. Follow up as directed. I spent a total of 45 minutes face to face with this patient. Over 50% of that time was spent on counseling and care coordination. Please see assessment and plan for details. Electronically signed by   Angelina Meeks MD on 8/10/2022 at 3:39 PM  [facility]. Please note that this chart was generated using voice recognition Dragon dictation software.   Although every effort was made to ensure the accuracy of this automatedtranscription, some errors in transcription may have occurred.

## 2022-08-11 LAB
ALBUMIN SERPL-MCNC: 4.2 G/DL (ref 3.5–5.2)
ALBUMIN/GLOBULIN RATIO: 1.4 (ref 1–2.5)
ALP BLD-CCNC: 96 U/L (ref 35–104)
ALT SERPL-CCNC: 22 U/L (ref 5–33)
ANION GAP SERPL CALCULATED.3IONS-SCNC: 18 MMOL/L (ref 9–17)
AST SERPL-CCNC: 21 U/L
BILIRUB SERPL-MCNC: 0.42 MG/DL (ref 0.3–1.2)
BUN BLDV-MCNC: 9 MG/DL (ref 6–20)
CALCIUM SERPL-MCNC: 9.6 MG/DL (ref 8.6–10.4)
CHLORIDE BLD-SCNC: 101 MMOL/L (ref 98–107)
CO2: 20 MMOL/L (ref 20–31)
CREAT SERPL-MCNC: 0.82 MG/DL (ref 0.5–0.9)
ESTIMATED AVERAGE GLUCOSE: 103 MG/DL
FOLATE: >20 NG/ML
GFR AFRICAN AMERICAN: >60 ML/MIN
GFR NON-AFRICAN AMERICAN: >60 ML/MIN
GFR SERPL CREATININE-BSD FRML MDRD: ABNORMAL ML/MIN/{1.73_M2}
GLUCOSE BLD-MCNC: 89 MG/DL (ref 70–99)
HBA1C MFR BLD: 5.2 % (ref 4–6)
POTASSIUM SERPL-SCNC: 4.4 MMOL/L (ref 3.7–5.3)
SODIUM BLD-SCNC: 139 MMOL/L (ref 135–144)
TOTAL PROTEIN: 7.1 G/DL (ref 6.4–8.3)
TSH SERPL DL<=0.05 MIU/L-ACNC: 0.9 UIU/ML (ref 0.3–5)
VITAMIN B-12: 450 PG/ML (ref 232–1245)
VITAMIN D 25-HYDROXY: 37.8 NG/ML

## 2022-08-12 ENCOUNTER — POSTPARTUM VISIT (OUTPATIENT)
Dept: OBGYN CLINIC | Age: 32
End: 2022-08-12

## 2022-08-12 VITALS — BODY MASS INDEX: 44.71 KG/M2 | DIASTOLIC BLOOD PRESSURE: 62 MMHG | SYSTOLIC BLOOD PRESSURE: 122 MMHG | WEIGHT: 277 LBS

## 2022-08-12 PROCEDURE — 99024 POSTOP FOLLOW-UP VISIT: CPT | Performed by: ADVANCED PRACTICE MIDWIFE

## 2022-08-12 NOTE — PROGRESS NOTES
[address] [facility] [place]  [place]  [address] [address]  Dept: [phone]  Patient Name: Lynnette Armstrong  Patient Age: 29 y.o. Date of Visit: 2022    SUBJECTIVE:    Chief Complaint:  Chief Complaint   Patient presents with    Postpartum Care       HPI:  DELIVERY DATE: 22  TYPE OF DELIVERY: primary  section  PROVIDER:   PERINEUM: n/a    Lion Pill was seen for her 2 week visit. Her Female infant is healthy. She is bottle feeding. She is not experiencing pain. She is rating her pain 0  She reports her lochia is staining only  She reports no perineal discomfort. She denies urinary incontinence. Her bowels have returned to her normal pattern. She is back to her normal activity pattern. Lion Pill has engaged in intercourse. She does not report a mood disorder. She feels she is not having difficulty coping. Lion Pill feels her family adjustment is effective. She reports an overall positive birth experience. In the past 7 days:  I have been able to laugh and see the funny side of things: As much as I always could  I have looked forward with enjoyment to things: As much as I ever did  I have blamed myself unnecessarily when things went wrong: Not very often  I have been anxious or worried for no good reason: Yes, sometimes  I have felt scared or panicky for no good reason: No, not much  I haven't been able to cope lately: No, I have been coping as well as ever  I have been so unhappy that I have had difficulty sleeping: Not at all  I have felt sad or miserable: No, not at all  I have been so unhappy that I have been crying:  Only occasionally  The thought of harming myself has occurred to me: Never  Total: 5      OBJECTIVE:   Wt Readings from Last 3 Encounters:   22 277 lb (125.6 kg)   08/10/22 280 lb 3.2 oz (127.1 kg)   22 (!) 309 lb (140.2 kg)       Blood pressure 122/62, weight 277 lb (125.6 kg), last menstrual period 10/18/2021, not currently breastfeeding. ASSESSMENT/PLAN:  1. PLTCS 7/24/22 Apg 8/9 Wt 7#11  Labs were not ordered. Return to the office in 4 weeks. She will return for 6 week PP visit  bottle feeding  Signs & Symptoms of mastitis reviewed; notify if occurs  Family planning/birth spacing needs  Family planning counseling was initiated/completed. Condoms, NFP reviewed issues with this method and PCOS if anovulation is present  Smoker/non-smoker  Secondary smoke risks were reviewed, including increased risks of respiratory     problems, Sudden Infant Death Syndrome, and potential malignancies. Patient was seen with total face to face time 15 minutes.   More than 50% of this visit was counseling and education regarding Post Partum visit

## 2022-08-18 ENCOUNTER — PATIENT MESSAGE (OUTPATIENT)
Dept: PRIMARY CARE CLINIC | Age: 32
End: 2022-08-18

## 2022-08-18 DIAGNOSIS — R73.09 IMPAIRED GLUCOSE METABOLISM: Primary | ICD-10-CM

## 2022-08-19 NOTE — TELEPHONE ENCOUNTER
I have ordered total insulin and fasting glucose which was done by patient's OB/gyn in 2021 to check for insulin resistance. I have already checked HbA1c which is a marker of diabetes/borderline diabetes.   Thank you

## 2022-08-23 ENCOUNTER — HOSPITAL ENCOUNTER (OUTPATIENT)
Age: 32
Setting detail: SPECIMEN
Discharge: HOME | End: 2022-08-23

## 2022-08-23 DIAGNOSIS — R73.09 IMPAIRED GLUCOSE METABOLISM: ICD-10-CM

## 2022-08-23 LAB
GLUCOSE FASTING: 73 MG/DL (ref 70–99)
INSULIN REFERENCE RANGE:: NORMAL
INSULIN: 8.8 MU/L

## 2022-09-06 DIAGNOSIS — E28.2 PCOS (POLYCYSTIC OVARIAN SYNDROME): ICD-10-CM

## 2022-09-06 RX ORDER — METFORMIN HYDROCHLORIDE 500 MG/1
TABLET, EXTENDED RELEASE ORAL
Qty: 120 TABLET | Refills: 3 | Status: SHIPPED | OUTPATIENT
Start: 2022-09-06

## 2022-09-12 ENCOUNTER — POSTPARTUM VISIT (OUTPATIENT)
Dept: OBGYN CLINIC | Age: 32
End: 2022-09-12

## 2022-09-12 VITALS
BODY MASS INDEX: 43.55 KG/M2 | DIASTOLIC BLOOD PRESSURE: 80 MMHG | HEIGHT: 66 IN | WEIGHT: 271 LBS | SYSTOLIC BLOOD PRESSURE: 110 MMHG

## 2022-09-12 PROCEDURE — 0503F POSTPARTUM CARE VISIT: CPT | Performed by: OBSTETRICS & GYNECOLOGY

## 2022-09-12 NOTE — PROGRESS NOTES
Anitra De La Paz  7:08 PM  22            The patient was seen. She has no chief complaints today. She delivered by  section on 22. She is not breast feeding and there is not any signs or symptoms of mastitis. She does not have any signs or symptoms of post partum depression. She denies any suicidal thoughts with a plan, intent to harm others, and delusional ideas. Today her lochia is light she denies any dizziness or shortness of breath. Her pregnancy was complicated by:   Patient Active Problem List    Diagnosis Date Noted    Migraine without aura and without status migrainosus, not intractable 08/10/2022     Priority: Medium    Chronic fatigue 08/10/2022     Priority: Medium    Vitamin D deficiency 08/10/2022     Priority: Medium    Postoperative state 2022     Priority: Medium    PLTCS 22 Apg 8/9 Wt 7#11 2022     Priority: Medium    Preeclampsia w/o SF's (G1) 2022     Priority: Medium    Need for diphtheria-tetanus-pertussis (Tdap) vaccine 2022     Priority: Medium    COVID-19 vaccine administered 2022     Priority: Medium    Anemia 2022     Overview Note:     Hgb 8.4 ()      High-risk pregnancy 2022     Overview Note:     Imitrex but has not taken since she found out she was pregnant  Stopped taking her Actos once she found out she was pregnant      Class 3 severe obesity due to excess calories without serious comorbidity with body mass index (BMI) of 45.0 to 49.9 in adult ([facility] [address] 2022     Overview Note:     Pre pregnant BMI- 45.67  Growth US  Asa @13 weeks  Early GTT- 133      Hypothyroid 2022    Impaired glucose metabolism 2022     Overview Note:     Early 1 hr GTT: 133  Early 3 hr GTT: **      Anxiety 2017     Overview Note:     No meds      PCOS      Overview Note:     Was previously on metformin      GERD 2011         She does admit to having good home support.  Her bowels are regular and she denies any urinary tract symptomology. OB History    Para Term  AB Living   1 1 1 0 0 1   SAB IAB Ectopic Molar Multiple Live Births   0 0 0 0 0 1           Blood pressure 110/80, height 5' 6\" (1.676 m), weight 271 lb (122.9 kg), not currently breastfeeding. Abdomen: Soft and non-tender; good bowel sounds; no guarding, rebound or rigidity; no CVA tenderness bilaterally. Incision: Clean, Dry and Intact without signs or symptoms of infection. Extremities: No calf tenderness bilaterally. DTR 2/4 bilaterally. No edema. Assessment:   Diagnosis Orders   1. PLTCS 22 Apg 8/9 Wt 7#11        2. Postpartum care following  delivery          Chief Complaint   Patient presents with    Postpartum Care       Plan:  1. Return to the office in  3-4 weeks  2. Signs & Symptoms of mastitis reviewed; notify if occurs  3. Secondary smoke risks reviewed. Increased risks of respiratory problems, Sudden     infant death syndrome, and potential malignancies. 4. Restrictions lifted  5. Family planning counseling and STD counseling completed - counseled and she prefers NFP at this time  6. Questions about TOLAC discussed and answered discussed that given her labor experience that she would likely have a low probability of success but that we can  her and that she could try however there are risk associated. Recommend 18-24 months until pregnancy if she is interested in [facility]. Patient was seen with total face to face time of 20 minutes. More than 50% of this visit was on counseling and education regarding her    Diagnosis Orders   1. PLTCS 22 Apg 8/9 Wt 7#11        2. Postpartum care following  delivery         and her options. She was also counseled on her preventative health maintenance recommendations and follow-up.

## 2022-10-05 DIAGNOSIS — E03.8 OTHER SPECIFIED HYPOTHYROIDISM: ICD-10-CM

## 2022-10-05 RX ORDER — LEVOTHYROXINE SODIUM 88 UG/1
TABLET ORAL
Qty: 90 TABLET | Refills: 3 | Status: SHIPPED | OUTPATIENT
Start: 2022-10-05

## 2022-11-11 ENCOUNTER — HOSPITAL ENCOUNTER (OUTPATIENT)
Age: 32
Setting detail: SPECIMEN
Discharge: HOME | End: 2022-11-11

## 2022-11-11 ENCOUNTER — OFFICE VISIT (OUTPATIENT)
Dept: PRIMARY CARE CLINIC | Age: 32
End: 2022-11-11
Payer: COMMERCIAL

## 2022-11-11 VITALS
SYSTOLIC BLOOD PRESSURE: 120 MMHG | WEIGHT: 267 LBS | HEART RATE: 84 BPM | BODY MASS INDEX: 42.91 KG/M2 | RESPIRATION RATE: 16 BRPM | HEIGHT: 66 IN | DIASTOLIC BLOOD PRESSURE: 80 MMHG | OXYGEN SATURATION: 96 %

## 2022-11-11 DIAGNOSIS — K59.00 CONSTIPATION, UNSPECIFIED CONSTIPATION TYPE: Primary | ICD-10-CM

## 2022-11-11 DIAGNOSIS — K59.00 CONSTIPATION, UNSPECIFIED CONSTIPATION TYPE: ICD-10-CM

## 2022-11-11 LAB — TSH SERPL DL<=0.05 MIU/L-ACNC: 0.85 UIU/ML (ref 0.3–5)

## 2022-11-11 PROCEDURE — 99213 OFFICE O/P EST LOW 20 MIN: CPT | Performed by: STUDENT IN AN ORGANIZED HEALTH CARE EDUCATION/TRAINING PROGRAM

## 2022-11-11 ASSESSMENT — PATIENT HEALTH QUESTIONNAIRE - PHQ9
SUM OF ALL RESPONSES TO PHQ QUESTIONS 1-9: 0
SUM OF ALL RESPONSES TO PHQ QUESTIONS 1-9: 0
2. FEELING DOWN, DEPRESSED OR HOPELESS: 0
SUM OF ALL RESPONSES TO PHQ9 QUESTIONS 1 & 2: 0
1. LITTLE INTEREST OR PLEASURE IN DOING THINGS: 0
SUM OF ALL RESPONSES TO PHQ QUESTIONS 1-9: 0
SUM OF ALL RESPONSES TO PHQ QUESTIONS 1-9: 0

## 2022-11-11 ASSESSMENT — ENCOUNTER SYMPTOMS
SHORTNESS OF BREATH: 0
ABDOMINAL PAIN: 0
CONSTIPATION: 1
COUGH: 0

## 2022-11-11 NOTE — PROGRESS NOTES
[address] [address] [address]  [address] [address]  Dept: [phone]  Dept Fax: [phone]    Hoang Rousseau is a 29 y.o. female who presents today for her medical conditions/complaints as noted below. Chief Complaint   Patient presents with    GI Problem     Noticing when she eat bread or things with gluten, feels like she is going to have diarrhea but then becomes constipated symptoms x 3 months       HPI:     29 y. o. F presented to office for ongoing constipation. She mentioned that when she eats anything with gluten like breads past that she feels sick to the stomach. She gets constipated for few days and then gets better on its own. Denied any associated diarrhea alternating with constipation, no abdominal pain, nausea vomiting or blood in stools. She is 3 months postpartum. Advised patient to increase hydration, modify diet and lifestyle changes. No other current concerns. Vitals are stable. Advised lifestyle changes. Medications reviewed and refilled as appropriate, problem list updated. All concerns discussed in details and all questions answered to patient satisfaction.               Hemoglobin A1C (%)   Date Value   08/10/2022 5.2   2021 5.5   2020 5.5             ( goal A1C is < 7)   No results found for: LABMICR  No results found for: LDLCHOLESTEROL, LDLCALC    (goal LDL is <100)   AST (U/L)   Date Value   08/10/2022 21     ALT (U/L)   Date Value   08/10/2022 22     BUN (mg/dL)   Date Value   08/10/2022 9     BP Readings from Last 3 Encounters:   22 120/80   22 110/80   22 122/62          (goal 120/80)    Past Medical History:   Diagnosis Date    Migraines 2017    Previously on Imitrex but stopped once she found out she was pregnanct    PCOS (polycystic ovarian syndrome)       Past Surgical History:   Procedure Laterality Date     SECTION N/A 2022     [facility]    TONSILLECTOMY  05/2013    WISDOM TOOTH EXTRACTION  12/2011       History reviewed. No pertinent family history. Social History     Tobacco Use    Smoking status: Never    Smokeless tobacco: Never   Substance Use Topics    Alcohol use: Not Currently     Comment: occ,      Current Outpatient Medications   Medication Sig Dispense Refill    levothyroxine (SYNTHROID) 88 MCG tablet TAKE 1 TABLET BY MOUTH IN THE MORNING IMMEDIATELY UPON ARISING, DELAY EATING/DRINKING FOR 30 MINUTES 90 tablet 3    metFORMIN (GLUCOPHAGE-XR) 500 MG extended release tablet TAKE FOUR TABLETS BY MOUTH DAILY WITH BREAKFAST 120 tablet 3    ibuprofen (ADVIL;MOTRIN) 800 MG tablet Take 1 tablet by mouth every 8 hours as needed for Pain 60 tablet 1    Prenatal Vit-Fe Fumarate-FA (PRENATAL VITAMINS PO) Take 1 tablet by mouth daily      SUMAtriptan (IMITREX) 50 MG tablet Take 1 tablet by mouth daily as needed for Migraine 60 tablet 0     No current facility-administered medications for this visit. No Known Allergies    Health Maintenance   Topic Date Due    Varicella vaccine (1 of 2 - 2-dose childhood series) Never done    COVID-19 Vaccine (3 - Booster for Pfizer series) 06/07/2021    Flu vaccine (1) 11/11/2023 (Originally 8/1/2022)    A1C test (Diabetic or Prediabetic)  08/10/2023    Depression Screen  08/10/2023    Cervical cancer screen  07/15/2024    DTaP/Tdap/Td vaccine (2 - Td or Tdap) 05/12/2032    Hepatitis C screen  Completed    HIV screen  Completed    Hepatitis A vaccine  Aged Out    Hib vaccine  Aged Out    Meningococcal (ACWY) vaccine  Aged Out    Pneumococcal 0-64 years Vaccine  Aged Out       Subjective:      Review of Systems   Constitutional:  Negative for fatigue and fever. HENT:  Negative for congestion. Respiratory:  Negative for cough and shortness of breath. Cardiovascular:  Negative for chest pain and palpitations. Gastrointestinal:  Positive for constipation.  Negative for abdominal pain. Genitourinary:  Negative for vaginal bleeding and vaginal discharge. Musculoskeletal:  Negative for arthralgias. Skin:  Negative for rash. Allergic/Immunologic: Negative for environmental allergies. Neurological:  Negative for light-headedness. Hematological:  Negative for adenopathy. Psychiatric/Behavioral:  The patient is not nervous/anxious. Objective:     Physical Exam  Constitutional:       Appearance: Normal appearance. She is obese. HENT:      Head: Normocephalic and atraumatic. Cardiovascular:      Rate and Rhythm: Normal rate and regular rhythm. Pulses: Normal pulses. Heart sounds: Normal heart sounds. No murmur heard. Pulmonary:      Effort: Pulmonary effort is normal.      Breath sounds: Normal breath sounds. No wheezing. Abdominal:      General: Bowel sounds are normal. There is no distension. Palpations: Abdomen is soft. There is no mass. Tenderness: There is no abdominal tenderness. Hernia: No hernia is present. Musculoskeletal:         General: No swelling. Normal range of motion. Cervical back: Normal range of motion and neck supple. Neurological:      General: No focal deficit present. Mental Status: She is alert and oriented to person, place, and time. Mental status is at baseline. Motor: No weakness. Psychiatric:         Mood and Affect: Mood normal.         Behavior: Behavior normal.     /80 (Site: Left Upper Arm, Position: Sitting, Cuff Size: Large Adult)   Pulse 84   Resp 16   Ht 5' 6\" (1.676 m)   Wt 267 lb (121.1 kg)   SpO2 96%   BMI 43.09 kg/m²     Assessment:      Diagnoses and all orders for this visit:  Constipation, unspecified constipation type  -     Celiac Disease Panel; Future  -     TSH With Reflex Ft4; Future   Discussed in details all lifestyle changes including hydration and diet modification along with exercise.   If she continues to be symptomatic will refer to gastroenterology for further recommendations. Plan:      Return in about 3 months (around 2/11/2023) for weight loss counselling. Orders Placed This Encounter   Procedures    Celiac Disease Panel     Standing Status:   Future     Standing Expiration Date:   11/11/2023    TSH With Reflex Ft4     Standing Status:   Future     Standing Expiration Date:   11/11/2023     No orders of the defined types were placed in this encounter. Patient given educational materials - see patient instructions. Discussed use, benefit, and side effects of prescribedmedications. All patient questions answered. Pt voiced understanding. Reviewed health maintenance. Instructed to continue current medications, diet and exercise. Patient agreed with treatment plan. Follow up as directed. Electronically signed by   Delmi Rust MD on 11/11/2022 at 11:02 AM  [facility]. Please note that this chart was generated using voice recognition Dragon dictation software. Although every effort was made to ensure the accuracy of this automatedtranscription, some errors in transcription may have occurred.

## 2022-11-12 LAB
GLIADIN DEAMINIDATED PEPTIDE AB IGA: 1.4 U/ML
GLIADIN DEAMINIDATED PEPTIDE AB IGG: 0.8 U/ML
IGA: 179 MG/DL (ref 70–400)
TISSUE TRANSGLUTAMINASE IGA: 0.5 U/ML

## 2022-11-14 NOTE — RESULT ENCOUNTER NOTE
Celiac panel negative.   If patient continues to be symptomatic after lifestyle changes I would advise her to see gastroenterologist.  Have already placed referral.  Thank you

## 2022-12-12 ENCOUNTER — HOSPITAL ENCOUNTER (OUTPATIENT)
Age: 32
Setting detail: SPECIMEN
Discharge: HOME | End: 2022-12-12

## 2022-12-12 ENCOUNTER — OFFICE VISIT (OUTPATIENT)
Dept: OBGYN CLINIC | Age: 32
End: 2022-12-12
Payer: COMMERCIAL

## 2022-12-12 VITALS — HEIGHT: 66 IN | BODY MASS INDEX: 43.09 KG/M2 | DIASTOLIC BLOOD PRESSURE: 84 MMHG | SYSTOLIC BLOOD PRESSURE: 120 MMHG

## 2022-12-12 DIAGNOSIS — N63.10 MASS OF RIGHT BREAST, UNSPECIFIED QUADRANT: ICD-10-CM

## 2022-12-12 DIAGNOSIS — N94.6 DYSMENORRHEA: ICD-10-CM

## 2022-12-12 DIAGNOSIS — Z01.419 ENCOUNTER FOR GYNECOLOGICAL EXAMINATION WITHOUT ABNORMAL FINDING: Primary | ICD-10-CM

## 2022-12-12 DIAGNOSIS — R10.2 PELVIC PAIN: ICD-10-CM

## 2022-12-12 PROCEDURE — 99395 PREV VISIT EST AGE 18-39: CPT | Performed by: NURSE PRACTITIONER

## 2022-12-12 ASSESSMENT — ENCOUNTER SYMPTOMS
SHORTNESS OF BREATH: 0
VOMITING: 0
ABDOMINAL PAIN: 0
COLOR CHANGE: 0
NAUSEA: 0
COUGH: 0

## 2022-12-12 NOTE — PROGRESS NOTES
Kristina Lofton is a 29 y.o.  here for her annual exam.  The patient was seen and examined. The patients past medical, surgical, social and family history were reviewed. Current medications and allergies were reviewed, and documented in the chart. She is employed as . She is  they have had one child delivered in 2022. Tobacco abuse No     Last PAP: 2021- negative, hx of abnormal PAP no  Family hx uterine or ovarian cancer-deneis  Family hx of breast cancer -denies   family hx colon cancer -denies     Sexually active: yes - with , multiple partners: No, Dyspareunia: No, Vaginal discharge: no,  UTI symptoms: no, voiding difficulties: no, bowels regular:No bloating:no        Menstrual history:  Menarche age- 17, cycle every  28-30 days,  lasts 4-5 days. States when periods restarted after delivery she had some spotting in between but now regulated. States periods have been more painful though she states sometimes notes to be worse on let side of pelvis with menses.   Denies pelvic pain other times of month or dyspareunia or PCB,    Birth control: NFP    LMP: 22    OB History    Para Term  AB Living   1 1 1 0 0 1   SAB IAB Ectopic Molar Multiple Live Births   0 0 0   0 1      # Outcome Date GA Lbr Osiel/2nd Weight Sex Delivery Anes PTL Lv   1 Term 22 39w6d  7 lb 11.5 oz (3.5 kg) F CS-LTranv EPI N CHANDANA      Complications: Failure to Progress in First Stage       Vitals:    22 1321   BP: 120/84   Site: Left Upper Arm   Position: Sitting   Cuff Size: Large Adult   Height: 5' 6\" (1.676 m)       Wt Readings from Last 3 Encounters:   22 267 lb (121.1 kg)   22 271 lb (122.9 kg)   22 277 lb (125.6 kg)     Past Medical History:   Diagnosis Date    Migraines 2017    Previously on Imitrex but stopped once she found out she was pregnanct    PCOS (polycystic ovarian syndrome) Past Surgical History:   Procedure Laterality Date     SECTION N/A 2022     SECTION performed by Carmen Natarajan DO at [facility] L&D OR    TONSILLECTOMY  2013    WISDOM TOOTH EXTRACTION  2011     History reviewed. No pertinent family history. Social History     Tobacco Use   Smoking Status Never   Smokeless Tobacco Never     Social History     Substance and Sexual Activity   Alcohol Use Not Currently    Comment: occ,        Social History       Tobacco History       Smoking Status  Never      Smokeless Tobacco Use  Never              Alcohol History       Alcohol Use Status  Not Currently Comment  occ,              Drug Use       Drug Use Status  Not Currently              Sexual Activity       Sexually Active  Not Asked                  No Known Allergies  Current Outpatient Medications   Medication Sig Dispense Refill    levothyroxine (SYNTHROID) 88 MCG tablet TAKE 1 TABLET BY MOUTH IN THE MORNING IMMEDIATELY UPON ARISING, DELAY EATING/DRINKING FOR 30 MINUTES 90 tablet 3    metFORMIN (GLUCOPHAGE-XR) 500 MG extended release tablet TAKE FOUR TABLETS BY MOUTH DAILY WITH BREAKFAST 120 tablet 3    SUMAtriptan (IMITREX) 50 MG tablet Take 1 tablet by mouth daily as needed for Migraine 60 tablet 0    ibuprofen (ADVIL;MOTRIN) 800 MG tablet Take 1 tablet by mouth every 8 hours as needed for Pain 60 tablet 1    Prenatal Vit-Fe Fumarate-FA (PRENATAL VITAMINS PO) Take 1 tablet by mouth daily       No current facility-administered medications for this visit. Subjective:     Review of Systems   Constitutional:  Negative for chills and fever. Respiratory:  Negative for cough and shortness of breath. Cardiovascular:  Negative for chest pain, palpitations and leg swelling. Gastrointestinal:  Negative for abdominal pain, nausea and vomiting.    Genitourinary:  Negative for dyspareunia, dysuria, flank pain, menstrual problem, pelvic pain, vaginal bleeding, vaginal discharge and vaginal pain.   Skin:  Negative for color change and rash. Neurological:  Negative for dizziness, light-headedness and headaches. Hematological:  Negative for adenopathy. Does not bruise/bleed easily. Psychiatric/Behavioral:  Negative for self-injury and suicidal ideas. Objective:     Physical Exam  Vitals and nursing note reviewed. Constitutional:       General: She is not in acute distress. Appearance: She is well-developed. She is not diaphoretic. HENT:      Head: Normocephalic and atraumatic. Right Ear: External ear normal.      Left Ear: External ear normal.      Nose: Nose normal.   Eyes:      Pupils: Pupils are equal, round, and reactive to light. Neck:      Thyroid: No thyromegaly. Cardiovascular:      Rate and Rhythm: Normal rate and regular rhythm. Heart sounds: Normal heart sounds. No murmur heard. No friction rub. No gallop. Comments: No bilateral calf tenderness or swelling  Pulmonary:      Effort: Pulmonary effort is normal. No respiratory distress. Breath sounds: Normal breath sounds. No wheezing. Chest:   Breasts:     Right: Mass present. No swelling, bleeding, inverted nipple, nipple discharge, skin change or tenderness. Left: No swelling, bleeding, inverted nipple, mass, nipple discharge, skin change or tenderness. Abdominal:      General: Bowel sounds are normal.      Palpations: Abdomen is soft. Tenderness: There is no abdominal tenderness. Genitourinary:     Comments:   Vulva-no lesions  Vagina-pink rugated  Cervix-firm, Nontender, freely movable, no lesions  Uterus-Smooth, firm, nontender, freely movable  Adnexa-no masses or tenderness   Musculoskeletal:         General: Normal range of motion. Cervical back: Normal range of motion and neck supple. Lymphadenopathy:      Cervical: No cervical adenopathy. Upper Body:      Right upper body: No supraclavicular, axillary or pectoral adenopathy.       Left upper body: No supraclavicular, axillary or pectoral adenopathy. Skin:     General: Skin is warm and dry. Findings: No rash. Neurological:      Mental Status: She is alert and oriented to person, place, and time. Cranial Nerves: No cranial nerve deficit. Deep Tendon Reflexes: Reflexes are normal and symmetric. Psychiatric:         Behavior: Behavior normal.         Thought Content: Thought content normal.         Judgment: Judgment normal.     /84 (Site: Left Upper Arm, Position: Sitting, Cuff Size: Large Adult)   Ht 5' 6\" (1.676 m)   LMP 11/26/2022 (Exact Date)   Breastfeeding No   BMI 43.09 kg/m²     Assessment:       Diagnosis Orders   1. Encounter for gynecological examination without abnormal finding  PAP SMEAR      2. Dysmenorrhea  US PELVIS COMPLETE    US NON OB TRANSVAGINAL      3. Pelvic pain  US PELVIS COMPLETE    US NON OB TRANSVAGINAL      4. Mass of right breast, unspecified quadrant  US BREAST COMPLETE RIGHT    CARMEN DIGITAL DIAGNOSTIC W OR WO CAD BILATERAL          Breast exam completed. Pelvic exam pap smear collected and sent. Cultures sent No    Plan:   Collect pap   BSE reviewed, mobile mass/tissue noted next to right nipple with  breast exam today, she denies noting this prior. Denies are being tender no skin changes no galactorrhea. Mammogram ordered: yes and breast US  STD prevention reviewed  Gardisil counseling completed for all patients 10-37 yo  Dysmenorrhea, left sided pelvic pain with menses:   Cultures declined   Check pelvic US  Could consider some form hormonal contraceptive if persists/worsens. Diet & Exercise reviewed with pt. Preventive  Health through PCP   RV prn4 weeks if persists, or annual           Orders Placed This Encounter   Procedures    US PELVIS COMPLETE     This procedure can be scheduled via [facility]. Access your [facility] account by visiting [email or web address].      Standing Status:   Future     Standing Expiration Date:   12/12/2023    US NON OB TRANSVAGINAL     Begin with transabdominal imaging. Standing Status:   Future     Standing Expiration Date:   2023     Order Specific Question:   Reason for exam:     Answer:   abnormal bleeding    US BREAST COMPLETE RIGHT     Standing Status:   Future     Standing Expiration Date:   2023    CARMEN DIGITAL DIAGNOSTIC W OR WO CAD BILATERAL     Standing Status:   Future     Standing Expiration Date:   2024    PAP SMEAR     Patient History:    No LMP recorded. OBGYN Status: Unknown  Past Surgical History:  2022:  SECTION; N/A      Comment:   SECTION performed by Harriett Farrar DO at [facility]                L&D OR  2013: TONSILLECTOMY  2011: WISDOM TOOTH EXTRACTION      Social History    Tobacco Use      Smoking status: Never      Smokeless tobacco: Never       Standing Status:   Future     Standing Expiration Date:   2023     Order Specific Question:   Collection Type     Answer: Thin Prep     Order Specific Question:   Prior Abnormal Pap Test     Answer:   No     Order Specific Question:   Screening or Diagnostic     Answer:   Screening     Order Specific Question:   HPV Requested? Answer:   Yes     Order Specific Question:   High Risk Patient     Answer:   N/A     No orders of the defined types were placed in this encounter. Patient given educational materials - seepatient instructions. Discussed use, benefit, and side effects of prescribed medications. All patient questions answered. Pt voiced understanding. Reviewed health maintenance. Instructed to continue current medications, diet and exercise. Patient agreedwith treatment plan. Follow up as directed.       Electronically signed by DAVIAN Forde CNP on t 1:49 PM

## 2022-12-13 LAB
HPV SAMPLE: NORMAL
HPV, GENOTYPE 16: NOT DETECTED
HPV, GENOTYPE 18: NOT DETECTED
HPV, HIGH RISK OTHER: NOT DETECTED
HPV, INTERPRETATION: NORMAL
SPECIMEN DESCRIPTION: NORMAL

## 2022-12-21 ENCOUNTER — HOSPITAL ENCOUNTER (OUTPATIENT)
Dept: ULTRASOUND IMAGING | Age: 32
Discharge: HOME | End: 2022-12-23
Payer: COMMERCIAL

## 2022-12-21 ENCOUNTER — HOSPITAL ENCOUNTER (OUTPATIENT)
Dept: MAMMOGRAPHY | Age: 32
Discharge: HOME | End: 2022-12-23
Payer: COMMERCIAL

## 2022-12-21 DIAGNOSIS — N63.10 MASS OF RIGHT BREAST, UNSPECIFIED QUADRANT: ICD-10-CM

## 2022-12-21 PROCEDURE — 76642 ULTRASOUND BREAST LIMITED: CPT

## 2022-12-21 PROCEDURE — G0279 TOMOSYNTHESIS, MAMMO: HCPCS

## 2022-12-27 DIAGNOSIS — E28.2 PCOS (POLYCYSTIC OVARIAN SYNDROME): ICD-10-CM

## 2022-12-27 RX ORDER — METFORMIN HYDROCHLORIDE 500 MG/1
TABLET, EXTENDED RELEASE ORAL
Qty: 120 TABLET | Refills: 3 | Status: SHIPPED | OUTPATIENT
Start: 2022-12-27

## 2023-01-09 ENCOUNTER — OFFICE VISIT (OUTPATIENT)
Dept: OBGYN CLINIC | Age: 33
End: 2023-01-09
Payer: COMMERCIAL

## 2023-01-09 VITALS
DIASTOLIC BLOOD PRESSURE: 68 MMHG | BODY MASS INDEX: 42.75 KG/M2 | SYSTOLIC BLOOD PRESSURE: 118 MMHG | HEIGHT: 66 IN | WEIGHT: 266 LBS

## 2023-01-09 DIAGNOSIS — R10.2 PELVIC PAIN: ICD-10-CM

## 2023-01-09 DIAGNOSIS — N63.10 MASS OF RIGHT BREAST, UNSPECIFIED QUADRANT: ICD-10-CM

## 2023-01-09 DIAGNOSIS — N94.6 DYSMENORRHEA: Primary | ICD-10-CM

## 2023-01-09 PROCEDURE — 99214 OFFICE O/P EST MOD 30 MIN: CPT | Performed by: NURSE PRACTITIONER

## 2023-01-09 NOTE — PROGRESS NOTES
Subjective:  Marquis Arambula is in for follow up on pelvic US and mammogram and breast US. Marquis Arambula had presented for her annual exam on 12/12/22 and had additional complaint of painful periods. Stated when periods restarted after delivery of her daughter ( July 2022) she had some spotting in between but now regulated. Stated periods have been more painful though she states sometimes notes to be worse on let side of pelvis with menses. Denies pelvic pain other times of month but now states thinks maybe mild pain around ovulation time. She denies dyspareunia or PCB. Last period was 12/25/22 and denies being heavy. She states was painful but not as painful as past few months had been. Denies mid cycle spotting. Denies urinary symptoms or bowel habit changes with pelvic pain/periods. Denies vaginal discharge, itching or odor. PAP December 2022- WNL  She had declined cultures with December annual exam.   Pelvic US was ordered and completed. At annual exam pt was also noted to have mass to right breast. She denies noting breast mass prior to exam. She denies breast pain/ tenderness, no skin changes no galactorrhea. She reports there is a personal history or family history of:    Smoking (> 15 cigs/day): no    Migraine with Aura: no, hx migraines without aura    HTN (> 160/100):  no    DVT:  no    Thrombophilias:  no    Stroke (CVA): no    Ischemic heart disease:  no    Valvular heart disease (A Fib, Pul HTN, etc): no    Positive Antiphospholipid Abs:  no    Liver Disease:  no      Review of systems per HPI, otherwise negative. Allergies; medications; past medical, surgical, family, and social history; and problem list reviewed as indicated in this encounter. Objective:  Vitals:  Blood pressure 118/68, height 5' 6\" (1.676 m), weight 266 lb (120.7 kg), not currently breastfeeding. Constitutional: She is oriented to person, place, and time.   She appears well-developed and well-nourished and in no acute distress. Cardiovascular: Normal rate and regular rhythm, no murmur, rub, or gallop    Pulmonary/Chest: Effort normal and breath sounds normal. No rales or wheezes. No chest retraction. Extremities: no clubbing, cyanosis, or edema  Neurological:  CN II - XII grossly intact; no focal neurological deficits  Psychiatric:  Well groomed, well dressed. The patient maintains appropriate eye contact and does not appear to be responding to internal stimuli. No agitation    EXAMINATION:   DIAGNOSTIC DIGITAL BILATERAL BREASTS MAMMOGRAM WITH TOMOSYNTHESIS;    TARGETED   ULTRASOUND OF THE RIGHT BREAST       12/21/2022 1:54 pm; 12/21/2022 2:50 pm       TECHNIQUE:   Diagnostic mammography of the bilateral breasts was performed with   tomosynthesis. 2D standard and 3D tomosynthesis combination imaging   performed through both breasts. Computer aided detection was utilized in    the   interpretation of this exam.; Targeted ultrasound of the right breast was   performed. VIEWS: MLO and craniocaudal views of both breasts. COMPARISON:   None. Baseline study. Clinician nurse-practitioner feels right breast    lump   at approximately 2 o'clock. HISTORY:   ORDERING SYSTEM PROVIDED HISTORY: Mass of right breast, unspecified    quadrant   TECHNOLOGIST PROVIDED HISTORY:   Is the patient pregnant?->No; ORDERING SYSTEM PROVIDED HISTORY: Mass of    right   breast, unspecified quadrant       Negative family history of breast cancer. Negative history of hormonal   replacement therapy. FINDINGS:       Mammogram:       Density: Scattered fibroglandular density. No skin thickening, nipple contour changes, malignant type   microcalcifications, or areas of architectural distortion are noted. Benign   macrocalcifications are present in the right breast with appearance of   necrosis in the area of palpable asymmetry.            Ultrasound:       Targeted ultrasonography of the right breast in the area of palpable   asymmetry reveals a well-defined hypoechoic area of 1.34 x 0.36 cm    suggesting   a lobule within the ligamentous structure. No vascular flow. Good    posterior   acoustic enhancement is noted. Impression   No evidence of malignancy. Palpable asymmetry corresponds to a breast    lobule   within a ligament. Findings are benign. Patient should be followed   clinically. Follow-up imaging as clinically necessary. BI-RADS 2       BIRADS:   BIRADS - CATEGORY 2       Benign Findings. Normal interval follow-up is recommended in 12 months. OVERALL ASSESSMENT - BENIGN       A letter of notification will be sent to the patient regarding the    results. The [employer] recommends annual mammograms for women    40   years and older. Exam Date: 12/30/2022       Dx: Dysmenorrhea, Pelvic Pain       Uterus: 8.4x5. 1x3.9cm, anteverted   Endometrial Stripe: 6.0mm   Right Ovary: visualized and appear normal in size and shape   Left Ovary: visualized and appear normal in size and shape   No free fluid in pelvis       Assessment/ Plan / Medical Decision Making   Diagnosis Orders   1. Dysmenorrhea        2. Pelvic pain        3. Mass of right breast, unspecified quadrant                Medications, laboratory testing, imaging, consultation, and follow up as documented in this encounter. Pelvic pain/dysmenorrhea:   Discussed possible management of pelvic pain discussed with patient. - Monitoring     -Pros and cons of medical management with OCPs- switching to another form of birth control that decreases inflammation or decreasing the number of cycles, etc, Depo-Provera injections or cyclic progestogens, IUD benefits discussed,   -surgical management with laparoscopy, endometrial ablation, hysteroscopy D &C. Discussed risks of repeated laparoscopy with abdominal adhesions and possible bowel or vessel injury.      Sh eat this time wants to monitor pelvic pain/dysmenorrhea discussed could also consider PFT in future if she would like. Monitor severe pain especially ones sided, heavy bleeding >1 tampon/pad hr, fevers/chills notify us, go to ER. Breast Mass- reviewed US and mammogram breast with pt. No evidence of malignancy. Palpable asymmetry corresponds to a breast    lobule   within a ligament. She denies noting breast pain or increased size of mass. Disucssed monitor any worsening symptoms notify us. F/u annual/prn    Gilda Argueta received counseling on the following healthy behaviors: hormonal contraceptive options    Patient given educational materials on pelvic pain    Was a self-tracking handout given in paper form or via [employer]? No  If yes, see orders or list here. Discussed use, benefit, and side effects of prescribed medications. Barriers to medication compliance addressed. All patient questions answered. Pt voiced understanding. This note is created with the assistance of a speech-recognition program.  While intending to generate a document that actually reflects the content of the visit, no guarantees can be provided that every mistake has been identified and corrected by editing. Of the 30 minute duration appointment visit, Ailyn Decker CNP spent at least 50% of the face-to-face time in counseling, explanation of diagnosis, planning of further management, and answering all questions.

## 2023-01-16 ENCOUNTER — PATIENT MESSAGE (OUTPATIENT)
Dept: PRIMARY CARE CLINIC | Age: 33
End: 2023-01-16

## 2023-01-16 ENCOUNTER — OFFICE VISIT (OUTPATIENT)
Dept: PRIMARY CARE CLINIC | Age: 33
End: 2023-01-16
Payer: COMMERCIAL

## 2023-01-16 VITALS
BODY MASS INDEX: 43.09 KG/M2 | HEART RATE: 81 BPM | WEIGHT: 267 LBS | DIASTOLIC BLOOD PRESSURE: 76 MMHG | SYSTOLIC BLOOD PRESSURE: 118 MMHG | OXYGEN SATURATION: 97 %

## 2023-01-16 DIAGNOSIS — E66.01 CLASS 3 SEVERE OBESITY WITH BODY MASS INDEX (BMI) OF 40.0 TO 44.9 IN ADULT, UNSPECIFIED OBESITY TYPE, UNSPECIFIED WHETHER SERIOUS COMORBIDITY PRESENT (HCC): ICD-10-CM

## 2023-01-16 DIAGNOSIS — E66.01 CLASS 3 SEVERE OBESITY WITH BODY MASS INDEX (BMI) OF 40.0 TO 44.9 IN ADULT, UNSPECIFIED OBESITY TYPE, UNSPECIFIED WHETHER SERIOUS COMORBIDITY PRESENT (HCC): Primary | ICD-10-CM

## 2023-01-16 DIAGNOSIS — E03.9 HYPOTHYROIDISM, UNSPECIFIED TYPE: Primary | ICD-10-CM

## 2023-01-16 DIAGNOSIS — E28.2 PCOS (POLYCYSTIC OVARIAN SYNDROME): ICD-10-CM

## 2023-01-16 DIAGNOSIS — G43.009 MIGRAINE WITHOUT AURA AND WITHOUT STATUS MIGRAINOSUS, NOT INTRACTABLE: ICD-10-CM

## 2023-01-16 PROCEDURE — 99204 OFFICE O/P NEW MOD 45 MIN: CPT | Performed by: FAMILY MEDICINE

## 2023-01-16 RX ORDER — PHENTERMINE HYDROCHLORIDE 37.5 MG/1
37.5 TABLET ORAL
Qty: 30 TABLET | Refills: 0 | Status: SHIPPED | OUTPATIENT
Start: 2023-01-16 | End: 2023-02-15

## 2023-01-16 RX ORDER — SEMAGLUTIDE 0.25 MG/.5ML
0.25 INJECTION, SOLUTION SUBCUTANEOUS
Qty: 2 ML | Refills: 0 | Status: SHIPPED | OUTPATIENT
Start: 2023-01-16

## 2023-01-16 ASSESSMENT — ENCOUNTER SYMPTOMS
NAUSEA: 0
SHORTNESS OF BREATH: 0
VOMITING: 0

## 2023-01-16 ASSESSMENT — PATIENT HEALTH QUESTIONNAIRE - PHQ9
1. LITTLE INTEREST OR PLEASURE IN DOING THINGS: 0
SUM OF ALL RESPONSES TO PHQ QUESTIONS 1-9: 0
SUM OF ALL RESPONSES TO PHQ9 QUESTIONS 1 & 2: 0
SUM OF ALL RESPONSES TO PHQ QUESTIONS 1-9: 0
2. FEELING DOWN, DEPRESSED OR HOPELESS: 0

## 2023-01-16 NOTE — PROGRESS NOTES
[facility] PRIMARY CARE  [facility] [address]  [address] [address]  Dept: [phone]  Dept Fax: [phone]    Tracy Woods is a 29 y.o. female who presents today for her medical conditions/complaints as noted below. Tracy Woods is c/o of  Chief Complaint   Patient presents with    New Patient         HPI:     HPI    Pt here to establish care with new pcp  Hx of pcos, on metformin . Last A1c was normal.   Hx of hypothyroidism, on levothyroxine. Would like medication to help with weight loss, discussed adipex and also wegovy/saxenda. She would like to try wegovy      Hx of migraines about 1-2 a month takes ibuprofen for them as imitrex makes her drowsy        Hemoglobin A1C (%)   Date Value   08/10/2022 5.2   2021 5.5   2020 5.5             ( goal A1C is < 7)   No results found for: LABMICR  No results found for: LDLCHOLESTEROL, LDLCALC    (goal LDL is <100)   AST (U/L)   Date Value   08/10/2022 21     ALT (U/L)   Date Value   08/10/2022 22     BUN (mg/dL)   Date Value   08/10/2022 9     BP Readings from Last 3 Encounters:   23 118/76   23 118/68   22 120/84          (goal 120/80)    Past Medical History:   Diagnosis Date    Migraines 2017    Previously on Imitrex but stopped once she found out she was pregnanct    PCOS (polycystic ovarian syndrome)       Past Surgical History:   Procedure Laterality Date     SECTION N/A 2022     [facility]    TONSILLECTOMY  2013    WISDOM TOOTH EXTRACTION  2011       No family history on file.     Social History     Tobacco Use    Smoking status: Never    Smokeless tobacco: Never   Substance Use Topics    Alcohol use: Not Currently     Comment: occ,      Current Outpatient Medications   Medication Sig Dispense Refill    Semaglutide-Weight Management (WEGOVY) 0.25 MG/0.5ML SOAJ SC injection Inject 0.25 mg into the skin every 7 days 2 mL 0    metFORMIN (GLUCOPHAGE-XR) 500 MG extended release tablet TAKE FOUR TABLETS BY MOUTH DAILY WITH BREAKFAST 120 tablet 3    levothyroxine (SYNTHROID) 88 MCG tablet TAKE 1 TABLET BY MOUTH IN THE MORNING IMMEDIATELY UPON ARISING, DELAY EATING/DRINKING FOR 30 MINUTES 90 tablet 3    SUMAtriptan (IMITREX) 50 MG tablet Take 1 tablet by mouth daily as needed for Migraine 60 tablet 0    ibuprofen (ADVIL;MOTRIN) 800 MG tablet Take 1 tablet by mouth every 8 hours as needed for Pain 60 tablet 1    Prenatal Vit-Fe Fumarate-FA (PRENATAL VITAMINS PO) Take 1 tablet by mouth daily       No current facility-administered medications for this visit. No Known Allergies    Health Maintenance   Topic Date Due    Varicella vaccine (1 of 2 - 2-dose childhood series) Never done    COVID-19 Vaccine (3 - Booster for Pfizer series) 06/07/2021    Flu vaccine (1) 11/11/2023 (Originally 8/1/2022)    A1C test (Diabetic or Prediabetic)  08/10/2023    Depression Screen  11/11/2023    Cervical cancer screen  12/12/2027    DTaP/Tdap/Td vaccine (2 - Td or Tdap) 05/12/2032    Hepatitis C screen  Completed    HIV screen  Completed    Hepatitis A vaccine  Aged Out    Hib vaccine  Aged Out    Meningococcal (ACWY) vaccine  Aged Out    Pneumococcal 0-64 years Vaccine  Aged Out       Subjective:      Review of Systems   Constitutional:  Negative for chills and fever. Respiratory:  Negative for shortness of breath. Gastrointestinal:  Negative for nausea and vomiting. Neurological:  Negative for dizziness. Objective:     Physical Exam  Constitutional:       Appearance: She is well-developed. HENT:      Head: Normocephalic and atraumatic. Right Ear: External ear normal.      Left Ear: External ear normal.   Eyes:      Conjunctiva/sclera: Conjunctivae normal.      Pupils: Pupils are equal, round, and reactive to light. Cardiovascular:      Rate and Rhythm: Normal rate and regular rhythm. Heart sounds: Normal heart sounds. Pulmonary:      Effort: Pulmonary effort is normal.      Breath sounds: Normal breath sounds. Musculoskeletal:      Cervical back: Neck supple. Skin:     General: Skin is warm and dry. Neurological:      Mental Status: She is alert and oriented to person, place, and time. Psychiatric:         Mood and Affect: Mood normal.         Behavior: Behavior normal.         Thought Content: Thought content normal.     /76   Pulse 81   Wt 267 lb (121.1 kg)   SpO2 97%   BMI 43.09 kg/m²     Assessment:      1. Hypothyroidism, unspecified type  - doing well on current levothyroxine dose. - denies any hx of thyroid cancer or fam hx of thyroid cancer    2. PCOS (polycystic ovarian syndrome)  - continue metformin, following with obgyn. 3. Migraine without aura and without status migrainosus, not intractable  - has 1-2 a month, ibuprofen helps. 4. Class 3 severe obesity with body mass index (BMI) of 40.0 to 44.9 in adult, unspecified obesity type, unspecified whether serious comorbidity present ([facility]  - recommend trial of wegovy. - Semaglutide-Weight Management (WEGOVY) 0.25 MG/0.5ML SOAJ SC injection; Inject 0.25 mg into the skin every 7 days  Dispense: 2 mL; Refill: 0         Plan:      Return in about 6 months (around 7/16/2023) for follow up. No orders of the defined types were placed in this encounter. Orders Placed This Encounter   Medications    Semaglutide-Weight Management (WEGOVY) 0.25 MG/0.5ML SOAJ SC injection     Sig: Inject 0.25 mg into the skin every 7 days     Dispense:  2 mL     Refill:  0          Patient given educational materials - see patient instructions. Discussed use, benefit, and side effects of prescribed medications. All patient questions answered. Pt voiced understanding. Reviewed healthmaintenance. Instructed to continue current medications, diet and exercise. Patient agreed with treatment plan. Follow up as directed.      Electronically signed by Nasim Jernigan DO on 1/16/2023 at 11:28 AM

## 2023-01-16 NOTE — TELEPHONE ENCOUNTER
From: Toma Guzmán  To: Dr. Shima Singh: 1/16/2023 2:38 PM EST  Subject: Medication    Hello, my insurance doesnt cover injection medication so could we try the other medication you mentioned?

## 2023-02-20 ENCOUNTER — OFFICE VISIT (OUTPATIENT)
Dept: PRIMARY CARE CLINIC | Age: 33
End: 2023-02-20
Payer: COMMERCIAL

## 2023-02-20 VITALS
HEART RATE: 86 BPM | WEIGHT: 262 LBS | SYSTOLIC BLOOD PRESSURE: 122 MMHG | DIASTOLIC BLOOD PRESSURE: 78 MMHG | OXYGEN SATURATION: 99 % | BODY MASS INDEX: 42.29 KG/M2

## 2023-02-20 DIAGNOSIS — J40 BRONCHITIS: Primary | ICD-10-CM

## 2023-02-20 DIAGNOSIS — R05.9 COUGH, UNSPECIFIED TYPE: ICD-10-CM

## 2023-02-20 PROCEDURE — 99213 OFFICE O/P EST LOW 20 MIN: CPT | Performed by: FAMILY MEDICINE

## 2023-02-20 RX ORDER — BENZONATATE 100 MG/1
100 CAPSULE ORAL 3 TIMES DAILY PRN
Qty: 30 CAPSULE | Refills: 1 | Status: SHIPPED | OUTPATIENT
Start: 2023-02-20

## 2023-02-20 SDOH — ECONOMIC STABILITY: FOOD INSECURITY: WITHIN THE PAST 12 MONTHS, YOU WORRIED THAT YOUR FOOD WOULD RUN OUT BEFORE YOU GOT MONEY TO BUY MORE.: NEVER TRUE

## 2023-02-20 SDOH — ECONOMIC STABILITY: FOOD INSECURITY: WITHIN THE PAST 12 MONTHS, THE FOOD YOU BOUGHT JUST DIDN'T LAST AND YOU DIDN'T HAVE MONEY TO GET MORE.: NEVER TRUE

## 2023-02-20 SDOH — ECONOMIC STABILITY: HOUSING INSECURITY
IN THE LAST 12 MONTHS, WAS THERE A TIME WHEN YOU DID NOT HAVE A STEADY PLACE TO SLEEP OR SLEPT IN A SHELTER (INCLUDING NOW)?: NO

## 2023-02-20 SDOH — ECONOMIC STABILITY: INCOME INSECURITY: HOW HARD IS IT FOR YOU TO PAY FOR THE VERY BASICS LIKE FOOD, HOUSING, MEDICAL CARE, AND HEATING?: NOT HARD AT ALL

## 2023-02-20 ASSESSMENT — ENCOUNTER SYMPTOMS
COUGH: 1
WHEEZING: 1

## 2023-02-20 NOTE — PROGRESS NOTES
[facility] PRIMARY CARE  [address] [facility] [address]  [address] [address]  Dept: [phone]  Dept Fax: [phone]    Ingris Vale is a 34 y.o. female who presents today for her medical conditions/complaints as noted below. Ingris Vale is c/o of  Chief Complaint   Patient presents with    Cough    Chest Congestion     X2wks, did see a walk in this past weekend and had a nebulizer treatment & RX for an inhaler, Augmentin was given a few weeks ago which didn't work, given a 3day zpak which has not worked & prednisone         HPI:     HPI    Pt here due to cough follow up. Coughing for about 2 weeks. , was prescirbed augmentin first and then later went back to urgent care and was prescirbed zpak and prednisone. Nebulizer helping more than albuterol inhaler so far. She is almost done with her prednisone. Symptoms ongoing, not feeling better. Had covid December of last year. Hemoglobin A1C (%)   Date Value   08/10/2022 5.2   2021 5.5   2020 5.5             ( goal A1C is < 7)   No results found for: LABMICR  No results found for: LDLCHOLESTEROL, LDLCALC    (goal LDL is <100)   AST (U/L)   Date Value   08/10/2022 21     ALT (U/L)   Date Value   08/10/2022 22     BUN (mg/dL)   Date Value   08/10/2022 9     BP Readings from Last 3 Encounters:   23 122/78   23 118/76   23 118/68          (goal 120/80)    Past Medical History:   Diagnosis Date    Migraines 2017    Previously on Imitrex but stopped once she found out she was pregnanct    PCOS (polycystic ovarian syndrome)       Past Surgical History:   Procedure Laterality Date     SECTION N/A 2022     SECTION performed by Madi Nuno DO at [facility] L&D OR    TONSILLECTOMY  2013    WISDOM TOOTH EXTRACTION  2011       No family history on file.     Social History     Tobacco Use    Smoking status: Never    Smokeless tobacco: Never   Substance Use Topics    Alcohol use: Not Currently     Comment: occ,      Current Outpatient Medications   Medication Sig Dispense Refill    benzonatate (TESSALON PERLES) 100 MG capsule Take 1 capsule by mouth 3 times daily as needed for Cough 30 capsule 1    Semaglutide-Weight Management (WEGOVY) 0.25 MG/0.5ML SOAJ SC injection Inject 0.25 mg into the skin every 7 days 2 mL 0    metFORMIN (GLUCOPHAGE-XR) 500 MG extended release tablet TAKE FOUR TABLETS BY MOUTH DAILY WITH BREAKFAST 120 tablet 3    levothyroxine (SYNTHROID) 88 MCG tablet TAKE 1 TABLET BY MOUTH IN THE MORNING IMMEDIATELY UPON ARISING, DELAY EATING/DRINKING FOR 30 MINUTES 90 tablet 3    SUMAtriptan (IMITREX) 50 MG tablet Take 1 tablet by mouth daily as needed for Migraine 60 tablet 0    ibuprofen (ADVIL;MOTRIN) 800 MG tablet Take 1 tablet by mouth every 8 hours as needed for Pain 60 tablet 1    Prenatal Vit-Fe Fumarate-FA (PRENATAL VITAMINS PO) Take 1 tablet by mouth daily       No current facility-administered medications for this visit. No Known Allergies    Health Maintenance   Topic Date Due    Varicella vaccine (1 of 2 - 2-dose childhood series) Never done    COVID-19 Vaccine (3 - Booster for Pfizer series) 06/07/2021    Flu vaccine (1) 11/11/2023 (Originally 8/1/2022)    A1C test (Diabetic or Prediabetic)  08/10/2023    Depression Screen  01/16/2024    Cervical cancer screen  12/12/2027    DTaP/Tdap/Td vaccine (2 - Td or Tdap) 05/12/2032    Hepatitis C screen  Completed    HIV screen  Completed    Hepatitis A vaccine  Aged Out    Hib vaccine  Aged Out    Meningococcal (ACWY) vaccine  Aged Out    Pneumococcal 0-64 years Vaccine  Aged Out       Subjective:      Review of Systems   Constitutional:  Negative for chills and fever. HENT:  Positive for congestion. Respiratory:  Positive for cough and wheezing. Objective:     Physical Exam  Constitutional:       Appearance: She is well-developed. HENT:      Head: Normocephalic and atraumatic.       Right Ear: External ear normal.      Left Ear: External ear normal.   Eyes:      Conjunctiva/sclera: Conjunctivae normal.      Pupils: Pupils are equal, round, and reactive to light. Cardiovascular:      Rate and Rhythm: Normal rate and regular rhythm. Heart sounds: Normal heart sounds. Pulmonary:      Effort: Pulmonary effort is normal.      Breath sounds: Normal breath sounds. Musculoskeletal:      Cervical back: Neck supple. Skin:     General: Skin is warm and dry. Neurological:      Mental Status: She is alert and oriented to person, place, and time. Psychiatric:         Mood and Affect: Mood normal.         Behavior: Behavior normal.         Thought Content: Thought content normal.     /78   Pulse 86   Wt 262 lb (118.8 kg)   SpO2 99%   BMI 42.29 kg/m²     Assessment:      1. Bronchitis  - recommend tessalon perles for cough. discussed if symptoms worsen or not improving to get chest xray. Continue nebulizer treatments as well. 2. Cough, unspecified type    - XR CHEST STANDARD (2 VW); Future         Plan:      Return for follow up. Orders Placed This Encounter   Procedures    XR CHEST STANDARD (2 VW)     Standing Status:   Future     Standing Expiration Date:   2/20/2024     Order Specific Question:   Reason for exam:     Answer:   cough for over 2 weeks, not improving     Orders Placed This Encounter   Medications    benzonatate (TESSALON PERLES) 100 MG capsule     Sig: Take 1 capsule by mouth 3 times daily as needed for Cough     Dispense:  30 capsule     Refill:  1        Patient given educational materials - see patient instructions. Discussed use, benefit, and side effects of prescribed medications. All patient questions answered. Pt voiced understanding. Reviewed healthmaintenance. Instructed to continue current medications, diet and exercise. Patient agreed with treatment plan. Follow up as directed.      Electronically signed by Chele Lozada DO on 2/20/2023 at 11:59 AM

## 2023-03-14 ENCOUNTER — PATIENT MESSAGE (OUTPATIENT)
Dept: PRIMARY CARE CLINIC | Age: 33
End: 2023-03-14

## 2023-03-14 NOTE — TELEPHONE ENCOUNTER
From: Lee De La Cruz  To: Dr. Clarisa Nichole: 3/14/2023 9:43 AM EDT  Subject: Prescription    Good morning! We had discussed me starting the [facility]. Unfortunately my insurance didn't cover it so we started phentermine and it did not seem to work. I attached something from my insurance to complete for medical necessity. Would you be able to do this to see if they would cover the [facility]? Thank you!   Lee De La Cruz

## 2023-03-22 ENCOUNTER — TELEPHONE (OUTPATIENT)
Dept: PRIMARY CARE CLINIC | Age: 33
End: 2023-03-22

## 2023-03-22 NOTE — TELEPHONE ENCOUNTER
Tyler a benefit  with from [address] with [facility] called requesting a progress note in order to determine an approval for wegovy    Progress notes faxed

## 2023-04-30 DIAGNOSIS — E28.2 PCOS (POLYCYSTIC OVARIAN SYNDROME): ICD-10-CM

## 2023-05-01 RX ORDER — METFORMIN HYDROCHLORIDE 500 MG/1
TABLET, EXTENDED RELEASE ORAL
Qty: 120 TABLET | Refills: 3 | Status: SHIPPED | OUTPATIENT
Start: 2023-05-01

## 2023-07-31 ASSESSMENT — ANXIETY QUESTIONNAIRES
1. FEELING NERVOUS, ANXIOUS, OR ON EDGE: SEVERAL DAYS
IF YOU CHECKED OFF ANY PROBLEMS ON THIS QUESTIONNAIRE, HOW DIFFICULT HAVE THESE PROBLEMS MADE IT FOR YOU TO DO YOUR WORK, TAKE CARE OF THINGS AT HOME, OR GET ALONG WITH OTHER PEOPLE: SOMEWHAT DIFFICULT
5. BEING SO RESTLESS THAT IT IS HARD TO SIT STILL: 0
2. NOT BEING ABLE TO STOP OR CONTROL WORRYING: SEVERAL DAYS
2. NOT BEING ABLE TO STOP OR CONTROL WORRYING: 1
1. FEELING NERVOUS, ANXIOUS, OR ON EDGE: 1
5. BEING SO RESTLESS THAT IT IS HARD TO SIT STILL: NOT AT ALL
IF YOU CHECKED OFF ANY PROBLEMS ON THIS QUESTIONNAIRE, HOW DIFFICULT HAVE THESE PROBLEMS MADE IT FOR YOU TO DO YOUR WORK, TAKE CARE OF THINGS AT HOME, OR GET ALONG WITH OTHER PEOPLE: SOMEWHAT DIFFICULT
3. WORRYING TOO MUCH ABOUT DIFFERENT THINGS: SEVERAL DAYS
6. BECOMING EASILY ANNOYED OR IRRITABLE: SEVERAL DAYS
6. BECOMING EASILY ANNOYED OR IRRITABLE: 1
4. TROUBLE RELAXING: 1
7. FEELING AFRAID AS IF SOMETHING AWFUL MIGHT HAPPEN: 1
3. WORRYING TOO MUCH ABOUT DIFFERENT THINGS: 1
7. FEELING AFRAID AS IF SOMETHING AWFUL MIGHT HAPPEN: SEVERAL DAYS
GAD7 TOTAL SCORE: 6
4. TROUBLE RELAXING: SEVERAL DAYS

## 2023-07-31 ASSESSMENT — PATIENT HEALTH QUESTIONNAIRE - PHQ9
SUM OF ALL RESPONSES TO PHQ QUESTIONS 1-9: 0
2. FEELING DOWN, DEPRESSED OR HOPELESS: NOT AT ALL
SUM OF ALL RESPONSES TO PHQ QUESTIONS 1-9: 0
SUM OF ALL RESPONSES TO PHQ QUESTIONS 1-9: 0
SUM OF ALL RESPONSES TO PHQ9 QUESTIONS 1 & 2: 0
1. LITTLE INTEREST OR PLEASURE IN DOING THINGS: 0
SUM OF ALL RESPONSES TO PHQ QUESTIONS 1-9: 0
1. LITTLE INTEREST OR PLEASURE IN DOING THINGS: NOT AT ALL
2. FEELING DOWN, DEPRESSED OR HOPELESS: 0
SUM OF ALL RESPONSES TO PHQ9 QUESTIONS 1 & 2: 0

## 2023-08-02 ENCOUNTER — OFFICE VISIT (OUTPATIENT)
Dept: OBGYN CLINIC | Age: 33
End: 2023-08-02
Payer: COMMERCIAL

## 2023-08-02 VITALS
WEIGHT: 264 LBS | DIASTOLIC BLOOD PRESSURE: 62 MMHG | BODY MASS INDEX: 42.43 KG/M2 | SYSTOLIC BLOOD PRESSURE: 116 MMHG | HEIGHT: 66 IN

## 2023-08-02 DIAGNOSIS — E28.2 PCOS (POLYCYSTIC OVARIAN SYNDROME): Primary | ICD-10-CM

## 2023-08-02 PROCEDURE — 99214 OFFICE O/P EST MOD 30 MIN: CPT | Performed by: ADVANCED PRACTICE MIDWIFE

## 2023-08-02 ASSESSMENT — ENCOUNTER SYMPTOMS
SHORTNESS OF BREATH: 0
ABDOMINAL PAIN: 0
NAUSEA: 0
VOMITING: 0
DIARRHEA: 0

## 2023-08-02 NOTE — PROGRESS NOTES
that are between 21 and 35 days and this is not 100% but is a good sign of ovulation. We did order a 21-day progesterone to evaluate for ovulation. Reviewed that if patient begins becoming anovulatory not having cycles would recommend immediate evaluation for any infertility measures that we could offer such as letrozole. Discussed that she may need to meet with Dr. Thaddeus Hatchet for consultation on medication management/fertility. Patient does verbalize that they would like to have another child but at this time they cannot afford to go back to infertility. I did discuss with patient that if she is ovulating it is hard to say if an ovulatory medication would be indicated and also would increase the risk of multiples which do not come without risk. Patient verbalized understanding she is going to start trying for pregnancy at home and see how things go and let the office know if she needs to come in to see Dr. Thaddeus Hatchet.    - Progesterone; Future        The patient, Marium Cochran , was seen with a total time spent of 35 minutes for the visit on this date of service by the [facility]  Both face-to-face (counseling and education) and non face-to-face time (care coordination), were spent in determining the total time component. This note is created with the assistance of a speech recognition program.  While intending to generate a document that actually reflects the content of the visit, the document can still have some errors including those of syntax and sound a like substitutions which may escape proof reading. In such instances, actual meaning can be extrapolated by contextual diversion. Return if symptoms worsen or fail to improve.     Electronically Signed byDAVIAN Carney CNM

## 2023-10-18 ENCOUNTER — OFFICE VISIT (OUTPATIENT)
Dept: OBGYN CLINIC | Age: 33
End: 2023-10-18
Payer: COMMERCIAL

## 2023-10-18 VITALS
WEIGHT: 260.13 LBS | DIASTOLIC BLOOD PRESSURE: 74 MMHG | SYSTOLIC BLOOD PRESSURE: 118 MMHG | HEIGHT: 66 IN | BODY MASS INDEX: 41.8 KG/M2

## 2023-10-18 DIAGNOSIS — N80.9 ENDOMETRIOSIS: ICD-10-CM

## 2023-10-18 DIAGNOSIS — N94.6 DYSMENORRHEA: Primary | ICD-10-CM

## 2023-10-18 PROCEDURE — 99213 OFFICE O/P EST LOW 20 MIN: CPT | Performed by: OBSTETRICS & GYNECOLOGY

## 2023-10-19 RX ORDER — MELOXICAM 15 MG/1
15 TABLET ORAL DAILY
Qty: 30 TABLET | Refills: 3 | Status: SHIPPED | OUTPATIENT
Start: 2023-10-19

## 2023-10-19 NOTE — PROGRESS NOTES
Maciel Ambrose  10/19/2023    YOB: 1990    The patient was seen today. She is here regarding painful and heavy periods. Her bowels are regular and she is voiding without difficulty. HPI:  Maciel Ambrose is a 34 y.o. female      H/o C/S 2022    She states that her periods have been much worse since her delivery. She states that she was at [facility] ER on 2023 for severe abdominal and pelvic pain. She states that the pain will increase prior to period and the day before period and first few days of period she is having excruciating, debilitating pain that leaves her bed bound and concerned for emergency. She is unable to complete ADLs. She states that her periods are heavy with large clots associated with the pain. She states September cycle was the worst and this last period 10/10/23 was severe but was not has bad as September and she is concerned. She takes NSAIDs around the clock from 3-4 days prior to period and during her period without any help. She is not currently on hormonal regulation. History of PCOS and has normal pelvic ultrasound 2022. Cycle is 28-33 days now and more regular that prior to delivery. Discussed all options in detail with R/B/A of each. Counseled on OCP, Mirena IUD, orilissa/myfembree, diagnostic laparoscopy. Her and her  have been talking about trying or pregnancy in the next 3-6 months. At this time she does not want to try anything that prevents pregnancy. Discussed pain control options and she is electing to try Mobic during her per-menstrual and menses.        OB History    Para Term  AB Living   1 1 1 0 0 1   SAB IAB Ectopic Molar Multiple Live Births   0 0 0 0 0 1      # Outcome Date GA Lbr Osiel/2nd Weight Sex Delivery Anes PTL Lv   1 Term 22 39w6d  3.5 kg (7 lb 11.5 oz) F CS-LTranv EPI N CHANDANA      Complications: Failure to Progress in First Stage      Name: Gerhard Drain

## 2023-11-29 ENCOUNTER — HOSPITAL ENCOUNTER (OUTPATIENT)
Age: 33
Setting detail: SPECIMEN
Discharge: HOME | End: 2023-11-29

## 2023-11-29 DIAGNOSIS — E28.2 PCOS (POLYCYSTIC OVARIAN SYNDROME): ICD-10-CM

## 2023-11-29 LAB — PROGEST SERPL-MCNC: 5.63 NG/ML

## 2023-12-12 ENCOUNTER — OFFICE VISIT (OUTPATIENT)
Dept: PRIMARY CARE CLINIC | Age: 33
End: 2023-12-12
Payer: COMMERCIAL

## 2023-12-12 ENCOUNTER — HOSPITAL ENCOUNTER (OUTPATIENT)
Age: 33
Setting detail: SPECIMEN
Discharge: HOME | End: 2023-12-12

## 2023-12-12 VITALS
SYSTOLIC BLOOD PRESSURE: 120 MMHG | DIASTOLIC BLOOD PRESSURE: 72 MMHG | HEART RATE: 76 BPM | OXYGEN SATURATION: 98 % | BODY MASS INDEX: 42.03 KG/M2 | RESPIRATION RATE: 12 BRPM | WEIGHT: 260.4 LBS

## 2023-12-12 DIAGNOSIS — E03.8 OTHER SPECIFIED HYPOTHYROIDISM: ICD-10-CM

## 2023-12-12 DIAGNOSIS — R73.01 IMPAIRED FASTING GLUCOSE: ICD-10-CM

## 2023-12-12 DIAGNOSIS — E28.2 PCOS (POLYCYSTIC OVARIAN SYNDROME): Primary | ICD-10-CM

## 2023-12-12 DIAGNOSIS — K76.9 LIVER LESION: ICD-10-CM

## 2023-12-12 LAB
EST. AVERAGE GLUCOSE BLD GHB EST-MCNC: 97 MG/DL
HBA1C MFR BLD: 5 % (ref 4–6)

## 2023-12-12 PROCEDURE — 99214 OFFICE O/P EST MOD 30 MIN: CPT | Performed by: FAMILY MEDICINE

## 2023-12-12 RX ORDER — LEVOTHYROXINE SODIUM 88 UG/1
TABLET ORAL
Qty: 90 TABLET | Refills: 3 | Status: SHIPPED | OUTPATIENT
Start: 2023-12-12

## 2023-12-12 RX ORDER — METFORMIN HYDROCHLORIDE 500 MG/1
TABLET, EXTENDED RELEASE ORAL
Qty: 120 TABLET | Refills: 3 | Status: SHIPPED | OUTPATIENT
Start: 2023-12-12

## 2023-12-12 ASSESSMENT — PATIENT HEALTH QUESTIONNAIRE - PHQ9
1. LITTLE INTEREST OR PLEASURE IN DOING THINGS: 0
2. FEELING DOWN, DEPRESSED OR HOPELESS: 0
SUM OF ALL RESPONSES TO PHQ QUESTIONS 1-9: 0
SUM OF ALL RESPONSES TO PHQ9 QUESTIONS 1 & 2: 0

## 2023-12-12 ASSESSMENT — ENCOUNTER SYMPTOMS
VOMITING: 0
SHORTNESS OF BREATH: 0

## 2023-12-12 NOTE — PROGRESS NOTES
[address]  PRIMARY CARE  [place] [address]  Dept: [phone]  Dept Fax: [phone]    Maren Francis is a 34 y.o. female who presents today for her medical conditions/complaints as noted below. Maren Francis is c/o of  Chief Complaint   Patient presents with    Medication Check         HPI:     HPI    Pt here for follow up on chronic conditions. PT was in ER September for abdominal pain- was on  period that time. Was thought possible endometriosis. Also notes was told possible kidney stone could have caused some pain. Did show liver lesion and they recommended follow up. Will get records from [facility]- states she was there  . She followed up with Gynecology and though possible scar tissue or endometriosis. Takes mobic when needed. On metformin for pcos and levothyroxine for hypothyroidism.      Hemoglobin A1C (%)   Date Value   08/10/2022 5.2   2021 5.5   2020 5.5             ( goal A1C is < 7)   No components found for: \"LABMICR\"  No results found for: \"LDLCHOLESTEROL\", \"LDLCALC\"    (goal LDL is <100)   AST (U/L)   Date Value   08/10/2022 21     ALT (U/L)   Date Value   08/10/2022 22     BUN (mg/dL)   Date Value   08/10/2022 9     BP Readings from Last 3 Encounters:   23 120/72   10/18/23 118/74   23 116/62          (goal 120/80)    Past Medical History:   Diagnosis Date    Anemia     Hypothyroidism 2020    Infertility, female     Migraines 2017    Previously on Imitrex but stopped once she found out she was pregnanct    PCOS (polycystic ovarian syndrome)     Pre-eclampsia 2022    While in labor      Past Surgical History:   Procedure Laterality Date     SECTION N/A 2022     SECTION performed by Rosalia Shetty DO at [facility] L&D OR    TONSILLECTOMY  2013    WISDOM TOOTH EXTRACTION  2011       Family History   Problem Relation Age of Onset    Diabetes

## 2023-12-15 ENCOUNTER — HOSPITAL ENCOUNTER (OUTPATIENT)
Age: 33
Setting detail: SPECIMEN
Discharge: HOME | End: 2023-12-15

## 2023-12-15 ENCOUNTER — OFFICE VISIT (OUTPATIENT)
Dept: OBGYN CLINIC | Age: 33
End: 2023-12-15
Payer: COMMERCIAL

## 2023-12-15 VITALS
WEIGHT: 260 LBS | HEIGHT: 66 IN | DIASTOLIC BLOOD PRESSURE: 60 MMHG | SYSTOLIC BLOOD PRESSURE: 120 MMHG | BODY MASS INDEX: 41.78 KG/M2

## 2023-12-15 DIAGNOSIS — Z12.31 ENCOUNTER FOR SCREENING MAMMOGRAM FOR MALIGNANT NEOPLASM OF BREAST: ICD-10-CM

## 2023-12-15 DIAGNOSIS — Z01.419 ENCOUNTER FOR ANNUAL ROUTINE GYNECOLOGICAL EXAMINATION: Primary | ICD-10-CM

## 2023-12-15 PROCEDURE — 99395 PREV VISIT EST AGE 18-39: CPT | Performed by: ADVANCED PRACTICE MIDWIFE

## 2023-12-15 ASSESSMENT — PATIENT HEALTH QUESTIONNAIRE - PHQ9
SUM OF ALL RESPONSES TO PHQ QUESTIONS 1-9: 0
SUM OF ALL RESPONSES TO PHQ9 QUESTIONS 1 & 2: 0
SUM OF ALL RESPONSES TO PHQ QUESTIONS 1-9: 0
1. LITTLE INTEREST OR PLEASURE IN DOING THINGS: 0
2. FEELING DOWN, DEPRESSED OR HOPELESS: 0

## 2023-12-15 ASSESSMENT — ANXIETY QUESTIONNAIRES
5. BEING SO RESTLESS THAT IT IS HARD TO SIT STILL: 0
4. TROUBLE RELAXING: 0
7. FEELING AFRAID AS IF SOMETHING AWFUL MIGHT HAPPEN: 0
6. BECOMING EASILY ANNOYED OR IRRITABLE: 0
2. NOT BEING ABLE TO STOP OR CONTROL WORRYING: 0
3. WORRYING TOO MUCH ABOUT DIFFERENT THINGS: 0
IF YOU CHECKED OFF ANY PROBLEMS ON THIS QUESTIONNAIRE, HOW DIFFICULT HAVE THESE PROBLEMS MADE IT FOR YOU TO DO YOUR WORK, TAKE CARE OF THINGS AT HOME, OR GET ALONG WITH OTHER PEOPLE: NOT DIFFICULT AT ALL
1. FEELING NERVOUS, ANXIOUS, OR ON EDGE: 0
GAD7 TOTAL SCORE: 0

## 2023-12-15 NOTE — PROGRESS NOTES
[address] OB/GYN [facility]   [facility] [place] [address]  Dept: [phone]    Patient Name: Cece Whiting  Patient Age: 34 y.o. Date of Visit: 12/15/2023    Subjective  Chief Complaint   Patient presents with    Gynecologic Exam     Patient's last menstrual period was 2023 (exact date). Chaperone for Intimate Exam  Chaperone was offered as part of the rooming process. Patient declined and agrees to continue with exam without a chaperone. Chaperone: n/a    HPI  Patient arrives for annual exam as a established patient to the practice, and established to me. Cece Whiting does not admit to any concerns today. Reported previous gyn history:  Menstrual cycles began at the age of 17  Cece Whiting reports having monthly menstrual cycles 30-34 days  Reports menstrual cycles usually last 3-5 days. Reports menstrual flow during cycle is moderate  Cece Whiting does report cramping with menstrual cycle  Cece Whiting does not currently have concerns about her menstrual cycle. Patient reports is  sexually active with 1 male partner(s). Patient denies  pain with sex. Reports is not protecting against a pregnancy. Current method(s) including is not preventing/not trying but not opposed if pregnancy occurs  Patient is not using a barrier method with sexual encounters   Patient denies a history of sexually transmitted infection(s)   If yes, including none  Patient does not want screening for sexually transmitted infection(s).    Cece Whiting requesting testing for n/a    Reported previous OB history:   Cece Whiting     Pertinent Chronic Health Conditions/Medications: Yes  Metformin  Mobic  Synthroid         12/15/2023     9:28 AM 2023     9:29 AM 2023    12:41 PM 2023    10:20 AM 2022    10:41 AM 8/10/2022     3:08 PM 2022     9:08 AM   PHQ Scores   PHQ2 Score 0 0 0 0 0 0 0   PHQ9 Score 0 0 0

## 2024-01-16 ENCOUNTER — HOSPITAL ENCOUNTER (OUTPATIENT)
Dept: MRI IMAGING | Age: 34
Discharge: HOME | End: 2024-01-18
Payer: COMMERCIAL

## 2024-01-16 DIAGNOSIS — K76.9 LIVER LESION: ICD-10-CM

## 2024-01-16 LAB
CREAT SERPL-MCNC: 0.6 MG/DL (ref 0.5–0.9)
GFR SERPL CREATININE-BSD FRML MDRD: >60 ML/MIN/1.73M2

## 2024-01-16 PROCEDURE — 74183 MRI ABD W/O CNTR FLWD CNTR: CPT

## 2024-01-16 PROCEDURE — 82565 ASSAY OF CREATININE: CPT

## 2024-01-16 PROCEDURE — 36415 COLL VENOUS BLD VENIPUNCTURE: CPT

## 2024-01-16 PROCEDURE — A9579 GAD-BASE MR CONTRAST NOS,1ML: HCPCS | Performed by: FAMILY MEDICINE

## 2024-01-16 PROCEDURE — 6360000004 HC RX CONTRAST MEDICATION: Performed by: FAMILY MEDICINE

## 2024-01-16 PROCEDURE — 2580000003 HC RX 258: Performed by: FAMILY MEDICINE

## 2024-01-16 RX ORDER — SODIUM CHLORIDE 0.9 % (FLUSH) 0.9 %
10 SYRINGE (ML) INJECTION PRN
Status: DISCONTINUED | OUTPATIENT
Start: 2024-01-16 | End: 2024-01-19 | Stop reason: HOSPADM

## 2024-01-16 RX ORDER — 0.9 % SODIUM CHLORIDE 0.9 %
80 INTRAVENOUS SOLUTION INTRAVENOUS ONCE
Status: COMPLETED | OUTPATIENT
Start: 2024-01-16 | End: 2024-01-16

## 2024-01-16 RX ADMIN — SODIUM CHLORIDE, PRESERVATIVE FREE 10 ML: 5 INJECTION INTRAVENOUS at 16:11

## 2024-01-16 RX ADMIN — SODIUM CHLORIDE 80 ML: 9 INJECTION, SOLUTION INTRAVENOUS at 16:11

## 2024-01-16 RX ADMIN — GADOTERIDOL 20 ML: 279.3 INJECTION, SOLUTION INTRAVENOUS at 16:11

## 2024-01-27 LAB
ALBUMIN SERPL-MCNC: 4.1 G/DL
ALP BLD-CCNC: 43 U/L
ALT SERPL-CCNC: 13 U/L
ANION GAP SERPL CALCULATED.3IONS-SCNC: 10 MMOL/L
AST SERPL-CCNC: 14 U/L
BASOPHILS ABSOLUTE: 0 /ΜL
BASOPHILS RELATIVE PERCENT: 0.4 %
BILIRUB SERPL-MCNC: 0.6 MG/DL (ref 0.1–1.4)
BUN BLDV-MCNC: 11 MG/DL
CALCIUM SERPL-MCNC: 9.2 MG/DL
CHLORIDE BLD-SCNC: 104 MMOL/L
CO2: 25 MMOL/L
CREAT SERPL-MCNC: 0.67 MG/DL
EGFR: 90
EOSINOPHILS ABSOLUTE: 0.1 /ΜL
EOSINOPHILS RELATIVE PERCENT: 1.2 %
GLUCOSE BLD-MCNC: 89 MG/DL
HCT VFR BLD CALC: 41 % (ref 36–46)
HEMOGLOBIN: 13.9 G/DL (ref 12–16)
LYMPHOCYTES ABSOLUTE: 2.7 /ΜL
LYMPHOCYTES RELATIVE PERCENT: 34.1 %
MCH RBC QN AUTO: 29.9 PG
MCHC RBC AUTO-ENTMCNC: 33.9 G/DL
MCV RBC AUTO: 88 FL
MONOCYTES ABSOLUTE: 0.6 /ΜL
MONOCYTES RELATIVE PERCENT: 7.2 %
NEUTROPHILS ABSOLUTE: 4.6 /ΜL
NEUTROPHILS RELATIVE PERCENT: 57.1 %
PDW BLD-RTO: 13.1 %
PLATELET # BLD: 400 K/ΜL
PMV BLD AUTO: 7.6 FL
POTASSIUM SERPL-SCNC: 4.2 MMOL/L
RBC # BLD: 4.65 10^6/ΜL
SODIUM BLD-SCNC: 139 MMOL/L
T4 FREE: 1.13
TOTAL PROTEIN: 7.5
TSH SERPL DL<=0.05 MIU/L-ACNC: 0.85 UIU/ML
VITAMIN D 25-HYDROXY: 36.5
VITAMIN D2, 25 HYDROXY: NORMAL
VITAMIN D3,25 HYDROXY: NORMAL
WBC # BLD: 8 10^3/ML

## 2024-01-29 DIAGNOSIS — E55.9 VITAMIN D DEFICIENCY: ICD-10-CM

## 2024-01-29 DIAGNOSIS — R53.83 OTHER FATIGUE: ICD-10-CM

## 2024-01-30 ENCOUNTER — HOSPITAL ENCOUNTER (OUTPATIENT)
Dept: MAMMOGRAPHY | Age: 34
Discharge: HOME | End: 2024-02-01
Payer: COMMERCIAL

## 2024-01-30 VITALS — WEIGHT: 260 LBS | HEIGHT: 66 IN | BODY MASS INDEX: 41.78 KG/M2

## 2024-01-30 DIAGNOSIS — Z12.31 ENCOUNTER FOR SCREENING MAMMOGRAM FOR MALIGNANT NEOPLASM OF BREAST: ICD-10-CM

## 2024-01-30 PROCEDURE — 77063 BREAST TOMOSYNTHESIS BI: CPT

## 2024-01-31 NOTE — RESULT ENCOUNTER NOTE
Patients mammogram (screening mammogram) results are benign, BR 2, and Recommend follow up/screening in 1 year.    My chart message sent to patient: Yes

## 2024-02-19 ENCOUNTER — OFFICE VISIT (OUTPATIENT)
Dept: OBGYN CLINIC | Age: 34
End: 2024-02-19
Payer: COMMERCIAL

## 2024-02-19 VITALS
HEIGHT: 66 IN | BODY MASS INDEX: 41.99 KG/M2 | DIASTOLIC BLOOD PRESSURE: 78 MMHG | SYSTOLIC BLOOD PRESSURE: 116 MMHG | WEIGHT: 261.25 LBS

## 2024-02-19 DIAGNOSIS — N94.6 DYSMENORRHEA: ICD-10-CM

## 2024-02-19 DIAGNOSIS — R10.2 PELVIC PAIN: ICD-10-CM

## 2024-02-19 DIAGNOSIS — Z31.69 ENCOUNTER FOR PRECONCEPTION CONSULTATION: ICD-10-CM

## 2024-02-19 DIAGNOSIS — N80.9 ENDOMETRIOSIS: Primary | ICD-10-CM

## 2024-02-19 DIAGNOSIS — E28.2 PCOS (POLYCYSTIC OVARIAN SYNDROME): ICD-10-CM

## 2024-02-19 PROCEDURE — 99213 OFFICE O/P EST LOW 20 MIN: CPT | Performed by: OBSTETRICS & GYNECOLOGY

## 2024-02-21 RX ORDER — PNV NO.95/FERROUS FUM/FOLIC AC 28MG-0.8MG
1 TABLET ORAL DAILY
Qty: 90 TABLET | Refills: 3 | Status: SHIPPED | OUTPATIENT
Start: 2024-02-21

## 2024-02-21 RX ORDER — FOLIC ACID 1 MG/1
1 TABLET ORAL DAILY
Qty: 90 TABLET | Refills: 1 | Status: SHIPPED | OUTPATIENT
Start: 2024-02-21

## 2024-02-22 PROBLEM — N80.9 ENDOMETRIOSIS: Status: ACTIVE | Noted: 2024-02-22

## 2024-02-22 PROBLEM — N94.6 DYSMENORRHEA: Status: ACTIVE | Noted: 2024-02-22

## 2024-03-27 ENCOUNTER — TELEPHONE (OUTPATIENT)
Dept: OBGYN CLINIC | Age: 34
End: 2024-03-27

## 2024-03-27 DIAGNOSIS — N91.2 AMENORRHEA: Primary | ICD-10-CM

## 2024-03-27 NOTE — TELEPHONE ENCOUNTER
Magda Alegria calling reporting a positive pregnancy test.    Magda Alegria is not a new patient.     This is not Magda Alegria's first pregnancy.    ** Please complete if patient has previous birth history, please delete is not applicable**  Number of pregnancies: 1  Mode of delivery (vaginal//both) CSECTION  If NEW patient please instruct patient will need prior OBGYN records.     Magda Alegria last menstrual cycle: 3/3/24  Based on LMP patient is 3W3D weeks     Dose patient have any concerns?   []YES  [x]NO  If yes, please discuss concern with provider to determine if patient needs additional appointment.      Scheduling Instructions and Education  [x]If patient less than 8 weeks please schedule missed menses (30 mins) between 6-8 weeks. ALSO scheduled USN w/ IPV (w/ NURSE) to follow 2-3 weeks after missed menses   Patient education: Initial missed menses appointment will consist of a pregnancy test and to establish care and review previous births **if applicable**. There will NOT be an ultrasound at this first visit. Please review that the USN and IPV visit will be 2-3 weeks after patient's missed menses. At this appointment dating ultrasound will be complete, prenatal labs ordered, prenatal education completed, pelvic exam if indicated     Please document appointments scheduled during phone call   Missed menses date/provider: 5/3 11:00 ELIUD  USN/IPV date:USN 5/10 9:00, IPV 10:00  PT REQUESTED HCG    Please forward telephone encounter to provider appointments are scheduled with prior to closing telephone encounter.

## 2024-03-29 ENCOUNTER — HOSPITAL ENCOUNTER (OUTPATIENT)
Age: 34
Setting detail: SPECIMEN
Discharge: HOME | End: 2024-03-29

## 2024-03-29 DIAGNOSIS — Z32.01 POSITIVE PREGNANCY TEST: ICD-10-CM

## 2024-03-29 LAB
B-HCG SERPL EIA 3RD IS-ACNC: 57 MIU/ML (ref 0–7)
PROGEST SERPL-MCNC: 18.3 NG/ML

## 2024-04-01 ENCOUNTER — TELEPHONE (OUTPATIENT)
Dept: OBGYN CLINIC | Age: 34
End: 2024-04-01

## 2024-04-01 DIAGNOSIS — N91.2 AMENORRHEA: Primary | ICD-10-CM

## 2024-04-01 NOTE — TELEPHONE ENCOUNTER
[facility] On [facility]/24 pt received a result of 57.    HCG order pending.

## 2024-04-02 ENCOUNTER — HOSPITAL ENCOUNTER (OUTPATIENT)
Age: 34
Setting detail: SPECIMEN
Discharge: HOME | End: 2024-04-02

## 2024-04-02 ENCOUNTER — TELEPHONE (OUTPATIENT)
Dept: OBGYN CLINIC | Age: 34
End: 2024-04-02

## 2024-04-02 DIAGNOSIS — N91.2 AMENORRHEA: ICD-10-CM

## 2024-04-02 LAB — B-HCG SERPL EIA 3RD IS-ACNC: 190 MIU/ML (ref 0–7)

## 2024-04-03 DIAGNOSIS — Z34.90 EARLY STAGE OF PREGNANCY: Primary | ICD-10-CM

## 2024-04-05 ENCOUNTER — TELEPHONE (OUTPATIENT)
Dept: OBGYN CLINIC | Age: 34
End: 2024-04-05

## 2024-04-05 NOTE — TELEPHONE ENCOUNTER
Pt went to urgent care last night and diagnosed with influenza B. Pt prescribed Tamiflu but symptoms started Sunday. Pt choosing to not take the Tamiflu.      For cough and cold:    -get rest, drink fluids  -wash your hands  -use saline nasal spray and Vicks  -gargle warm salt water and drink honey with lemon for sore throat  -Zinc lozenges and Vicks  -Claritin/Flonase  Pt will update our office if there is any change in your status.

## 2024-04-26 ASSESSMENT — PATIENT HEALTH QUESTIONNAIRE - PHQ9
SUM OF ALL RESPONSES TO PHQ QUESTIONS 1-9: 0
SUM OF ALL RESPONSES TO PHQ9 QUESTIONS 1 & 2: 0
SUM OF ALL RESPONSES TO PHQ QUESTIONS 1-9: 0
SUM OF ALL RESPONSES TO PHQ QUESTIONS 1-9: 0
2. FEELING DOWN, DEPRESSED OR HOPELESS: NOT AT ALL
SUM OF ALL RESPONSES TO PHQ9 QUESTIONS 1 & 2: 0
2. FEELING DOWN, DEPRESSED OR HOPELESS: NOT AT ALL
SUM OF ALL RESPONSES TO PHQ QUESTIONS 1-9: 0
1. LITTLE INTEREST OR PLEASURE IN DOING THINGS: NOT AT ALL
1. LITTLE INTEREST OR PLEASURE IN DOING THINGS: NOT AT ALL

## 2024-05-03 ENCOUNTER — HOSPITAL ENCOUNTER (OUTPATIENT)
Age: 34
Setting detail: SPECIMEN
Discharge: HOME | End: 2024-05-03

## 2024-05-03 ENCOUNTER — OFFICE VISIT (OUTPATIENT)
Dept: OBGYN CLINIC | Age: 34
End: 2024-05-03
Payer: COMMERCIAL

## 2024-05-03 VITALS
WEIGHT: 268 LBS | SYSTOLIC BLOOD PRESSURE: 110 MMHG | HEIGHT: 66 IN | DIASTOLIC BLOOD PRESSURE: 78 MMHG | BODY MASS INDEX: 43.07 KG/M2

## 2024-05-03 DIAGNOSIS — E28.2 PCOS (POLYCYSTIC OVARIAN SYNDROME): ICD-10-CM

## 2024-05-03 DIAGNOSIS — E03.9 HYPOTHYROIDISM, UNSPECIFIED TYPE: ICD-10-CM

## 2024-05-03 DIAGNOSIS — Z87.59 HISTORY OF PRE-ECLAMPSIA: ICD-10-CM

## 2024-05-03 DIAGNOSIS — R11.2 NAUSEA AND VOMITING, UNSPECIFIED VOMITING TYPE: ICD-10-CM

## 2024-05-03 DIAGNOSIS — Z98.891 HISTORY OF CESAREAN DELIVERY: ICD-10-CM

## 2024-05-03 DIAGNOSIS — N92.6 MISSED MENSES: Primary | ICD-10-CM

## 2024-05-03 PROBLEM — D64.9 ANEMIA: Status: RESOLVED | Noted: 2022-07-25 | Resolved: 2024-05-03

## 2024-05-03 PROBLEM — O09.90 HIGH-RISK PREGNANCY: Status: RESOLVED | Noted: 2022-04-21 | Resolved: 2024-05-03

## 2024-05-03 PROBLEM — E66.01 CLASS 3 SEVERE OBESITY DUE TO EXCESS CALORIES WITHOUT SERIOUS COMORBIDITY WITH BODY MASS INDEX (BMI) OF 45.0 TO 49.9 IN ADULT (HCC): Status: RESOLVED | Noted: 2022-04-21 | Resolved: 2024-05-03

## 2024-05-03 PROBLEM — R73.09 IMPAIRED GLUCOSE METABOLISM: Status: RESOLVED | Noted: 2022-03-04 | Resolved: 2024-05-03

## 2024-05-03 PROBLEM — Z23 NEED FOR DIPHTHERIA-TETANUS-PERTUSSIS (TDAP) VACCINE: Status: RESOLVED | Noted: 2022-04-22 | Resolved: 2024-05-03

## 2024-05-03 PROBLEM — E66.813 CLASS 3 SEVERE OBESITY DUE TO EXCESS CALORIES WITHOUT SERIOUS COMORBIDITY WITH BODY MASS INDEX (BMI) OF 45.0 TO 49.9 IN ADULT: Status: RESOLVED | Noted: 2022-04-21 | Resolved: 2024-05-03

## 2024-05-03 PROBLEM — Z98.890 POSTOPERATIVE STATE: Status: RESOLVED | Noted: 2022-07-25 | Resolved: 2024-05-03

## 2024-05-03 PROCEDURE — 99213 OFFICE O/P EST LOW 20 MIN: CPT | Performed by: ADVANCED PRACTICE MIDWIFE

## 2024-05-03 RX ORDER — PYRIDOXINE HCL (VITAMIN B6) 25 MG
25 TABLET ORAL 3 TIMES DAILY
Qty: 90 TABLET | Refills: 0 | Status: SHIPPED | OUTPATIENT
Start: 2024-05-03 | End: 2025-05-03

## 2024-05-03 ASSESSMENT — ANXIETY QUESTIONNAIRES
2. NOT BEING ABLE TO STOP OR CONTROL WORRYING: NOT AT ALL
4. TROUBLE RELAXING: NOT AT ALL
1. FEELING NERVOUS, ANXIOUS, OR ON EDGE: SEVERAL DAYS
5. BEING SO RESTLESS THAT IT IS HARD TO SIT STILL: NOT AT ALL
3. WORRYING TOO MUCH ABOUT DIFFERENT THINGS: SEVERAL DAYS
6. BECOMING EASILY ANNOYED OR IRRITABLE: NOT AT ALL
GAD7 TOTAL SCORE: 3
7. FEELING AFRAID AS IF SOMETHING AWFUL MIGHT HAPPEN: SEVERAL DAYS
IF YOU CHECKED OFF ANY PROBLEMS ON THIS QUESTIONNAIRE, HOW DIFFICULT HAVE THESE PROBLEMS MADE IT FOR YOU TO DO YOUR WORK, TAKE CARE OF THINGS AT HOME, OR GET ALONG WITH OTHER PEOPLE: NOT DIFFICULT AT ALL

## 2024-05-03 NOTE — PROGRESS NOTES
[facility] [place]  [address]  [address]  [place] [address]  Dept: [phone]    Missed Menses Intake    Subjective:    Patient Name:Magda Alegria  Patient Age: 34 y.o.  Date of Visit: 5/3/2024    Magda Alegria arrives for missed menses visit.   Magda Alegria had a positive pregnancy test   Magda Algeria does not have concerns.  Planned Pregnancy: Yes  Partner/FOB name: Phil  No LMP recorded., Regular menses: Yes  Estimate gestation age of LMP: 8w5d  Estimated due date based on LMP: 24  Patient Occupation: [employer]     OBGYN History:   Date of Last Pap Smear:  normal   Number of Pregnancies: 2  Number of Live Births: 1  [] Vaginal Birth  [x]  Birth (2022) prolonged labor, failed induction, no progession  [] Vaginal Birth after  delivery ()  [] History of High Risk Pregnancy(ies)  [] History of Birth Complications  [] Hx of infant with NICU stay    Past Medical History  Diabetes: No  Anxiety: Yes  Depression: No  Bipolar: No  High Blood Pressure:No  Stroke: No  Seizure: No  Deep Vein Thrombosis: No  Preeclampsia: Yes diagnosed while she was delivering in   Gestational Diabetes:No  Gestational Hypertension:No  Postpartum Hemorrhage: No  Bleeding Disorder: No  Sexually Transmitted Infections: No  Genital Herpes: No  History of chicken pox/varicella vaccine: Yes  Daily Medications: Yes  Listed in Epic    Past Family History (first degree relative)  Family history of blood clots: No  Family history of diabetes:Yes father and sister  Family history of factor V: No  Family history (mother/sister) of preeclampsia in a previous pregnancy: No    Early 1 Hour Glucose Screening  [x] BMI 30 or greater (if marked, positive screen)  Risk Factors (need more than 1 if BMI less than 30)  [] Physical inactivity   [x] First degree relative with diabetes (yes Father)  [] High- risk race or ethnicity 
MINUTES 90 tablet 3    Prenatal Vit-Fe Fumarate-FA (PRENATAL VITAMINS PO) Take 1 tablet by mouth daily       No current facility-administered medications for this visit.         OBJECTIVE:    /78 (Site: Right Upper Arm, Position: Sitting, Cuff Size: Medium Adult)   Ht 1.676 m (5' 6\")   Wt 121.6 kg (268 lb)   LMP 2024 (Exact Date)   BMI 43.26 kg/m²       She is complaining today of:   Pain: No  Cramping: No  Bleeding or spotting: No    Nausea: Yes  Vomiting: No    Breast enlargement/tenderness: No  Fatigue: No  Frequency of urination: No    Previous high risk obstetrical history: hx c/s  OB History    Para Term  AB Living   2 1 1 0 0 1   SAB IAB Ectopic Molar Multiple Live Births   0 0 0   0 1      # Outcome Date GA Lbr Osiel/2nd Weight Sex Delivery Anes PTL Lv   2 Current            1 Term 22 39w6d  3.5 kg (7 lb 11.5 oz) F CS-LTranv EPI N CHANDANA      Complications: Failure to Progress in First Stage       History was reviewed as documented on Epic Navigator.    ASSESSMENT/PLAN:  Missed Menses  - Patient is taking prenatal vitamins Yes  - Ultrasound for dating and viability was ordered: Yes   USN today 9w0d 2 days different. Dating off LMP  - OB form was given: Yes  - Initial information on genetic testing was given:Yes   Magda Alegria Accepted and first trimester screening  - Reviewed routine tests done at new OB visit and patient verbally consents to urine drug screen and HIV screening at New OB Visit  - 1 hour glucola needed at OB history: Yes  - She was instructed to call with any concerns or worsening of any symptoms  - She will return for New OB intake visit  High Risk Factors Identified for this pregnancy:   Hx c/s x1  Hx PCOS  Early 1 hour AFTER d/c metformin at 13 weeks  Hypothyroid   TSH q4-6 weeks   Hx of preE  Baseline labs  Baby asa after 12 weeks    1. Missed menses    2. Nausea and vomiting, unspecified vomiting type  - pyridoxine (B-6) 25 MG tablet; Take 1 tablet by

## 2024-05-04 DIAGNOSIS — N92.6 MISSED MENSES: ICD-10-CM

## 2024-05-04 LAB
AMPHET UR QL SCN: NEGATIVE
BARBITURATES UR QL SCN: NEGATIVE
BENZODIAZ UR QL: NEGATIVE
CANNABINOIDS UR QL SCN: NEGATIVE
COCAINE UR QL SCN: NEGATIVE
FENTANYL UR QL: NEGATIVE
METHADONE UR QL: NEGATIVE
OPIATES UR QL SCN: NEGATIVE
OXYCODONE UR QL SCN: NEGATIVE
PCP UR QL SCN: NEGATIVE
TEST INFORMATION: NORMAL

## 2024-05-06 LAB
CHLAMYDIA DNA UR QL NAA+PROBE: NEGATIVE
N GONORRHOEA DNA UR QL NAA+PROBE: NEGATIVE
SPECIMEN DESCRIPTION: NORMAL

## 2024-05-10 ENCOUNTER — INITIAL PRENATAL (OUTPATIENT)
Dept: OBGYN CLINIC | Age: 34
End: 2024-05-10

## 2024-05-10 ENCOUNTER — HOSPITAL ENCOUNTER (OUTPATIENT)
Age: 34
Setting detail: SPECIMEN
Discharge: HOME | End: 2024-05-10

## 2024-05-10 VITALS
SYSTOLIC BLOOD PRESSURE: 128 MMHG | HEART RATE: 80 BPM | DIASTOLIC BLOOD PRESSURE: 70 MMHG | WEIGHT: 268.2 LBS | BODY MASS INDEX: 43.29 KG/M2

## 2024-05-10 DIAGNOSIS — Z3A.09 9 WEEKS GESTATION OF PREGNANCY: Primary | ICD-10-CM

## 2024-05-10 DIAGNOSIS — Z3A.09 9 WEEKS GESTATION OF PREGNANCY: ICD-10-CM

## 2024-05-10 LAB
ABO + RH BLD: NORMAL
ALBUMIN SERPL-MCNC: 4.3 G/DL (ref 3.5–5.2)
ALBUMIN/GLOB SERPL: 1 {RATIO} (ref 1–2.5)
ALP SERPL-CCNC: 43 U/L (ref 35–104)
ALT SERPL-CCNC: 10 U/L (ref 10–35)
ANION GAP SERPL CALCULATED.3IONS-SCNC: 13 MMOL/L (ref 9–16)
AST SERPL-CCNC: 19 U/L (ref 10–35)
BASOPHILS # BLD: 0.05 K/UL (ref 0–0.2)
BASOPHILS NFR BLD: 1 % (ref 0–2)
BILIRUB SERPL-MCNC: 0.3 MG/DL (ref 0–1.2)
BLOOD GROUP ANTIBODIES SERPL: NEGATIVE
BUN SERPL-MCNC: 7 MG/DL (ref 6–20)
CALCIUM SERPL-MCNC: 9.4 MG/DL (ref 8.6–10.4)
CHLORIDE SERPL-SCNC: 102 MMOL/L (ref 98–107)
CO2 SERPL-SCNC: 21 MMOL/L (ref 20–31)
CREAT SERPL-MCNC: 0.6 MG/DL (ref 0.5–0.9)
EOSINOPHIL # BLD: 0.11 K/UL (ref 0–0.44)
EOSINOPHILS RELATIVE PERCENT: 1 % (ref 1–4)
ERYTHROCYTE [DISTWIDTH] IN BLOOD BY AUTOMATED COUNT: 12.8 % (ref 11.8–14.4)
GFR, ESTIMATED: >90 ML/MIN/1.73M2
GLUCOSE SERPL-MCNC: 78 MG/DL (ref 74–99)
HBV SURFACE AG SERPL QL IA: NONREACTIVE
HCT VFR BLD AUTO: 45.1 % (ref 36.3–47.1)
HCV AB SERPL QL IA: NONREACTIVE
HGB BLD-MCNC: 14.5 G/DL (ref 11.9–15.1)
HIV 1+2 AB+HIV1 P24 AG SERPL QL IA: NONREACTIVE
IMM GRANULOCYTES # BLD AUTO: 0.03 K/UL (ref 0–0.3)
IMM GRANULOCYTES NFR BLD: 0 %
LYMPHOCYTES NFR BLD: 2.16 K/UL (ref 1.1–3.7)
LYMPHOCYTES RELATIVE PERCENT: 24 % (ref 24–43)
MCH RBC QN AUTO: 29.5 PG (ref 25.2–33.5)
MCHC RBC AUTO-ENTMCNC: 32.2 G/DL (ref 28.4–34.8)
MCV RBC AUTO: 91.9 FL (ref 82.6–102.9)
MONOCYTES NFR BLD: 0.56 K/UL (ref 0.1–1.2)
MONOCYTES NFR BLD: 6 % (ref 3–12)
NEUTROPHILS NFR BLD: 68 % (ref 36–65)
NEUTS SEG NFR BLD: 6.1 K/UL (ref 1.5–8.1)
NRBC BLD-RTO: 0 PER 100 WBC
PLATELET # BLD AUTO: 373 K/UL (ref 138–453)
PMV BLD AUTO: 9.7 FL (ref 8.1–13.5)
POTASSIUM SERPL-SCNC: 4.6 MMOL/L (ref 3.7–5.3)
PROT SERPL-MCNC: 7.2 G/DL (ref 6.6–8.7)
RBC # BLD AUTO: 4.91 M/UL (ref 3.95–5.11)
RUBV IGG SERPL QL IA: 56.4 IU/ML
SODIUM SERPL-SCNC: 136 MMOL/L (ref 136–145)
T PALLIDUM AB SER QL IA: NONREACTIVE
TSH SERPL DL<=0.05 MIU/L-ACNC: 0.58 UIU/ML (ref 0.27–4.2)
WBC OTHER # BLD: 9 K/UL (ref 3.5–11.3)

## 2024-05-10 SDOH — ECONOMIC STABILITY: FOOD INSECURITY: WITHIN THE PAST 12 MONTHS, YOU WORRIED THAT YOUR FOOD WOULD RUN OUT BEFORE YOU GOT MONEY TO BUY MORE.: NEVER TRUE

## 2024-05-10 SDOH — ECONOMIC STABILITY: FOOD INSECURITY: WITHIN THE PAST 12 MONTHS, THE FOOD YOU BOUGHT JUST DIDN'T LAST AND YOU DIDN'T HAVE MONEY TO GET MORE.: NEVER TRUE

## 2024-05-10 SDOH — ECONOMIC STABILITY: TRANSPORTATION INSECURITY
IN THE PAST 12 MONTHS, HAS LACK OF TRANSPORTATION KEPT YOU FROM MEETINGS, WORK, OR FROM GETTING THINGS NEEDED FOR DAILY LIVING?: NO

## 2024-05-10 SDOH — ECONOMIC STABILITY: HOUSING INSECURITY: IN THE LAST 12 MONTHS, HOW MANY PLACES HAVE YOU LIVED?: 1

## 2024-05-10 SDOH — ECONOMIC STABILITY: INCOME INSECURITY: IN THE LAST 12 MONTHS, WAS THERE A TIME WHEN YOU WERE NOT ABLE TO PAY THE MORTGAGE OR RENT ON TIME?: NO

## 2024-05-10 SDOH — ECONOMIC STABILITY: TRANSPORTATION INSECURITY
IN THE PAST 12 MONTHS, HAS THE LACK OF TRANSPORTATION KEPT YOU FROM MEDICAL APPOINTMENTS OR FROM GETTING MEDICATIONS?: NO

## 2024-05-10 ASSESSMENT — SOCIAL DETERMINANTS OF HEALTH (SDOH)
WITHIN THE LAST YEAR, HAVE YOU BEEN HUMILIATED OR EMOTIONALLY ABUSED IN OTHER WAYS BY YOUR PARTNER OR EX-PARTNER?: NO
HOW HARD IS IT FOR YOU TO PAY FOR THE VERY BASICS LIKE FOOD, HOUSING, MEDICAL CARE, AND HEATING?: NOT HARD AT ALL
WITHIN THE LAST YEAR, HAVE YOU BEEN KICKED, HIT, SLAPPED, OR OTHERWISE PHYSICALLY HURT BY YOUR PARTNER OR EX-PARTNER?: NO
WITHIN THE LAST YEAR, HAVE TO BEEN RAPED OR FORCED TO HAVE ANY KIND OF SEXUAL ACTIVITY BY YOUR PARTNER OR EX-PARTNER?: NO
WITHIN THE LAST YEAR, HAVE YOU BEEN AFRAID OF YOUR PARTNER OR EX-PARTNER?: NO

## 2024-05-10 ASSESSMENT — ANXIETY QUESTIONNAIRES
6. BECOMING EASILY ANNOYED OR IRRITABLE: SEVERAL DAYS
2. NOT BEING ABLE TO STOP OR CONTROL WORRYING: SEVERAL DAYS
3. WORRYING TOO MUCH ABOUT DIFFERENT THINGS: NOT AT ALL
1. FEELING NERVOUS, ANXIOUS, OR ON EDGE: SEVERAL DAYS
IF YOU CHECKED OFF ANY PROBLEMS ON THIS QUESTIONNAIRE, HOW DIFFICULT HAVE THESE PROBLEMS MADE IT FOR YOU TO DO YOUR WORK, TAKE CARE OF THINGS AT HOME, OR GET ALONG WITH OTHER PEOPLE: NOT DIFFICULT AT ALL
7. FEELING AFRAID AS IF SOMETHING AWFUL MIGHT HAPPEN: SEVERAL DAYS
4. TROUBLE RELAXING: NOT AT ALL
GAD7 TOTAL SCORE: 4
5. BEING SO RESTLESS THAT IT IS HARD TO SIT STILL: NOT AT ALL

## 2024-05-10 NOTE — PROGRESS NOTES
succinate (GNP SLEEP AID) 25 MG tablet Take 1 tablet by mouth nightly 30 tablet 0    folic acid (FOLVITE) 1 MG tablet Take 1 tablet by mouth daily 90 tablet 1    metFORMIN (GLUCOPHAGE-XR) 500 MG extended release tablet TAKE FOUR TABLETS BY MOUTH DAILY WITH BREAKFAST 120 tablet 3    levothyroxine (SYNTHROID) 88 MCG tablet TAKE 1 TABLET BY MOUTH IN THE MORNING IMMEDIATELY UPON ARISING, DELAY EATING/DRINKING FOR 30 MINUTES 90 tablet 3    Prenatal Vit-Fe Fumarate-FA (PRENATAL VITAMINS PO) Take 1 tablet by mouth daily      Prenatal Vit-Fe Fumarate-FA (PRENATAL VITAMINS) 28-0.8 MG TABS Take 1 tablet by mouth daily (Patient not taking: Reported on 5/10/2024) 90 tablet 3     No current facility-administered medications for this visit.     No Known Allergies    Information about this pregnancy:   Planned Pregnancy: Yes, Accepting: Yes  Father of Baby: RAYMOND and is involved with the pregnancy  Patient's last menstrual period was 03/03/2024 (exact date)., Regular menses: Yes  Date of Last Pap: was normal 2023  On Birth Control at Time of Conception: no  Early Dating Ultrasound completed: yes - 5/10/24    Risk Screening  Patient Occupation: [employer] , Environmental/Work Hazards: No  Smoker: No  History of Substance Abuse: no  History of Sexual Abuse: no  Current of past history of intimate partner violence: no  Pets at home: yes - 1 dog  See Epic Navigator for full Infection Genetic Risk Screening.   Positive screening results from Navigator: NO    OB Genetic Screening    Patient's Age 35+ at Date of Delivery No     Cystic Fibrosis No     Thalassemia MCV<80 No     Anne Chorea No     Neural Tube Defect No     Mental Retardation/Autism No     Congenital Heart Defect No     Was Person Treated for Fragilex? No     Down Syndrome No     Other Inherited Genetic Chromosomal Disorder? No     Jefry-Sachs No     Maternal Metabolic Disorder No     Canavan Disease No     Patient or Baby's Father Had Other Defects?

## 2024-05-11 DIAGNOSIS — Z3A.09 9 WEEKS GESTATION OF PREGNANCY: ICD-10-CM

## 2024-05-12 LAB
MICROORGANISM SPEC CULT: NORMAL
SPECIMEN DESCRIPTION: NORMAL

## 2024-05-13 ENCOUNTER — TELEPHONE (OUTPATIENT)
Dept: OBGYN CLINIC | Age: 34
End: 2024-05-13

## 2024-05-13 LAB — VZV IGG SER QL IA: 1.99

## 2024-05-13 NOTE — TELEPHONE ENCOUNTER
Patient is 10 weeks- works as a . She is asking for modified work duties. She stated once a month they go to the [employer] range, and have to carry a bunch of equipment that she is not comfortable doing while being pregnant. She spoke with her boss, and they are ok as long as she gets a note excusing her of these job duties. Ok to write with current pregnancy lifting restrictions?     Please send back to Sandie or Tara, as I am not back in until 5/15

## 2024-05-14 NOTE — TELEPHONE ENCOUNTER
Called pt to inform that we medically can not say she can not wear her belt with gun- it is safe to do so but we can give a work note with lifting and carrying restrictions-     Pt stated that she no longer needs the note and she did talk with her boss .

## 2024-05-15 DIAGNOSIS — E28.2 PCOS (POLYCYSTIC OVARIAN SYNDROME): ICD-10-CM

## 2024-05-15 NOTE — TELEPHONE ENCOUNTER
Last Visit Date: 12/12/2023   Next Visit Date: 6/12/2024    Upon assessment pt has pain in her left calf- no pain in upper leg.

## 2024-05-17 DIAGNOSIS — E28.2 PCOS (POLYCYSTIC OVARIAN SYNDROME): ICD-10-CM

## 2024-05-17 DIAGNOSIS — Z3A.10 10 WEEKS GESTATION OF PREGNANCY: Primary | ICD-10-CM

## 2024-05-17 RX ORDER — METFORMIN HYDROCHLORIDE 500 MG/1
500 TABLET, EXTENDED RELEASE ORAL
Qty: 120 TABLET | Refills: 0
Start: 2024-05-17

## 2024-05-17 NOTE — TELEPHONE ENCOUNTER
Patient states that gynecology is aware of her taking metformin and is ok with it    Last Visit Date: 12/12/2023   Next Visit Date: 6/12/2024   Pharmacy: [facility] in [place]

## 2024-05-17 NOTE — TELEPHONE ENCOUNTER
Pt called and stated that PCP unable to fill her Metformin.    Last appt 5/10/24  Next appt:6/7/24

## 2024-05-18 RX ORDER — METFORMIN HYDROCHLORIDE 500 MG/1
TABLET, EXTENDED RELEASE ORAL
Qty: 120 TABLET | Refills: 3 | OUTPATIENT
Start: 2024-05-18

## 2024-05-18 RX ORDER — METFORMIN HYDROCHLORIDE 500 MG/1
TABLET, EXTENDED RELEASE ORAL
Qty: 120 TABLET | Refills: 3 | Status: SHIPPED | OUTPATIENT
Start: 2024-05-18

## 2024-05-29 ENCOUNTER — ROUTINE PRENATAL (OUTPATIENT)
Dept: PERINATAL CARE | Age: 34
End: 2024-05-29
Payer: COMMERCIAL

## 2024-05-29 ENCOUNTER — HOSPITAL ENCOUNTER (OUTPATIENT)
Age: 34
Setting detail: SPECIMEN
Discharge: HOME | End: 2024-05-29

## 2024-05-29 VITALS
BODY MASS INDEX: 42.87 KG/M2 | HEIGHT: 66 IN | HEART RATE: 97 BPM | WEIGHT: 266.76 LBS | TEMPERATURE: 98.1 F | DIASTOLIC BLOOD PRESSURE: 77 MMHG | RESPIRATION RATE: 16 BRPM | SYSTOLIC BLOOD PRESSURE: 121 MMHG

## 2024-05-29 DIAGNOSIS — E03.9 HYPOTHYROIDISM AFFECTING PREGNANCY IN FIRST TRIMESTER: ICD-10-CM

## 2024-05-29 DIAGNOSIS — O99.281 HYPOTHYROIDISM AFFECTING PREGNANCY IN FIRST TRIMESTER: ICD-10-CM

## 2024-05-29 DIAGNOSIS — Z36.9 FIRST TRIMESTER SCREENING: Primary | ICD-10-CM

## 2024-05-29 DIAGNOSIS — O36.80X0 ENCOUNTER TO DETERMINE FETAL VIABILITY OF PREGNANCY, SINGLE OR UNSPECIFIED FETUS: ICD-10-CM

## 2024-05-29 DIAGNOSIS — O99.211 OBESITY AFFECTING PREGNANCY IN FIRST TRIMESTER, UNSPECIFIED OBESITY TYPE: ICD-10-CM

## 2024-05-29 DIAGNOSIS — O09.291 HISTORY OF PRE-ECLAMPSIA IN PRIOR PREGNANCY, CURRENTLY PREGNANT IN FIRST TRIMESTER: ICD-10-CM

## 2024-05-29 DIAGNOSIS — Z3A.12 12 WEEKS GESTATION OF PREGNANCY: ICD-10-CM

## 2024-05-29 PROCEDURE — 76801 OB US < 14 WKS SINGLE FETUS: CPT | Performed by: OBSTETRICS & GYNECOLOGY

## 2024-05-29 PROCEDURE — 99999 PR OFFICE/OUTPT VISIT,PROCEDURE ONLY: CPT | Performed by: OBSTETRICS & GYNECOLOGY

## 2024-05-29 PROCEDURE — 76813 OB US NUCHAL MEAS 1 GEST: CPT | Performed by: OBSTETRICS & GYNECOLOGY

## 2024-05-29 NOTE — PROGRESS NOTES
Obstetric/Gynecology Maternal Fetal Medicine Resident Note      Patient declines formal consult with MFM attending physician for Hypothyroid and history of Preeclampsia w/o Severe features.     Ekta Bustillo DO  OBGYMAXIMO Resident, PGY1  [facility]  5/29/2024, 12:22 PM

## 2024-05-29 NOTE — PROGRESS NOTES
I would advise initiation of daily oral baby aspirin 81 mg based on guidelines by the USPSTF/ACOG (for preeclampsia prevention for pregnant women at \"high-risk\"  for preeclampsia).        Advise early 1-hour glucola (if not already done) to evaluate for pregestational diabetes (first degree relatives with diabetes).     Patient declines a Maternal-Fetal Medicine complete physician consultation today regarding the fetal and/or maternal medical/obstetrical complications/co-morbidities of pregnancy.      Maternal-Fetal Medicine (MFM) attending physician will defer all management for these medical/obstetrical complications of pregnancy to the primary attending obstetrical physician/provider, as a result.  Therefore, only an ultrasound evaluation was completed today in the MFM office.      Please refer to Maternal-Fetal Medicine OBGYN resident progress note in EPIC.

## 2024-06-01 LAB
AFP INTERPRETATION: NORMAL
AFP SPECIMEN: NORMAL
CRL: 67.6 MM
CROWN RUMP LENGTH TWIN B: NORMAL MM
CROWN RUMP LENGTH: 67.6
DATE OF BIRTH: NORMAL
DONOR EGG?: NORMAL
GESTATIONAL AGE: NORMAL
HX OF HEREDITARY DISORDERS: NORMAL
IN VITRO FERTILIZATION: NORMAL
MATERNAL AGE AT EDD: 32.9 YR
MATERNAL SCREEN, EER: NORMAL
MATERNAL WEIGHT: 266
MOM FOR NT, TWIN B: NORMAL
MOM FOR NT: 1.45
MOM FOR PAPP-A: 1.27
MONOCHORIONIC TWINS: NORMAL
MSHCG-MOM: 0.94
MSHCG: NORMAL IU/L
NUCHAL TRANSLUC (NT): 2.4
NUCHAL TRANSLUC (NT): 2.4 MM
NUCHAL TRANSLUCENCY TWIN B: NORMAL MM
NUMBER OF FETUSES: 1
NUMBER OF FETUSES: NORMAL
PAPP-A MOM: 484.8 NG/ML
PATIENT WEIGHT UNITS: NORMAL
PATIENT WEIGHT: NORMAL
PREV TRISOMY PREG: NORMAL
RACE (MATERNAL): NORMAL
RACE: NORMAL
READING MD CERT NUM: NORMAL
READING MD NAME: NORMAL
REPEAT SPECIMEN?: NORMAL
SMOKING: NORMAL
SMOKING: NORMAL
SONOGRAPHER CERT NO: NORMAL
SONOGRAPHER CERT NO: NORMAL
SONOGRAPHER NAME: NORMAL
SONOGRAPHER NAME: NORMAL
ULTRASOUND DATE: NORMAL
ULTRASOUND DATE: NORMAL

## 2024-06-07 ENCOUNTER — ROUTINE PRENATAL (OUTPATIENT)
Dept: OBGYN CLINIC | Age: 34
End: 2024-06-07

## 2024-06-07 VITALS
WEIGHT: 265 LBS | BODY MASS INDEX: 42.59 KG/M2 | DIASTOLIC BLOOD PRESSURE: 78 MMHG | SYSTOLIC BLOOD PRESSURE: 124 MMHG | HEIGHT: 66 IN

## 2024-06-07 DIAGNOSIS — O99.282 HYPOTHYROIDISM AFFECTING PREGNANCY IN SECOND TRIMESTER: ICD-10-CM

## 2024-06-07 DIAGNOSIS — Z87.42 HISTORY OF PCOS: ICD-10-CM

## 2024-06-07 DIAGNOSIS — E03.9 HYPOTHYROIDISM AFFECTING PREGNANCY IN SECOND TRIMESTER: ICD-10-CM

## 2024-06-07 DIAGNOSIS — Z3A.13 13 WEEKS GESTATION OF PREGNANCY: ICD-10-CM

## 2024-06-07 DIAGNOSIS — O99.212 OBESITY AFFECTING PREGNANCY IN SECOND TRIMESTER, UNSPECIFIED OBESITY TYPE: ICD-10-CM

## 2024-06-07 DIAGNOSIS — O09.92 HRP (HIGH RISK PREGNANCY), SECOND TRIMESTER: Primary | ICD-10-CM

## 2024-06-07 PROCEDURE — 0502F SUBSEQUENT PRENATAL CARE: CPT | Performed by: ADVANCED PRACTICE MIDWIFE

## 2024-06-07 NOTE — PROGRESS NOTES
Chaperone for Intimate Exam  Chaperone was offered as part of the rooming process. Patient declined and agrees to continue with exam without a chaperone.  Chaperone: n/a

## 2024-06-07 NOTE — PROGRESS NOTES
SUBJECTIVE:    Magda is here for her return OB visit. denies concerns today.   She denies  feeling fetal movement.  She denies vaginal bleeding.  She denies vaginal discharge.  She denies leaking of fluid.  She denies uterine cramping.  She denies  nausea and/or vomiting.    OBJECTIVE:  Blood pressure 124/78, height 1.676 m (5' 6\"), weight 120.2 kg (265 lb), last menstrual period 03/03/2024, not currently breastfeeding.    Total weight gain: 1.814 kg (4 lb)      ASSESSMENT/PLAN:  IUP @ 13+5 weeks  S=D    High Risk Pregnancy  Due date is based on LMP and confirmed with 8w5d early dating ultrasound  Patient's prenatal labs are completed.  Patient's blood type O positive and rhogam is not indicated in the pregnancy.   Early glucola indicated: yes  Completed: No  Patient Accepted  genetic screening.   Accepted and first trimester screeningand test(s) are low risk for trisomy 18/21 (FTS)  Anatomy scan scheduled 8/1/24   Total weight gain in pregnancy reviewed: Yes      2. 13 weeks gestation of pregnancy  - reviewed warning signs   - discussed baby asa w/ hx of preE    3. Obesity affecting pregnancy in second trimester, unspecified obesity type  - early 1 hour ordered going to do now that she stopped metformin    4. Hypothyroidism affecting pregnancy in second trimester  - reviewed checking q4 weeks and is agreeable  - TSH With Reflex Ft4; Future    5. History of PCOS  - stopped metformin        She was counseled regarding all of the above:    Return in about 4 weeks (around 7/5/2024) for Return OB.    The patient, Magda Alegria was seen for 25 minutes were spent on this encounter on the date of service by the provider.       Electronically Signed By: DAVIAN Rivera CNM

## 2024-06-10 ENCOUNTER — HOSPITAL ENCOUNTER (OUTPATIENT)
Age: 34
Setting detail: SPECIMEN
Discharge: HOME | End: 2024-06-10

## 2024-06-10 DIAGNOSIS — Z3A.09 9 WEEKS GESTATION OF PREGNANCY: ICD-10-CM

## 2024-06-10 DIAGNOSIS — O99.282 HYPOTHYROIDISM AFFECTING PREGNANCY IN SECOND TRIMESTER: ICD-10-CM

## 2024-06-10 DIAGNOSIS — E03.9 HYPOTHYROIDISM AFFECTING PREGNANCY IN SECOND TRIMESTER: ICD-10-CM

## 2024-06-10 LAB
CREAT UR-MCNC: 283 MG/DL (ref 28–217)
GLUCOSE 1H P 50 G GLC PO SERPL-MCNC: 109 MG/DL (ref 70–135)
GLUCOSE ADMINISTRATION: NORMAL
TOTAL PROTEIN, URINE: 23 MG/DL
TSH SERPL DL<=0.05 MIU/L-ACNC: 0.45 UIU/ML (ref 0.27–4.2)
URINE TOTAL PROTEIN CREATININE RATIO: 0.08

## 2024-07-05 ENCOUNTER — ROUTINE PRENATAL (OUTPATIENT)
Dept: OBGYN CLINIC | Age: 34
End: 2024-07-05

## 2024-07-05 VITALS
WEIGHT: 269.38 LBS | DIASTOLIC BLOOD PRESSURE: 72 MMHG | SYSTOLIC BLOOD PRESSURE: 116 MMHG | BODY MASS INDEX: 43.48 KG/M2

## 2024-07-05 DIAGNOSIS — O09.93 HIGH-RISK PREGNANCY IN THIRD TRIMESTER: ICD-10-CM

## 2024-07-05 DIAGNOSIS — E03.9 HYPOTHYROIDISM, UNSPECIFIED TYPE: ICD-10-CM

## 2024-07-05 DIAGNOSIS — Z3A.17 17 WEEKS GESTATION OF PREGNANCY: Primary | ICD-10-CM

## 2024-07-05 DIAGNOSIS — Z98.891 HISTORY OF CESAREAN SECTION: ICD-10-CM

## 2024-07-05 DIAGNOSIS — E66.01 MORBID OBESITY WITH BMI OF 40.0-44.9, ADULT (HCC): ICD-10-CM

## 2024-07-05 PROCEDURE — 99024 POSTOP FOLLOW-UP VISIT: CPT | Performed by: STUDENT IN AN ORGANIZED HEALTH CARE EDUCATION/TRAINING PROGRAM

## 2024-07-05 NOTE — PROGRESS NOTES
repeat c/s   Hx of preE  - cmp/cbc/p/c ratio at IPV  - baby asa at 13 weeks  - P:C 0.08  Pregravid BMI 43  - early 1 hour   - FS at 34 weeks  Hypothyroidism  - synthroid 88mcg  - TSH q4-6 weeks   PCOS  -metformin d/c at 13 weeks THEN complete early 1 hour glucose    [x]Placed on High Risk List 5/3/2024 Cheryle Milan, DAVIAN - TRUPTI     Fetal Surveillance:  [x] Yes 34wk BMI   [] No    Covid Vaccine:recommended  Tdap:  []Given **  [] Declined **  Rhogam:  []Given   [x] Not indicated     Early 1 hr GTT: passed  1hr GTT:  [] Results **  3hr GTT:    The problem list reflects the active issues addressed during today's visit    Patient Active Problem List    Diagnosis Date Noted    Migraine without aura and without status migrainosus, not intractable 08/10/2022     Priority: Medium    Chronic fatigue 08/10/2022     Priority: Medium    Vitamin D deficiency 08/10/2022     Priority: Medium    PLTCS 22 Apg 8/9 Wt 7#11 2022     Priority: Medium    Preeclampsia w/o SF's (G1) 2022     Priority: Medium    COVID-19 vaccine administered 2022     Priority: Medium    History of  section 2024                                                                    Endometriosis 2024    Dysmenorrhea 2024    Hypothyroid 2022    Anxiety 2017     No meds      PCOS      Was previously on metformin      GERD 2011     No follow-ups on file.    DO Veronica Batrlett OBGYMAXIMO  [address]    [id number]  [facility], [id number]  2024, 12:23 PM

## 2024-07-08 ENCOUNTER — TELEPHONE (OUTPATIENT)
Dept: OBGYN CLINIC | Age: 34
End: 2024-07-08

## 2024-07-08 DIAGNOSIS — R35.0 FREQUENCY OF URINATION: Primary | ICD-10-CM

## 2024-07-08 NOTE — TELEPHONE ENCOUNTER
Pt having frequency with urination but not burning, no urgency. Pt is staying hydrated, when drinks more water pressure is worse.    No fever or chills    UC pending

## 2024-07-10 ENCOUNTER — HOSPITAL ENCOUNTER (OUTPATIENT)
Age: 34
Setting detail: SPECIMEN
Discharge: HOME | End: 2024-07-10

## 2024-07-10 DIAGNOSIS — Z3A.17 17 WEEKS GESTATION OF PREGNANCY: ICD-10-CM

## 2024-07-10 DIAGNOSIS — R35.0 FREQUENCY OF URINATION: ICD-10-CM

## 2024-07-10 DIAGNOSIS — E03.9 HYPOTHYROIDISM, UNSPECIFIED TYPE: ICD-10-CM

## 2024-07-10 LAB
BACTERIA URNS QL MICRO: ABNORMAL
BILIRUB UR QL STRIP: NEGATIVE
CASTS #/AREA URNS LPF: ABNORMAL /LPF (ref 0–8)
CLARITY UR: ABNORMAL
COLOR UR: YELLOW
EPI CELLS #/AREA URNS HPF: ABNORMAL /HPF (ref 0–5)
GLUCOSE UR STRIP-MCNC: NEGATIVE MG/DL
HGB UR QL STRIP.AUTO: NEGATIVE
KETONES UR STRIP-MCNC: NEGATIVE MG/DL
LEUKOCYTE ESTERASE UR QL STRIP: NEGATIVE
NITRITE UR QL STRIP: NEGATIVE
PH UR STRIP: 7 [PH] (ref 5–8)
PROT UR STRIP-MCNC: NEGATIVE MG/DL
RBC #/AREA URNS HPF: ABNORMAL /HPF (ref 0–4)
SP GR UR STRIP: 1.01 (ref 1–1.03)
TSH SERPL DL<=0.05 MIU/L-ACNC: 0.92 UIU/ML (ref 0.27–4.2)
UROBILINOGEN UR STRIP-ACNC: NORMAL EU/DL (ref 0–1)
WBC #/AREA URNS HPF: ABNORMAL /HPF (ref 0–5)

## 2024-07-12 LAB
MICROORGANISM SPEC CULT: ABNORMAL
SERVICE CMNT-IMP: ABNORMAL
SPECIMEN DESCRIPTION: ABNORMAL

## 2024-07-12 RX ORDER — NITROFURANTOIN 25; 75 MG/1; MG/1
100 CAPSULE ORAL 2 TIMES DAILY
Qty: 14 CAPSULE | Refills: 0 | Status: SHIPPED | OUTPATIENT
Start: 2024-07-12 | End: 2024-07-19

## 2024-07-22 ENCOUNTER — TELEPHONE (OUTPATIENT)
Dept: OBGYN CLINIC | Age: 34
End: 2024-07-22

## 2024-07-22 DIAGNOSIS — Z3A.20 20 WEEKS GESTATION OF PREGNANCY: Primary | ICD-10-CM

## 2024-07-22 NOTE — TELEPHONE ENCOUNTER
Pt having occasional dizzy spells, bruising easily, tachycardic occasionally. Pt staying hydrated drinking electrolytes, pt resting, pt eating protein. No pain or burning w/urination. Next appt 8/5/24. Please advise.

## 2024-07-23 NOTE — TELEPHONE ENCOUNTER
Pt left voicemail, she would like labs to check iron because she is having dizzy spells and bruising easy + fatigue.

## 2024-07-24 ENCOUNTER — HOSPITAL ENCOUNTER (OUTPATIENT)
Age: 34
Setting detail: SPECIMEN
Discharge: HOME | End: 2024-07-24

## 2024-07-24 DIAGNOSIS — Z3A.20 20 WEEKS GESTATION OF PREGNANCY: ICD-10-CM

## 2024-07-24 LAB
ALBUMIN SERPL-MCNC: 3.8 G/DL (ref 3.5–5.2)
ALBUMIN/GLOB SERPL: 1 {RATIO} (ref 1–2.5)
ALP SERPL-CCNC: 47 U/L (ref 35–104)
ALT SERPL-CCNC: 7 U/L (ref 10–35)
ANION GAP SERPL CALCULATED.3IONS-SCNC: 10 MMOL/L (ref 9–16)
AST SERPL-CCNC: 12 U/L (ref 10–35)
BILIRUB SERPL-MCNC: 0.3 MG/DL (ref 0–1.2)
BUN SERPL-MCNC: 6 MG/DL (ref 6–20)
CALCIUM SERPL-MCNC: 8.9 MG/DL (ref 8.6–10.4)
CHLORIDE SERPL-SCNC: 105 MMOL/L (ref 98–107)
CO2 SERPL-SCNC: 20 MMOL/L (ref 20–31)
CREAT SERPL-MCNC: 0.6 MG/DL (ref 0.5–0.9)
ERYTHROCYTE [DISTWIDTH] IN BLOOD BY AUTOMATED COUNT: 12.4 % (ref 11.8–14.4)
GFR, ESTIMATED: >90 ML/MIN/1.73M2
GLUCOSE SERPL-MCNC: 73 MG/DL (ref 74–99)
HCT VFR BLD AUTO: 39.5 % (ref 36.3–47.1)
HGB BLD-MCNC: 13.1 G/DL (ref 11.9–15.1)
IRON SATN MFR SERPL: 16 % (ref 20–55)
IRON SERPL-MCNC: 81 UG/DL (ref 37–145)
MCH RBC QN AUTO: 29.8 PG (ref 25.2–33.5)
MCHC RBC AUTO-ENTMCNC: 33.2 G/DL (ref 28.4–34.8)
MCV RBC AUTO: 90 FL (ref 82.6–102.9)
NRBC BLD-RTO: 0 PER 100 WBC
PLATELET # BLD AUTO: 371 K/UL (ref 138–453)
PMV BLD AUTO: 9.7 FL (ref 8.1–13.5)
POTASSIUM SERPL-SCNC: 4.1 MMOL/L (ref 3.7–5.3)
PROT SERPL-MCNC: 7.2 G/DL (ref 6.6–8.7)
RBC # BLD AUTO: 4.39 M/UL (ref 3.95–5.11)
SODIUM SERPL-SCNC: 135 MMOL/L (ref 136–145)
TIBC SERPL-MCNC: 494 UG/DL (ref 250–450)
UNSATURATED IRON BINDING CAPACITY: 413 UG/DL (ref 112–347)
WBC OTHER # BLD: 10.5 K/UL (ref 3.5–11.3)

## 2024-07-25 ENCOUNTER — TELEPHONE (OUTPATIENT)
Dept: OBGYN CLINIC | Age: 34
End: 2024-07-25

## 2024-07-25 RX ORDER — SENNOSIDES 8.6 MG
1 TABLET ORAL DAILY
Qty: 120 TABLET | Refills: 0 | Status: SHIPPED | OUTPATIENT
Start: 2024-07-25

## 2024-07-25 NOTE — TELEPHONE ENCOUNTER
Pt called to check on if her labs were resulted yet. Pt states she is still having dizzy spells. See telephone encounter from 7/22/24.

## 2024-08-01 ENCOUNTER — ROUTINE PRENATAL (OUTPATIENT)
Dept: PERINATAL CARE | Age: 34
End: 2024-08-01
Payer: COMMERCIAL

## 2024-08-01 VITALS
DIASTOLIC BLOOD PRESSURE: 68 MMHG | RESPIRATION RATE: 16 BRPM | BODY MASS INDEX: 44.03 KG/M2 | HEART RATE: 95 BPM | TEMPERATURE: 97.9 F | WEIGHT: 274 LBS | HEIGHT: 66 IN | SYSTOLIC BLOOD PRESSURE: 112 MMHG

## 2024-08-01 DIAGNOSIS — Z3A.21 21 WEEKS GESTATION OF PREGNANCY: ICD-10-CM

## 2024-08-01 DIAGNOSIS — O99.212 OBESITY AFFECTING PREGNANCY IN SECOND TRIMESTER, UNSPECIFIED OBESITY TYPE: ICD-10-CM

## 2024-08-01 DIAGNOSIS — E03.9 HYPOTHYROIDISM AFFECTING PREGNANCY IN SECOND TRIMESTER: Primary | ICD-10-CM

## 2024-08-01 DIAGNOSIS — O99.282 HYPOTHYROIDISM AFFECTING PREGNANCY IN SECOND TRIMESTER: Primary | ICD-10-CM

## 2024-08-01 DIAGNOSIS — Z36.86 ENCOUNTER FOR SCREENING FOR RISK OF PRE-TERM LABOR: ICD-10-CM

## 2024-08-01 DIAGNOSIS — O34.219 PREVIOUS CESAREAN DELIVERY, ANTEPARTUM CONDITION OR COMPLICATION: ICD-10-CM

## 2024-08-01 DIAGNOSIS — O09.292 HISTORY OF PRE-ECLAMPSIA IN PRIOR PREGNANCY, CURRENTLY PREGNANT IN SECOND TRIMESTER: ICD-10-CM

## 2024-08-01 PROCEDURE — 76817 TRANSVAGINAL US OBSTETRIC: CPT | Performed by: OBSTETRICS & GYNECOLOGY

## 2024-08-01 PROCEDURE — 76811 OB US DETAILED SNGL FETUS: CPT | Performed by: OBSTETRICS & GYNECOLOGY

## 2024-08-01 PROCEDURE — 99999 PR OFFICE/OUTPT VISIT,PROCEDURE ONLY: CPT | Performed by: OBSTETRICS & GYNECOLOGY

## 2024-08-01 NOTE — PROGRESS NOTES
Obstetric/Gynecology Maternal Fetal Medicine Resident Note    Patient declines formal consult with MFM attending physician for hypothyroid and history of preeclampsia.     Ekta Bustillo DO  OBGYN Resident, PGY2  [facility]  8/1/2024, 11:13 AM

## 2024-08-01 NOTE — PROGRESS NOTES
I would advise initiation of daily oral baby aspirin 81 mg based on guidelines by the USPSTF/ACOG (for preeclampsia prevention for pregnant women at \"high-risk\"  for preeclampsia).        Advise repeat 1-hour glucola between 24-28 weeks' gestation to evaluate for gestational diabetes.     Options with respect to offering the patient maternal carrier genetic screening and MSAFP screen could not be completed today, as the patient again declines a Maternal-Fetal Medicine physician consultation today.     Patient again declines a Maternal-Fetal Medicine complete physician consultation today regarding the fetal and/or maternal medical/obstetrical complications/co-morbidities of pregnancy.      Maternal-Fetal Medicine (MFM) attending physician will defer all management for these medical/obstetrical complications of pregnancy to the primary attending obstetrical physician/provider, as a result.  Therefore, only an ultrasound evaluation was completed today in the MFM office.      Please refer to Maternal-Fetal Medicine OBGYN resident progress note in EPIC.

## 2024-08-05 ENCOUNTER — ROUTINE PRENATAL (OUTPATIENT)
Dept: OBGYN CLINIC | Age: 34
End: 2024-08-05

## 2024-08-05 ENCOUNTER — HOSPITAL ENCOUNTER (OUTPATIENT)
Age: 34
Setting detail: SPECIMEN
Discharge: HOME | End: 2024-08-05

## 2024-08-05 VITALS
DIASTOLIC BLOOD PRESSURE: 74 MMHG | BODY MASS INDEX: 44.03 KG/M2 | HEIGHT: 66 IN | WEIGHT: 274 LBS | SYSTOLIC BLOOD PRESSURE: 118 MMHG

## 2024-08-05 DIAGNOSIS — O46.92 VAGINAL BLEEDING IN PREGNANCY, SECOND TRIMESTER: ICD-10-CM

## 2024-08-05 DIAGNOSIS — Z3A.22 22 WEEKS GESTATION OF PREGNANCY: ICD-10-CM

## 2024-08-05 DIAGNOSIS — E66.01 MORBID OBESITY WITH BMI OF 40.0-44.9, ADULT (HCC): ICD-10-CM

## 2024-08-05 DIAGNOSIS — E03.9 HYPOTHYROIDISM, UNSPECIFIED TYPE: ICD-10-CM

## 2024-08-05 DIAGNOSIS — Z98.891 HISTORY OF CESAREAN SECTION: ICD-10-CM

## 2024-08-05 DIAGNOSIS — O09.92 HIGH-RISK PREGNANCY IN SECOND TRIMESTER: Primary | ICD-10-CM

## 2024-08-05 LAB
BILIRUB UR QL STRIP: NEGATIVE
CLARITY UR: CLEAR
COLOR UR: YELLOW
COMMENT: NORMAL
GLUCOSE UR STRIP-MCNC: NEGATIVE MG/DL
HGB UR QL STRIP.AUTO: NEGATIVE
KETONES UR STRIP-MCNC: NEGATIVE MG/DL
LEUKOCYTE ESTERASE UR QL STRIP: NEGATIVE
NITRITE UR QL STRIP: NEGATIVE
PH UR STRIP: 6 [PH] (ref 5–8)
PROT UR STRIP-MCNC: NEGATIVE MG/DL
SP GR UR STRIP: 1.02 (ref 1–1.03)
UROBILINOGEN UR STRIP-ACNC: NORMAL EU/DL (ref 0–1)

## 2024-08-05 PROCEDURE — 0502F SUBSEQUENT PRENATAL CARE: CPT | Performed by: NURSE PRACTITIONER

## 2024-08-05 NOTE — PROGRESS NOTES
Chaperone for Intimate Exam  Chaperone was offered as part of the rooming process. Patient declined and agrees to continue with exam without a chaperone.  Chaperone: n/a      
Decreased fetal movement, vaginal Bleeding or hemorrhage, trauma, readily expectant delivery, or any instance where she feels 911 should be utilized.      Of the 30 minute duration appointment visit, Negra Hamilton CNP spent at least 50% of the face-to-face time in counseling, explanation of diagnosis, planning of further management, and answering all questions.

## 2024-08-06 LAB
CANDIDA SPECIES: NEGATIVE
GARDNERELLA VAGINALIS: NEGATIVE
MICROORGANISM SPEC CULT: NORMAL
SERVICE CMNT-IMP: NORMAL
SOURCE: NORMAL
SPECIMEN DESCRIPTION: NORMAL
TRICHOMONAS: NEGATIVE

## 2024-08-16 ENCOUNTER — HOSPITAL ENCOUNTER (OUTPATIENT)
Age: 34
Setting detail: SPECIMEN
Discharge: HOME | End: 2024-08-16

## 2024-08-16 DIAGNOSIS — E03.9 HYPOTHYROIDISM, UNSPECIFIED TYPE: ICD-10-CM

## 2024-08-16 DIAGNOSIS — Z3A.17 17 WEEKS GESTATION OF PREGNANCY: ICD-10-CM

## 2024-08-16 LAB — TSH SERPL DL<=0.05 MIU/L-ACNC: 0.66 UIU/ML (ref 0.27–4.2)

## 2024-09-03 ENCOUNTER — HOSPITAL ENCOUNTER (OUTPATIENT)
Age: 34
Setting detail: SPECIMEN
Discharge: HOME | End: 2024-09-03

## 2024-09-03 DIAGNOSIS — Z3A.22 22 WEEKS GESTATION OF PREGNANCY: ICD-10-CM

## 2024-09-03 DIAGNOSIS — O09.92 HIGH-RISK PREGNANCY IN SECOND TRIMESTER: ICD-10-CM

## 2024-09-03 LAB
BASOPHILS # BLD: 0.03 K/UL (ref 0–0.2)
BASOPHILS NFR BLD: 0 % (ref 0–2)
EOSINOPHIL # BLD: 0.07 K/UL (ref 0–0.44)
EOSINOPHILS RELATIVE PERCENT: 1 % (ref 1–4)
ERYTHROCYTE [DISTWIDTH] IN BLOOD BY AUTOMATED COUNT: 13 % (ref 11.8–14.4)
GLUCOSE 1H P 50 G GLC PO SERPL-MCNC: 108 MG/DL (ref 70–135)
GLUCOSE ADMINISTRATION: NORMAL
HCT VFR BLD AUTO: 37.6 % (ref 36.3–47.1)
HGB BLD-MCNC: 11.9 G/DL (ref 11.9–15.1)
IMM GRANULOCYTES # BLD AUTO: 0.03 K/UL (ref 0–0.3)
IMM GRANULOCYTES NFR BLD: 0 %
LYMPHOCYTES NFR BLD: 2.19 K/UL (ref 1.1–3.7)
LYMPHOCYTES RELATIVE PERCENT: 25 % (ref 24–43)
MCH RBC QN AUTO: 29.1 PG (ref 25.2–33.5)
MCHC RBC AUTO-ENTMCNC: 31.6 G/DL (ref 28.4–34.8)
MCV RBC AUTO: 91.9 FL (ref 82.6–102.9)
MONOCYTES NFR BLD: 0.57 K/UL (ref 0.1–1.2)
MONOCYTES NFR BLD: 6 % (ref 3–12)
NEUTROPHILS NFR BLD: 68 % (ref 36–65)
NEUTS SEG NFR BLD: 5.99 K/UL (ref 1.5–8.1)
NRBC BLD-RTO: 0 PER 100 WBC
PLATELET # BLD AUTO: 342 K/UL (ref 138–453)
PMV BLD AUTO: 9.6 FL (ref 8.1–13.5)
RBC # BLD AUTO: 4.09 M/UL (ref 3.95–5.11)
WBC OTHER # BLD: 8.9 K/UL (ref 3.5–11.3)

## 2024-09-04 ENCOUNTER — ROUTINE PRENATAL (OUTPATIENT)
Dept: OBGYN CLINIC | Age: 34
End: 2024-09-04

## 2024-09-04 VITALS
DIASTOLIC BLOOD PRESSURE: 76 MMHG | BODY MASS INDEX: 45.09 KG/M2 | WEIGHT: 279.38 LBS | SYSTOLIC BLOOD PRESSURE: 114 MMHG

## 2024-09-04 DIAGNOSIS — Z3A.26 26 WEEKS GESTATION OF PREGNANCY: Primary | ICD-10-CM

## 2024-09-04 PROCEDURE — 0502F SUBSEQUENT PRENATAL CARE: CPT | Performed by: OBSTETRICS & GYNECOLOGY

## 2024-09-16 ENCOUNTER — ROUTINE PRENATAL (OUTPATIENT)
Dept: PERINATAL CARE | Age: 34
End: 2024-09-16
Payer: COMMERCIAL

## 2024-09-16 ENCOUNTER — HOSPITAL ENCOUNTER (OUTPATIENT)
Age: 34
Setting detail: SPECIMEN
Discharge: HOME | End: 2024-09-16

## 2024-09-16 VITALS
HEART RATE: 98 BPM | TEMPERATURE: 98.6 F | RESPIRATION RATE: 16 BRPM | DIASTOLIC BLOOD PRESSURE: 80 MMHG | BODY MASS INDEX: 45.64 KG/M2 | HEIGHT: 66 IN | WEIGHT: 284 LBS | SYSTOLIC BLOOD PRESSURE: 118 MMHG

## 2024-09-16 DIAGNOSIS — O99.213 OBESITY AFFECTING PREGNANCY IN THIRD TRIMESTER, UNSPECIFIED OBESITY TYPE: ICD-10-CM

## 2024-09-16 DIAGNOSIS — E03.9 HYPOTHYROIDISM, UNSPECIFIED TYPE: ICD-10-CM

## 2024-09-16 DIAGNOSIS — Z3A.17 17 WEEKS GESTATION OF PREGNANCY: ICD-10-CM

## 2024-09-16 DIAGNOSIS — O09.293 HISTORY OF PRE-ECLAMPSIA IN PRIOR PREGNANCY, CURRENTLY PREGNANT IN THIRD TRIMESTER: ICD-10-CM

## 2024-09-16 DIAGNOSIS — Z36.4 ULTRASOUND FOR ANTENATAL SCREENING FOR FETAL GROWTH RESTRICTION: ICD-10-CM

## 2024-09-16 DIAGNOSIS — Z3A.28 28 WEEKS GESTATION OF PREGNANCY: ICD-10-CM

## 2024-09-16 DIAGNOSIS — O34.219 PREVIOUS CESAREAN DELIVERY, ANTEPARTUM CONDITION OR COMPLICATION: ICD-10-CM

## 2024-09-16 DIAGNOSIS — O99.283 HYPOTHYROIDISM AFFECTING PREGNANCY IN THIRD TRIMESTER: Primary | ICD-10-CM

## 2024-09-16 DIAGNOSIS — E03.9 HYPOTHYROIDISM AFFECTING PREGNANCY IN THIRD TRIMESTER: Primary | ICD-10-CM

## 2024-09-16 LAB — TSH SERPL DL<=0.05 MIU/L-ACNC: 0.73 UIU/ML (ref 0.27–4.2)

## 2024-09-16 PROCEDURE — 76816 OB US FOLLOW-UP PER FETUS: CPT | Performed by: OBSTETRICS & GYNECOLOGY

## 2024-09-16 PROCEDURE — 76819 FETAL BIOPHYS PROFIL W/O NST: CPT | Performed by: OBSTETRICS & GYNECOLOGY

## 2024-09-16 PROCEDURE — 99999 PR OFFICE/OUTPT VISIT,PROCEDURE ONLY: CPT | Performed by: OBSTETRICS & GYNECOLOGY

## 2024-10-04 ENCOUNTER — ROUTINE PRENATAL (OUTPATIENT)
Dept: OBGYN CLINIC | Age: 34
End: 2024-10-04

## 2024-10-04 VITALS — BODY MASS INDEX: 46.32 KG/M2 | WEIGHT: 287 LBS | SYSTOLIC BLOOD PRESSURE: 122 MMHG | DIASTOLIC BLOOD PRESSURE: 60 MMHG

## 2024-10-04 DIAGNOSIS — Z23 NEED FOR TDAP VACCINATION: ICD-10-CM

## 2024-10-04 DIAGNOSIS — Z23 FLU VACCINE NEED: ICD-10-CM

## 2024-10-04 DIAGNOSIS — E66.813 CLASS 3 SEVERE OBESITY DUE TO EXCESS CALORIES WITH BODY MASS INDEX (BMI) OF 45.0 TO 49.9 IN ADULT, UNSPECIFIED WHETHER SERIOUS COMORBIDITY PRESENT: ICD-10-CM

## 2024-10-04 DIAGNOSIS — E28.2 PCOS (POLYCYSTIC OVARIAN SYNDROME): ICD-10-CM

## 2024-10-04 DIAGNOSIS — E66.01 CLASS 3 SEVERE OBESITY DUE TO EXCESS CALORIES WITH BODY MASS INDEX (BMI) OF 45.0 TO 49.9 IN ADULT, UNSPECIFIED WHETHER SERIOUS COMORBIDITY PRESENT: ICD-10-CM

## 2024-10-04 DIAGNOSIS — E03.9 HYPOTHYROIDISM, UNSPECIFIED TYPE: ICD-10-CM

## 2024-10-04 DIAGNOSIS — O09.93 HIGH-RISK PREGNANCY IN THIRD TRIMESTER: ICD-10-CM

## 2024-10-04 DIAGNOSIS — Z3A.30 30 WEEKS GESTATION OF PREGNANCY: Primary | ICD-10-CM

## 2024-10-04 DIAGNOSIS — Z98.891 HISTORY OF CESAREAN SECTION: ICD-10-CM

## 2024-10-04 PROBLEM — R53.82 CHRONIC FATIGUE: Status: RESOLVED | Noted: 2022-08-10 | Resolved: 2024-10-04

## 2024-10-04 RX ORDER — MAGNESIUM OXIDE 400 MG/1
400 TABLET ORAL DAILY
Qty: 30 TABLET | Refills: 1 | Status: SHIPPED | OUTPATIENT
Start: 2024-10-04

## 2024-10-04 NOTE — PROGRESS NOTES
Prenatal Visit    Magda Alegria is a 34 y.o. female  at 30w5d    The patient was seen and evaluated. She has no complaints. There was positive fetal movements. She denies contractions, vaginal bleeding and leakage of fluid. Signs and symptoms of  labor as well as labor were reviewed. The S/S of Pre-Eclampsia were reviewed with the patient in detail. She is to report any of these if they occur. She currently denies any of these.    Discussed resident involvement in triage and labor and delivery process. Discussed call group. All questions answered. Patient verbalized understanding and agreement.     Vitals:  /60   Wt 130.2 kg (287 lb)   LMP 2024 (Exact Date)   BMI 46.32 kg/m²       Assessment & Plan:  Magda Alegria is a 34 y.o. female  at 30w5d   - 28 week labs completed   - Influenza and Covid vaccinations recommended per ACOG: R/B/A discussed with increased risk of both maternal and fetal morbidity and mortality in unvaccinated pregnant patients.    - TDAP and RSV vaccinations recommended per ACOG. R/B/A discussed. She understands must received RSV vaccine at pharmacy.   - Continue prenatal vitamin   - pregnancy support band   - tdap and flu today   - US for FHT due to failed attempt with doppler   - unable to assess fundal height due to body habitus > has [facility] growth follow up      Insurance: [facility]    FOB Name: Phil    Last Pap Smear: 2023, negative  [x] Urine collected for culture   [x] Negative date 5/10/24   [] Positive date   [x] UDS collected > negative   [x] Gc/ct collected > negative  [x] Prenatal Labs completed     Genetic Screening:  [x]Accepted  FTS 5/3/2024 DAVIAN Rivera CNM   [x]Completed  [x] Low risk       Carrier Screening:  [x] Undecided 5/3/2024 DAVIAN Rivera CNM   [] Accepted   [] Declined  [] Known negative CF/SMA/Fragile X  [] Results **    Baby aspirin screen:  [x]Positive (hx preE)  []Negative    Early 1 hour

## 2024-10-04 NOTE — PROGRESS NOTES
Vaccine Information Sheet, \"Influenza - Inactivated\"  given to Magda Alegria, or parent/legal guardian of  Magda Alegria and verbalized understanding.    Patient responses:    Have you ever had a reaction to a flu vaccine? No  Are you able to eat eggs without adverse effects?  Yes  Do you have any current illness?  No  Have you ever had Guillian Hahira Syndrome?  No    Flu vaccine given per order. Please see immunization tab.     The patient requested to have the TDAP vaccine. Consent obtained. Injection of 0.5mL given Left deltoid Intramuscular.  Lot #:[id number]  EXP:11/20/26  Patient tolerated well. The patient requested to have the Flu vaccine. Consent obtained. Injection of 0.5mL given Left deltoid Intramuscular.  Lot #:[id number]  EXP:6/17/2025  Patient tolerated well.

## 2024-10-14 ENCOUNTER — ROUTINE PRENATAL (OUTPATIENT)
Dept: OBGYN CLINIC | Age: 34
End: 2024-10-14

## 2024-10-14 VITALS — BODY MASS INDEX: 46.65 KG/M2 | DIASTOLIC BLOOD PRESSURE: 78 MMHG | WEIGHT: 289 LBS | SYSTOLIC BLOOD PRESSURE: 124 MMHG

## 2024-10-14 DIAGNOSIS — O09.93 HIGH-RISK PREGNANCY IN THIRD TRIMESTER: Primary | ICD-10-CM

## 2024-10-14 DIAGNOSIS — Z3A.32 32 WEEKS GESTATION OF PREGNANCY: ICD-10-CM

## 2024-10-14 DIAGNOSIS — Z98.891 HISTORY OF CESAREAN SECTION: ICD-10-CM

## 2024-10-14 DIAGNOSIS — E03.9 HYPOTHYROIDISM, UNSPECIFIED TYPE: ICD-10-CM

## 2024-10-14 DIAGNOSIS — O99.213 OBESITY AFFECTING PREGNANCY IN THIRD TRIMESTER, UNSPECIFIED OBESITY TYPE: ICD-10-CM

## 2024-10-14 PROCEDURE — 0502F SUBSEQUENT PRENATAL CARE: CPT | Performed by: NURSE PRACTITIONER

## 2024-10-14 NOTE — PATIENT INSTRUCTIONS
and go to all appointments, and call your doctor if you are having problems. It's also a good idea to know your test results and keep a list of the medicines you take.  How can you care for yourself at home?  Take and record your blood pressure at home if your doctor tells you to.  Learn the importance of the two measures of blood pressure (such as 120 over 80, or 120/80). The first number is the systolic pressure, which is the force of blood on the artery walls as the heart pumps. The second number is the diastolic pressure, which is the force of blood on the artery walls between heartbeats, when the heart is at rest. You have a choice of monitors to use.  Manual monitor: You pump up the cuff and use a stethoscope to listen for your pulse.  Electronic monitor: The cuff inflates, and a gauge shows your pulse rate.  To take your blood pressure:  Ask your doctor to check your blood pressure monitor to be sure that it is accurate and that the cuff fits you. Also ask your doctor to watch you to make sure that you are using it right.  You should not eat, use tobacco products, or use medicine known to raise blood pressure (such as some nasal decongestant sprays) before you take your blood pressure.  Avoid taking your blood pressure if you have just exercised. Also avoid taking it if you are nervous or upset. Rest at least 15 minutes before you take your blood pressure.  If your doctor advises, check the protein levels in your urine. Your doctor or nurse will show you how to do this.  Take your medicines exactly as prescribed. Call your doctor if you think you are having a problem with your medicine.  Do not smoke. Quitting smoking will help improve your baby's growth and health. If you need help quitting, talk to your doctor about stop-smoking programs and medicines. These can increase your chances of quitting for good.  Eat a balanced and healthy diet that has lots of fruits and vegetables.  If your doctor advised bed

## 2024-10-14 NOTE — PROGRESS NOTES
Presents for OB visit  Gestation 32w1d  Estimated Date of Delivery: 24    Insurance: [employer]    IPV bag/mug given:  Genetic Information given/reviewed: 5/3/2024 DAVIAN Rivera CNM   1st trimester education packet given: 5/3/2024 DAVIAN Rivera CNM   2nd trimester education packet given:  3rd trimester education packet given:      FOB Name: Phil    Last Pap Smear: 2023, negative  [x] Urine collected for culture   [x] Negative date 5/10/24   [] Positive date   [x] UDS collected > negative   [x] Gc/ct collected > negative  [x] Prenatal Labs completed     Genetic Screening:  [x]Accepted  FTS 5/3/2024 DAVIAN Rivera CNM   [x]Completed  [x] Low risk       Carrier Screening:  [x] Undecided 5/3/2024 DAVIAN Rivera CNM   [] Accepted   [] Declined  [] Known negative CF/SMA/Fragile X  [] Results **    Baby aspirin screen:  [x]Positive (hx preE)  []Negative    Early 1 hour GTT:  [x]Yes (BMI/PCOS) passed 109  [] No    AFP: declined 24  []Ordered  []Completed    Anatomy scan:  [x]Referral Sent  [x]Scheduled 24  [x]Completed-overall quality of ultrasound was suboptimal given maternal body habitus and fetal lie however no obvious evidence of congenital birth defects.  Fetal size appropriate for gestational age.  Cervical length was reassuring no evidence of vasa previa or placental previa/low-lying placenta.  [x] Follow up-[facility] recommended follow-up ultrasound at 26 weeks growth BPP given fetal/maternal medical/obstetrical complications of pregnancy    Fetal Echo:   [] Yes  [x] No    High Risk Factors:  Hx of C/S x1  - failure to progress  - leaning towards repeat c/s   - Planning repeat CS > Dec 2-  - Does not desires b/l RRS  Hx of preE  - cmp/cbc/p/c ratio at IPV  - baby asa at 13 weeks  - P:C 0.08  Pregravid BMI 43  - early 1 hour -Passed Early 1 hr GTT on 6/10/  - FS at 34 weeks  Hypothyroidism  - synthroid 88mcg  - TSH q4-6 weeks   -

## 2024-10-30 ENCOUNTER — ROUTINE PRENATAL (OUTPATIENT)
Dept: OBGYN CLINIC | Age: 34
End: 2024-10-30

## 2024-10-30 VITALS
BODY MASS INDEX: 47.39 KG/M2 | WEIGHT: 293 LBS | DIASTOLIC BLOOD PRESSURE: 86 MMHG | HEART RATE: 75 BPM | SYSTOLIC BLOOD PRESSURE: 122 MMHG

## 2024-10-30 DIAGNOSIS — O09.93 HIGH-RISK PREGNANCY IN THIRD TRIMESTER: Primary | ICD-10-CM

## 2024-10-30 DIAGNOSIS — O99.213 OBESITY AFFECTING PREGNANCY IN THIRD TRIMESTER, UNSPECIFIED OBESITY TYPE: ICD-10-CM

## 2024-10-30 DIAGNOSIS — Z98.891 HISTORY OF CESAREAN SECTION: ICD-10-CM

## 2024-10-30 DIAGNOSIS — E03.9 HYPOTHYROIDISM, UNSPECIFIED TYPE: ICD-10-CM

## 2024-10-30 DIAGNOSIS — O99.613 CONSTIPATION DURING PREGNANCY IN THIRD TRIMESTER: ICD-10-CM

## 2024-10-30 DIAGNOSIS — K59.00 CONSTIPATION DURING PREGNANCY IN THIRD TRIMESTER: ICD-10-CM

## 2024-10-30 NOTE — PROGRESS NOTES
Gestational age 34W3D  Indications HX C/S X1, HX PREE, HYPOTHYROIDISM, BMI  NST started 10:04  /86  Pulse 75  Weight 293.6lb  Patient recording movements. Patient stated fetal movement intermittent pain on right side, constipated-miralax and increasing fluids, bad heartburn-using tums

## 2024-11-01 RX ORDER — SENNA AND DOCUSATE SODIUM 50; 8.6 MG/1; MG/1
1 TABLET, FILM COATED ORAL DAILY PRN
Qty: 60 TABLET | Refills: 1 | Status: SHIPPED | OUTPATIENT
Start: 2024-11-01

## 2024-11-02 NOTE — PROGRESS NOTES
NST (Non Stress Test) Worksheet  24    Diagnosis:  Magda Alegria is a 34 y.o. female    34w6d  1. High-risk pregnancy in third trimester    2. Hypothyroidism, unspecified type    3. History of  section    4. Obesity affecting pregnancy in third trimester, unspecified obesity type    5. Constipation during pregnancy in third trimester          Indication for Testing:  Elevated BMI, Hypothyroidism      Scheduled Procedure:  Non Stress Test (59025-CPT)      Findings:  Total time of tracing to complete testing: >20 Minutes (Minimum must be 20 minutes)  NST is  reactive  CATEGORY 1 TRACING  Variability is moderate  Baseline FHR of 155 bpm    Accelerations are identified 15 beats above the baseline for 15 minutes: Yes (>32 weeks gestation)    Accelerations are identified 10 beats above the baseline for 10 minutes: NA (28-32 weeks gestation)        Insurance: [employer] bag/mug given:  Genetic Information given/reviewed: 5/3/2024 DAVIAN Rivera CNM   1st trimester education packet given: 5/3/2024 DAVIAN Rivera CNM   2nd trimester education packet given:  3rd trimester education packet given:      FOB Name: Phil    Last Pap Smear: 2023, negative  [x] Urine collected for culture   [x] Negative date 5/10/24   [] Positive date   [x] UDS collected > negative   [x] Gc/ct collected > negative  [x] Prenatal Labs completed     Genetic Screening:  [x]Accepted  FTS 5/3/2024 DAVIAN Rivera CNM   [x]Completed  [x] Low risk       Carrier Screening:  [x] Undecided 5/3/2024 DAVIAN Rivera CNM   [] Accepted   [] Declined  [] Known negative CF/SMA/Fragile X  [] Results **    Baby aspirin screen:  [x]Positive (hx preE)  []Negative    Early 1 hour GTT:  [x]Yes (BMI/PCOS) passed 109  [] No    AFP: declined 24  []Ordered  []Completed    Anatomy scan:  [x]Referral Sent  [x]Scheduled 24  [x]Completed-overall quality of ultrasound was

## 2024-11-06 ENCOUNTER — ROUTINE PRENATAL (OUTPATIENT)
Dept: OBGYN CLINIC | Age: 34
End: 2024-11-06
Payer: COMMERCIAL

## 2024-11-06 VITALS — DIASTOLIC BLOOD PRESSURE: 82 MMHG | WEIGHT: 293 LBS | SYSTOLIC BLOOD PRESSURE: 124 MMHG | BODY MASS INDEX: 47.58 KG/M2

## 2024-11-06 DIAGNOSIS — E03.8 OTHER SPECIFIED HYPOTHYROIDISM: ICD-10-CM

## 2024-11-06 DIAGNOSIS — Z3A.35 35 WEEKS GESTATION OF PREGNANCY: Primary | ICD-10-CM

## 2024-11-06 PROCEDURE — 76818 FETAL BIOPHYS PROFILE W/NST: CPT | Performed by: OBSTETRICS & GYNECOLOGY

## 2024-11-06 PROCEDURE — 0502F SUBSEQUENT PRENATAL CARE: CPT | Performed by: OBSTETRICS & GYNECOLOGY

## 2024-11-06 NOTE — PROGRESS NOTES
NST (Non Stress Test) Worksheet  24    Diagnosis:  Magda Alegria is a 34 y.o. female    35w3d  1. 35 weeks gestation of pregnancy          Indication for Testing:  Elevated BMI, Hypothyroid      Scheduled Procedure:  Non Stress Test (59025-CPT)      Findings:  Total time of tracing to complete testing: >20 Minutes (Minimum must be 20 minutes)  NST is  reactive  CATEGORY 1 TRACING  Variability is moderate  Baseline FHR of 145 bpm    Accelerations are identified 15 beats above the baseline for 15 minutes: Yes (>32 weeks gestation)    Accelerations are identified 10 beats above the baseline for 10 minutes: NA (28-32 weeks gestation)        Insurance: [employer] bag/mug given:  Genetic Information given/reviewed: 5/3/2024 DAVIAN Rivera CNM   1st trimester education packet given: 5/3/2024 DAVIAN Rivera CNM   2nd trimester education packet given:  3rd trimester education packet given:      FOB Name: Phil    Last Pap Smear: 2023, negative  [x] Urine collected for culture   [x] Negative date 5/10/24   [] Positive date   [x] UDS collected > negative   [x] Gc/ct collected > negative  [x] Prenatal Labs completed     Genetic Screening:  [x]Accepted  FTS 5/3/2024 DAVIAN Rivera CNM   [x]Completed  [x] Low risk       Carrier Screening:  [x] Undecided 5/3/2024 DAVIAN Rivera CNM   [] Accepted   [] Declined  [] Known negative CF/SMA/Fragile X  [] Results **    Baby aspirin screen:  [x]Positive (hx preE)  []Negative    Early 1 hour GTT:  [x]Yes (BMI/PCOS) passed 109  [] No    AFP: declined 24  []Ordered  []Completed    Anatomy scan:  [x]Referral Sent  [x]Scheduled 24  [x]Completed-overall quality of ultrasound was suboptimal given maternal body habitus and fetal lie however no obvious evidence of congenital birth defects.  Fetal size appropriate for gestational age.  Cervical length was reassuring no evidence of vasa previa or placental

## 2024-11-13 ENCOUNTER — ROUTINE PRENATAL (OUTPATIENT)
Dept: OBGYN CLINIC | Age: 34
End: 2024-11-13
Payer: COMMERCIAL

## 2024-11-13 ENCOUNTER — HOSPITAL ENCOUNTER (OUTPATIENT)
Age: 34
Setting detail: SPECIMEN
Discharge: HOME | End: 2024-11-13

## 2024-11-13 VITALS — SYSTOLIC BLOOD PRESSURE: 122 MMHG | WEIGHT: 293 LBS | BODY MASS INDEX: 47.97 KG/M2 | DIASTOLIC BLOOD PRESSURE: 78 MMHG

## 2024-11-13 DIAGNOSIS — O09.93 HIGH-RISK PREGNANCY IN THIRD TRIMESTER: ICD-10-CM

## 2024-11-13 DIAGNOSIS — Z3A.36 36 WEEKS GESTATION OF PREGNANCY: ICD-10-CM

## 2024-11-13 DIAGNOSIS — E03.9 HYPOTHYROIDISM AFFECTING PREGNANCY IN THIRD TRIMESTER: Primary | ICD-10-CM

## 2024-11-13 DIAGNOSIS — O99.283 HYPOTHYROIDISM AFFECTING PREGNANCY IN THIRD TRIMESTER: Primary | ICD-10-CM

## 2024-11-13 DIAGNOSIS — O99.213 OBESITY AFFECTING PREGNANCY IN THIRD TRIMESTER, UNSPECIFIED OBESITY TYPE: ICD-10-CM

## 2024-11-13 DIAGNOSIS — O09.293 HISTORY OF PRE-ECLAMPSIA IN PRIOR PREGNANCY, CURRENTLY PREGNANT IN THIRD TRIMESTER: ICD-10-CM

## 2024-11-13 DIAGNOSIS — Z98.891 HISTORY OF CESAREAN SECTION: ICD-10-CM

## 2024-11-13 DIAGNOSIS — E03.9 HYPOTHYROIDISM, UNSPECIFIED TYPE: ICD-10-CM

## 2024-11-13 PROCEDURE — 59025 FETAL NON-STRESS TEST: CPT | Performed by: OBSTETRICS & GYNECOLOGY

## 2024-11-13 PROCEDURE — 59025 FETAL NON-STRESS TEST: CPT | Performed by: STUDENT IN AN ORGANIZED HEALTH CARE EDUCATION/TRAINING PROGRAM

## 2024-11-13 PROCEDURE — 0502F SUBSEQUENT PRENATAL CARE: CPT | Performed by: STUDENT IN AN ORGANIZED HEALTH CARE EDUCATION/TRAINING PROGRAM

## 2024-11-13 NOTE — PROGRESS NOTES
Prenatal Visit    Magda Alegria is a 34 y.o. female  at 36w3d    The patient was seen and evaluated. She has no complaints. There was positive fetal movements. She denies contractions, vaginal bleeding and leakage of fluid. Signs and symptoms of labor were reviewed.  The S/S of Pre-Eclampsia were reviewed with the patient in detail. She is to report any of these if they occur. She currently denies any of these.    Discussed resident involvement in triage and labor and delivery.  Discussed call group.  Patient verbalized understanding and agreement.    Allergies:  No Known Allergies    Vitals:  /78 (Site: Left Upper Arm, Position: Sitting, Cuff Size: Large Adult)   Wt 134.8 kg (297 lb 3.2 oz)   LMP 2024 (Exact Date)   BMI 47.97 kg/m²       NST:  Baseline: 145  Variability: moderate  Accelerations: present  Decelerations: absent  Reactive: yes   Time: 20min    BPP 8/8     Media Information      Document Information    Radiology and Imaging: Radiology/Imaging   us report   2024 10:43   Attached To:   US FETAL BIOPHYSICAL PROFILE WO NON STRESS TESTING [[id number]]   Ancillary Procedure on 24 with Alex and [employer] US   Source Information    Lyla Martinez  [facility]   Document History          Assessment & Plan:  Magda Alegria is a 34 y.o. female  at 36w3d   - 28 week labs completed   - Influenza and Covid vaccinations recommended per ACOG: R/B/A discussed with increased risk of both maternal and fetal morbidity and mortality in unvaccinated pregnant patients.    - TDAP and RSV vaccinations recommended per ACOG. R/B/A discussed. She understands must received RSV vaccine at pharmacy.   - Continue prenatal vitamin   - GBS collected today   - Flu and tdap done   - TSH ordered            FOB Name: Phil    Last Pap Smear: 2023, negative  [x] Urine collected for culture   [x] Negative date 5/10/24   [] Positive date   [x] UDS collected > negative   [x] Gc/ct

## 2024-11-16 LAB
MICROORGANISM SPEC CULT: NORMAL
SERVICE CMNT-IMP: NORMAL
SPECIMEN DESCRIPTION: NORMAL

## 2024-11-19 ENCOUNTER — ROUTINE PRENATAL (OUTPATIENT)
Dept: OBGYN CLINIC | Age: 34
End: 2024-11-19
Payer: COMMERCIAL

## 2024-11-19 VITALS — SYSTOLIC BLOOD PRESSURE: 122 MMHG | WEIGHT: 293 LBS | DIASTOLIC BLOOD PRESSURE: 88 MMHG | BODY MASS INDEX: 48.23 KG/M2

## 2024-11-19 DIAGNOSIS — E03.9 HYPOTHYROIDISM AFFECTING PREGNANCY IN THIRD TRIMESTER: ICD-10-CM

## 2024-11-19 DIAGNOSIS — O09.293 HISTORY OF PRE-ECLAMPSIA IN PRIOR PREGNANCY, CURRENTLY PREGNANT IN THIRD TRIMESTER: ICD-10-CM

## 2024-11-19 DIAGNOSIS — O09.93 HIGH-RISK PREGNANCY IN THIRD TRIMESTER: Primary | ICD-10-CM

## 2024-11-19 DIAGNOSIS — O99.283 HYPOTHYROIDISM AFFECTING PREGNANCY IN THIRD TRIMESTER: ICD-10-CM

## 2024-11-19 DIAGNOSIS — Z3A.37 37 WEEKS GESTATION OF PREGNANCY: ICD-10-CM

## 2024-11-19 DIAGNOSIS — O99.213 OBESITY AFFECTING PREGNANCY IN THIRD TRIMESTER, UNSPECIFIED OBESITY TYPE: ICD-10-CM

## 2024-11-19 DIAGNOSIS — Z98.891 HISTORY OF CESAREAN SECTION: ICD-10-CM

## 2024-11-19 PROCEDURE — 59025 FETAL NON-STRESS TEST: CPT | Performed by: NURSE PRACTITIONER

## 2024-11-19 PROCEDURE — 0502F SUBSEQUENT PRENATAL CARE: CPT | Performed by: NURSE PRACTITIONER

## 2024-11-19 NOTE — PROGRESS NOTES
S:  Here for NST for fetal surveillance secondary to high risk pregnancy- obesity    Insurance: [employer]    IPV bag/mug given:  Genetic Information given/reviewed: 5/3/2024 DAVIAN Rivera CNM   1st trimester education packet given: 5/3/2024 DAVIAN Rivera CNM   2nd trimester education packet given:  3rd trimester education packet given:      FOB Name: Phil    Last Pap Smear: 2023, negative  [x] Urine collected for culture   [x] Negative date 5/10/24   [] Positive date   [x] UDS collected > negative   [x] Gc/ct collected > negative  [x] Prenatal Labs completed     Genetic Screening:  [x]Accepted  FTS 5/3/2024 DAVIAN Rivera CNM   [x]Completed  [x] Low risk       Carrier Screening:  [x] Undecided 5/3/2024 DAVIAN Rivera CNM   [] Accepted   [] Declined  [] Known negative CF/SMA/Fragile X  [] Results **    Baby aspirin screen:  [x]Positive (hx preE)  []Negative    Early 1 hour GTT:  [x]Yes (BMI/PCOS) passed 109  [] No    AFP: declined 24  []Ordered  []Completed    Anatomy scan:  [x]Referral Sent  [x]Scheduled 24  [x]Completed-overall quality of ultrasound was suboptimal given maternal body habitus and fetal lie however no obvious evidence of congenital birth defects.  Fetal size appropriate for gestational age.  Cervical length was reassuring no evidence of vasa previa or placental previa/low-lying placenta.  [x] Follow up-[facility] recommended follow-up ultrasound at 26 weeks growth BPP given fetal/maternal medical/obstetrical complications of pregnancy    Fetal Echo:   [] Yes  [x] No    High Risk Factors:  Hx of C/S x1  - failure to progress  - leaning towards repeat c/s   - Planning repeat CS > Dec 2-  - Does not desires b/l RRS  Hx of preE  - cmp/cbc/p/c ratio at IPV  - baby asa at 13 weeks  - P:C 0.08  Pregravid BMI 43  - early 1 hour -Passed Early 1 hr GTT on 6/10/  - FS at 34 weeks pt agreeable counseled 10/14/24  Hypothyroidism  -

## 2024-11-27 ENCOUNTER — ROUTINE PRENATAL (OUTPATIENT)
Dept: OBGYN CLINIC | Age: 34
End: 2024-11-27

## 2024-11-27 VITALS — BODY MASS INDEX: 48.42 KG/M2 | DIASTOLIC BLOOD PRESSURE: 74 MMHG | WEIGHT: 293 LBS | SYSTOLIC BLOOD PRESSURE: 128 MMHG

## 2024-11-27 DIAGNOSIS — O99.283 HYPOTHYROIDISM AFFECTING PREGNANCY IN THIRD TRIMESTER: ICD-10-CM

## 2024-11-27 DIAGNOSIS — O09.93 HIGH-RISK PREGNANCY IN THIRD TRIMESTER: ICD-10-CM

## 2024-11-27 DIAGNOSIS — O99.213 OBESITY AFFECTING PREGNANCY IN THIRD TRIMESTER, UNSPECIFIED OBESITY TYPE: ICD-10-CM

## 2024-11-27 DIAGNOSIS — E03.9 HYPOTHYROIDISM AFFECTING PREGNANCY IN THIRD TRIMESTER: ICD-10-CM

## 2024-11-27 DIAGNOSIS — Z3A.38 38 WEEKS GESTATION OF PREGNANCY: ICD-10-CM

## 2024-11-27 DIAGNOSIS — O09.293 HISTORY OF PRE-ECLAMPSIA IN PRIOR PREGNANCY, CURRENTLY PREGNANT IN THIRD TRIMESTER: Primary | ICD-10-CM

## 2024-11-27 DIAGNOSIS — Z98.891 HISTORY OF CESAREAN SECTION: ICD-10-CM

## 2024-12-03 NOTE — H&P
OBSTETRICAL HISTORY AND PHYSICAL  [facility]    Date: 2024       Time: 7:09 AM   Patient Name: Magda Alegria     Patient : 1990  Room[facility]: [facility]    Admission Date/Time: 2024  5:50 AM      CC: Scheduled Repeat  Section 2/2 history x1 declining TOLAC     HPI: Magda Alegria is a 34 y.o.  at 39w4d who presents for Scheduled Repeat  Section 2/2 history x1 declining TOLAC  . Patient denies any fever, chills, N/V, headaches, vision changes, chest pain, shortness of breath, RUQ pain, abdominal pain, and increased swelling/tenderness in bilateral lower extremities. Patient denies any vaginal discharge and any urinary complaints. The patient reports fetal movement is present, denies contractions, denies loss of fluid, denies vaginal bleeding.    DATING:  LMP: Patient's last menstrual period was 2024 (exact date).  Estimated Date of Delivery: 24   Based on: LMP    PREGNANCY RISK FACTORS:  Patient Active Problem List   Diagnosis    PCOS    Anxiety    GERD    Hypothyroid    COVID-19 vaccine administered    Preeclampsia w/o SF's (G1)    PLTCS 22 Apg 8/9 Wt 7#11    Migraine without aura and without status migrainosus, not intractable    Vitamin D deficiency    Endometriosis    Dysmenorrhea    History of  section    39 weeks gestation of pregnancy        Steroids Given In This Pregnancy:  no     REVIEW OF SYSTEMS:   Constitutional: negative fever, negative chills, negative weight changes   HEENT: negative visual disturbances, negative headaches, negative dizziness, negative hearing loss  Breast: Negative breast abnormalities, negative breast lumps, negative nipple discharge  Respiratory: negative dyspnea, negative cough, negative SOB  Cardiovascular: negative chest pain,  negative palpitations, negative arrhythmia, negative syncope   Gastrointestinal: negative abdominal pain, negative RUQ pain, negative N/V, negative diarrhea,

## 2024-12-03 NOTE — DISCHARGE SUMMARY
Obstetric Discharge Summary  [facility]    Patient Name: Magda Alegria  Patient : 1990  Primary Care Physician: Anupama Thurman DO  Admit Date: 2024    Principal Diagnosis: IUP at 39w4d, admitted for Scheduled repeat  section secondary to history x1 declines TOLAC     Her pregnancy has been complicated by:   Patient Active Problem List   Diagnosis    PCOS    Anxiety    GERD    Hypothyroid    COVID-19 vaccine administered    Preeclampsia w/o SF's (G1)    Migraine without aura and without status migrainosus, not intractable    Vitamin D deficiency    Endometriosis    Dysmenorrhea    Hx CS x 2    RLTCS 24 M Apg 8/9 Wt 8#2       Infection Present?: No  Hospital Acquired: No    Surgical Operations & Procedures:  Analgesia: spinal  Delivery Type:  Delivery: RLTCS   Laceration(s): Absent    Consultations: NICU, and Anesthesia    Pertinent Findings & Procedures:   Magda Alegria is a 34 y.o. female  at 39w4d admitted for Scheduled repeat  section secondary to history x1 declines TOLAC; received Ancef, Bicitra, Pepcid, Tylenol, Scop patch, TXA.     She delivered by repeat low transverse  a Live Born infant on 24.       Information for the patient's :  Darwin Alegria [[id number]]   male   Birth Weight: 3.69 kg (8 lb 2.2 oz)    Apgars: 8 at 1 minute and 9 at 5 minutes.     ERAS for  Section  Received ERAS education outpatient: Yes  Consumed electrolyte-rich clear fluid prior to surgery: Yes  Received pre-operative Tylenol and Pepcid: Yes  Received Scopolamine patch: Yes  Received post-operative scheduled Motrin and Tylenol: Yes  Amin catheter discontinued 12 hours post-operatively: Yes        ERAS requirements met (3/6): Yes    Postpartum course:  She received Ancef/Keflex/Flagyl for wound prophylaxis and Lovenox for DVT prophylaxis  POD#1: Hgb 10.4    Course of patient: uncomplicated    Discharge to: Home    Readmission

## 2024-12-05 ENCOUNTER — ANESTHESIA (OUTPATIENT)
Dept: LABOR AND DELIVERY | Age: 34
End: 2024-12-05
Payer: COMMERCIAL

## 2024-12-05 ENCOUNTER — HOSPITAL ENCOUNTER (INPATIENT)
Age: 34
LOS: 4 days | Discharge: HOME | End: 2024-12-09
Attending: OBSTETRICS & GYNECOLOGY | Admitting: OBSTETRICS & GYNECOLOGY
Payer: COMMERCIAL

## 2024-12-05 ENCOUNTER — ANESTHESIA EVENT (OUTPATIENT)
Dept: LABOR AND DELIVERY | Age: 34
End: 2024-12-05
Payer: COMMERCIAL

## 2024-12-05 DIAGNOSIS — G89.18 POST-OP PAIN: Primary | ICD-10-CM

## 2024-12-05 PROBLEM — Z98.891 HISTORY OF CESAREAN SECTION: Status: ACTIVE | Noted: 2024-12-05

## 2024-12-05 PROBLEM — Z3A.39 39 WEEKS GESTATION OF PREGNANCY: Status: ACTIVE | Noted: 2024-12-05

## 2024-12-05 LAB
ABO + RH BLD: NORMAL
AMPHET UR QL SCN: NEGATIVE
ARM BAND NUMBER: NORMAL
BARBITURATES UR QL SCN: NEGATIVE
BENZODIAZ UR QL: NEGATIVE
BLOOD BANK SAMPLE EXPIRATION: NORMAL
BLOOD GROUP ANTIBODIES SERPL: NEGATIVE
CANNABINOIDS UR QL SCN: NEGATIVE
COCAINE UR QL SCN: NEGATIVE
ERYTHROCYTE [DISTWIDTH] IN BLOOD BY AUTOMATED COUNT: 13.6 % (ref 11.8–14.4)
FENTANYL UR QL: NEGATIVE
HCT VFR BLD AUTO: 38.4 % (ref 36.3–47.1)
HGB BLD-MCNC: 12.5 G/DL (ref 11.9–15.1)
MCH RBC QN AUTO: 27.7 PG (ref 25.2–33.5)
MCHC RBC AUTO-ENTMCNC: 32.6 G/DL (ref 28.4–34.8)
MCV RBC AUTO: 85.1 FL (ref 82.6–102.9)
METHADONE UR QL: NEGATIVE
NRBC BLD-RTO: 0 PER 100 WBC
OPIATES UR QL SCN: NEGATIVE
OXYCODONE UR QL SCN: NEGATIVE
PCP UR QL SCN: NEGATIVE
PLATELET # BLD AUTO: 350 K/UL (ref 138–453)
PMV BLD AUTO: 9.8 FL (ref 8.1–13.5)
RBC # BLD AUTO: 4.51 M/UL (ref 3.95–5.11)
T PALLIDUM AB SER QL IA: NONREACTIVE
TEST INFORMATION: NORMAL
WBC OTHER # BLD: 9.3 K/UL (ref 3.5–11.3)

## 2024-12-05 PROCEDURE — 3609079900 HC CESAREAN SECTION: Performed by: OBSTETRICS & GYNECOLOGY

## 2024-12-05 PROCEDURE — 86900 BLOOD TYPING SEROLOGIC ABO: CPT

## 2024-12-05 PROCEDURE — 7100000000 HC PACU RECOVERY - FIRST 15 MIN: Performed by: OBSTETRICS & GYNECOLOGY

## 2024-12-05 PROCEDURE — 6360000002 HC RX W HCPCS

## 2024-12-05 PROCEDURE — 80307 DRUG TEST PRSMV CHEM ANLYZR: CPT

## 2024-12-05 PROCEDURE — 2580000003 HC RX 258

## 2024-12-05 PROCEDURE — 3700000001 HC ADD 15 MINUTES (ANESTHESIA): Performed by: OBSTETRICS & GYNECOLOGY

## 2024-12-05 PROCEDURE — 6370000000 HC RX 637 (ALT 250 FOR IP)

## 2024-12-05 PROCEDURE — 86780 TREPONEMA PALLIDUM: CPT

## 2024-12-05 PROCEDURE — 59510 CESAREAN DELIVERY: CPT | Performed by: OBSTETRICS & GYNECOLOGY

## 2024-12-05 PROCEDURE — 2709999900 HC NON-CHARGEABLE SUPPLY: Performed by: OBSTETRICS & GYNECOLOGY

## 2024-12-05 PROCEDURE — 3700000000 HC ANESTHESIA ATTENDED CARE: Performed by: OBSTETRICS & GYNECOLOGY

## 2024-12-05 PROCEDURE — 2720000010 HC SURG SUPPLY STERILE: Performed by: OBSTETRICS & GYNECOLOGY

## 2024-12-05 PROCEDURE — 2500000003 HC RX 250 WO HCPCS

## 2024-12-05 PROCEDURE — 7100000001 HC PACU RECOVERY - ADDTL 15 MIN: Performed by: OBSTETRICS & GYNECOLOGY

## 2024-12-05 PROCEDURE — 6360000002 HC RX W HCPCS: Performed by: ANESTHESIOLOGY

## 2024-12-05 PROCEDURE — 86850 RBC ANTIBODY SCREEN: CPT

## 2024-12-05 PROCEDURE — 86901 BLOOD TYPING SEROLOGIC RH(D): CPT

## 2024-12-05 PROCEDURE — 85027 COMPLETE CBC AUTOMATED: CPT

## 2024-12-05 PROCEDURE — 1220000000 HC SEMI PRIVATE OB R&B

## 2024-12-05 RX ORDER — KETOROLAC TROMETHAMINE 30 MG/ML
30 INJECTION, SOLUTION INTRAMUSCULAR; INTRAVENOUS EVERY 6 HOURS
Status: DISCONTINUED | OUTPATIENT
Start: 2024-12-05 | End: 2024-12-06

## 2024-12-05 RX ORDER — SIMETHICONE 80 MG
80 TABLET,CHEWABLE ORAL 4 TIMES DAILY PRN
Qty: 60 TABLET | Refills: 1 | Status: SHIPPED | OUTPATIENT
Start: 2024-12-05

## 2024-12-05 RX ORDER — MORPHINE SULFATE 1 MG/ML
INJECTION, SOLUTION EPIDURAL; INTRATHECAL; INTRAVENOUS
Status: DISCONTINUED | OUTPATIENT
Start: 2024-12-05 | End: 2024-12-05

## 2024-12-05 RX ORDER — ONDANSETRON 2 MG/ML
4 INJECTION INTRAMUSCULAR; INTRAVENOUS EVERY 6 HOURS PRN
Status: DISCONTINUED | OUTPATIENT
Start: 2024-12-05 | End: 2024-12-09 | Stop reason: HOSPADM

## 2024-12-05 RX ORDER — SODIUM CHLORIDE 0.9 % (FLUSH) 0.9 %
10 SYRINGE (ML) INJECTION EVERY 12 HOURS SCHEDULED
Status: DISCONTINUED | OUTPATIENT
Start: 2024-12-05 | End: 2024-12-05

## 2024-12-05 RX ORDER — SODIUM CHLORIDE, SODIUM LACTATE, POTASSIUM CHLORIDE, CALCIUM CHLORIDE 600; 310; 30; 20 MG/100ML; MG/100ML; MG/100ML; MG/100ML
INJECTION, SOLUTION INTRAVENOUS CONTINUOUS
Status: DISCONTINUED | OUTPATIENT
Start: 2024-12-05 | End: 2024-12-06

## 2024-12-05 RX ORDER — SODIUM CHLORIDE, SODIUM LACTATE, POTASSIUM CHLORIDE, CALCIUM CHLORIDE 600; 310; 30; 20 MG/100ML; MG/100ML; MG/100ML; MG/100ML
INJECTION, SOLUTION INTRAVENOUS CONTINUOUS
Status: DISCONTINUED | OUTPATIENT
Start: 2024-12-05 | End: 2024-12-05

## 2024-12-05 RX ORDER — VITAMIN A, ASCORBIC ACID, CHOLECALCIFEROL, .ALPHA.-TOCOPHEROL ACETATE, DL-, THIAMINE MONONITRATE, RIBOFLAVIN, NIACINAMIDE, PYRIDOXINE HYDROCHLORIDE, FOLIC ACID, CYANOCOBALAMIN, CALCIUM CARBONATE, IRON, ZINC OXIDE, AND CUPRIC OXIDE 4000; 120; 400; 22; 1.84; 3; 20; 10; 1; 12; 200; 29; 25; 2 [IU]/1; MG/1; [IU]/1; [IU]/1; MG/1; MG/1; MG/1; MG/1; MG/1; UG/1; MG/1; MG/1; MG/1; MG/1
1 TABLET ORAL DAILY
Status: DISCONTINUED | OUTPATIENT
Start: 2024-12-05 | End: 2024-12-09 | Stop reason: HOSPADM

## 2024-12-05 RX ORDER — SODIUM CHLORIDE, SODIUM LACTATE, POTASSIUM CHLORIDE, AND CALCIUM CHLORIDE .6; .31; .03; .02 G/100ML; G/100ML; G/100ML; G/100ML
1000 INJECTION, SOLUTION INTRAVENOUS ONCE
Status: DISCONTINUED | OUTPATIENT
Start: 2024-12-05 | End: 2024-12-05

## 2024-12-05 RX ORDER — ACETAMINOPHEN 500 MG
1000 TABLET ORAL EVERY 6 HOURS
Status: DISCONTINUED | OUTPATIENT
Start: 2024-12-05 | End: 2024-12-09 | Stop reason: HOSPADM

## 2024-12-05 RX ORDER — ONDANSETRON 4 MG/1
4 TABLET, ORALLY DISINTEGRATING ORAL EVERY 8 HOURS PRN
Status: DISCONTINUED | OUTPATIENT
Start: 2024-12-05 | End: 2024-12-09 | Stop reason: HOSPADM

## 2024-12-05 RX ORDER — ONDANSETRON 4 MG/1
4 TABLET, ORALLY DISINTEGRATING ORAL 3 TIMES DAILY PRN
Qty: 21 TABLET | Refills: 0 | Status: SHIPPED | OUTPATIENT
Start: 2024-12-05

## 2024-12-05 RX ORDER — MORPHINE SULFATE 1 MG/ML
INJECTION, SOLUTION EPIDURAL; INTRATHECAL; INTRAVENOUS
Status: DISCONTINUED | OUTPATIENT
Start: 2024-12-05 | End: 2024-12-05 | Stop reason: SDUPTHER

## 2024-12-05 RX ORDER — SODIUM CHLORIDE 0.9 % (FLUSH) 0.9 %
10 SYRINGE (ML) INJECTION PRN
Status: DISCONTINUED | OUTPATIENT
Start: 2024-12-05 | End: 2024-12-05

## 2024-12-05 RX ORDER — OXYCODONE HYDROCHLORIDE 5 MG/1
5 TABLET ORAL EVERY 6 HOURS PRN
Qty: 20 TABLET | Refills: 0 | Status: SHIPPED | OUTPATIENT
Start: 2024-12-05 | End: 2024-12-10

## 2024-12-05 RX ORDER — METRONIDAZOLE 500 MG/1
500 TABLET ORAL EVERY 8 HOURS SCHEDULED
Status: COMPLETED | OUTPATIENT
Start: 2024-12-05 | End: 2024-12-07

## 2024-12-05 RX ORDER — ACETAMINOPHEN 500 MG
1000 TABLET ORAL ONCE
Status: COMPLETED | OUTPATIENT
Start: 2024-12-05 | End: 2024-12-05

## 2024-12-05 RX ORDER — NALOXONE HYDROCHLORIDE 0.4 MG/ML
INJECTION, SOLUTION INTRAMUSCULAR; INTRAVENOUS; SUBCUTANEOUS PRN
Status: DISCONTINUED | OUTPATIENT
Start: 2024-12-05 | End: 2024-12-05 | Stop reason: HOSPADM

## 2024-12-05 RX ORDER — TRANEXAMIC ACID 10 MG/ML
1000 INJECTION, SOLUTION INTRAVENOUS ONCE
Status: COMPLETED | OUTPATIENT
Start: 2024-12-05 | End: 2024-12-05

## 2024-12-05 RX ORDER — ACETAMINOPHEN 500 MG
1000 TABLET ORAL EVERY 6 HOURS PRN
Qty: 30 TABLET | Refills: 1 | Status: SHIPPED | OUTPATIENT
Start: 2024-12-05

## 2024-12-05 RX ORDER — ONDANSETRON 2 MG/ML
INJECTION INTRAMUSCULAR; INTRAVENOUS
Status: DISCONTINUED | OUTPATIENT
Start: 2024-12-05 | End: 2024-12-05 | Stop reason: SDUPTHER

## 2024-12-05 RX ORDER — SENNA AND DOCUSATE SODIUM 50; 8.6 MG/1; MG/1
2 TABLET, FILM COATED ORAL 2 TIMES DAILY
Status: DISCONTINUED | OUTPATIENT
Start: 2024-12-06 | End: 2024-12-09 | Stop reason: HOSPADM

## 2024-12-05 RX ORDER — DOCUSATE SODIUM 100 MG/1
100 CAPSULE, LIQUID FILLED ORAL 2 TIMES DAILY
Status: DISCONTINUED | OUTPATIENT
Start: 2024-12-05 | End: 2024-12-06

## 2024-12-05 RX ORDER — IBUPROFEN 600 MG/1
600 TABLET, FILM COATED ORAL EVERY 6 HOURS PRN
Qty: 40 TABLET | Refills: 0 | Status: SHIPPED | OUTPATIENT
Start: 2024-12-05

## 2024-12-05 RX ORDER — CEPHALEXIN 500 MG/1
500 CAPSULE ORAL EVERY 8 HOURS SCHEDULED
Status: COMPLETED | OUTPATIENT
Start: 2024-12-06 | End: 2024-12-08

## 2024-12-05 RX ORDER — SODIUM CHLORIDE 9 MG/ML
INJECTION, SOLUTION INTRAVENOUS PRN
Status: DISCONTINUED | OUTPATIENT
Start: 2024-12-05 | End: 2024-12-05

## 2024-12-05 RX ORDER — KETOROLAC TROMETHAMINE 30 MG/ML
INJECTION, SOLUTION INTRAMUSCULAR; INTRAVENOUS
Status: DISCONTINUED | OUTPATIENT
Start: 2024-12-05 | End: 2024-12-05 | Stop reason: SDUPTHER

## 2024-12-05 RX ORDER — ONDANSETRON 2 MG/ML
4 INJECTION INTRAMUSCULAR; INTRAVENOUS EVERY 6 HOURS PRN
Status: DISCONTINUED | OUTPATIENT
Start: 2024-12-05 | End: 2024-12-05 | Stop reason: SDUPTHER

## 2024-12-05 RX ORDER — OXYCODONE HYDROCHLORIDE 5 MG/1
10 TABLET ORAL EVERY 4 HOURS PRN
Status: DISCONTINUED | OUTPATIENT
Start: 2024-12-05 | End: 2024-12-09 | Stop reason: HOSPADM

## 2024-12-05 RX ORDER — POLYETHYLENE GLYCOL 3350 17 G/17G
17 POWDER, FOR SOLUTION ORAL DAILY
Status: DISCONTINUED | OUTPATIENT
Start: 2024-12-05 | End: 2024-12-09 | Stop reason: HOSPADM

## 2024-12-05 RX ORDER — LANOLIN
CREAM (ML) TOPICAL
Status: DISCONTINUED | OUTPATIENT
Start: 2024-12-05 | End: 2024-12-09 | Stop reason: HOSPADM

## 2024-12-05 RX ORDER — BUPIVACAINE HYDROCHLORIDE 7.5 MG/ML
INJECTION, SOLUTION INTRASPINAL
Status: COMPLETED | OUTPATIENT
Start: 2024-12-05 | End: 2024-12-05

## 2024-12-05 RX ORDER — OXYCODONE HYDROCHLORIDE 5 MG/1
5 TABLET ORAL EVERY 4 HOURS PRN
Status: DISCONTINUED | OUTPATIENT
Start: 2024-12-05 | End: 2024-12-09 | Stop reason: HOSPADM

## 2024-12-05 RX ORDER — SCOLOPAMINE TRANSDERMAL SYSTEM 1 MG/1
1 PATCH, EXTENDED RELEASE TRANSDERMAL ONCE
Status: COMPLETED | OUTPATIENT
Start: 2024-12-05 | End: 2024-12-08

## 2024-12-05 RX ORDER — BISACODYL 10 MG
10 SUPPOSITORY, RECTAL RECTAL DAILY PRN
Status: DISCONTINUED | OUTPATIENT
Start: 2024-12-05 | End: 2024-12-09 | Stop reason: HOSPADM

## 2024-12-05 RX ORDER — ONDANSETRON 2 MG/ML
4 INJECTION INTRAMUSCULAR; INTRAVENOUS EVERY 6 HOURS PRN
Status: DISCONTINUED | OUTPATIENT
Start: 2024-12-05 | End: 2024-12-05

## 2024-12-05 RX ORDER — LEVOTHYROXINE SODIUM 88 UG/1
88 TABLET ORAL DAILY
Status: DISCONTINUED | OUTPATIENT
Start: 2024-12-05 | End: 2024-12-05

## 2024-12-05 RX ORDER — SODIUM CHLORIDE 0.9 % (FLUSH) 0.9 %
5-40 SYRINGE (ML) INJECTION EVERY 12 HOURS SCHEDULED
Status: DISCONTINUED | OUTPATIENT
Start: 2024-12-05 | End: 2024-12-09 | Stop reason: HOSPADM

## 2024-12-05 RX ORDER — SODIUM CHLORIDE 9 MG/ML
INJECTION, SOLUTION INTRAVENOUS PRN
Status: DISCONTINUED | OUTPATIENT
Start: 2024-12-05 | End: 2024-12-09 | Stop reason: HOSPADM

## 2024-12-05 RX ORDER — SIMETHICONE 80 MG
80 TABLET,CHEWABLE ORAL EVERY 6 HOURS PRN
Status: DISCONTINUED | OUTPATIENT
Start: 2024-12-05 | End: 2024-12-09 | Stop reason: HOSPADM

## 2024-12-05 RX ORDER — CITRIC ACID/SODIUM CITRATE 334-500MG
30 SOLUTION, ORAL ORAL ONCE
Status: COMPLETED | OUTPATIENT
Start: 2024-12-05 | End: 2024-12-05

## 2024-12-05 RX ORDER — IBUPROFEN 800 MG/1
800 TABLET, FILM COATED ORAL EVERY 8 HOURS
Status: DISCONTINUED | OUTPATIENT
Start: 2024-12-06 | End: 2024-12-06

## 2024-12-05 RX ORDER — DEXAMETHASONE SODIUM PHOSPHATE 10 MG/ML
INJECTION, SOLUTION INTRAMUSCULAR; INTRAVENOUS
Status: DISCONTINUED | OUTPATIENT
Start: 2024-12-05 | End: 2024-12-05 | Stop reason: SDUPTHER

## 2024-12-05 RX ORDER — SENNA AND DOCUSATE SODIUM 50; 8.6 MG/1; MG/1
1 TABLET, FILM COATED ORAL DAILY
Qty: 30 TABLET | Refills: 1 | Status: SHIPPED | OUTPATIENT
Start: 2024-12-05

## 2024-12-05 RX ORDER — SODIUM CHLORIDE 0.9 % (FLUSH) 0.9 %
5-40 SYRINGE (ML) INJECTION PRN
Status: DISCONTINUED | OUTPATIENT
Start: 2024-12-05 | End: 2024-12-09 | Stop reason: HOSPADM

## 2024-12-05 RX ORDER — ENOXAPARIN SODIUM 100 MG/ML
0.5 INJECTION SUBCUTANEOUS DAILY
Status: DISCONTINUED | OUTPATIENT
Start: 2024-12-06 | End: 2024-12-09 | Stop reason: HOSPADM

## 2024-12-05 RX ORDER — LEVOTHYROXINE SODIUM 88 UG/1
88 TABLET ORAL DAILY
Status: DISCONTINUED | OUTPATIENT
Start: 2024-12-05 | End: 2024-12-09 | Stop reason: HOSPADM

## 2024-12-05 RX ADMIN — TRANEXAMIC ACID 1000 MG: 10 INJECTION, SOLUTION INTRAVENOUS at 12:26

## 2024-12-05 RX ADMIN — PHENYLEPHRINE HYDROCHLORIDE 50 MCG/MIN: 10 INJECTION INTRAVENOUS at 12:23

## 2024-12-05 RX ADMIN — DOCUSATE SODIUM 100 MG: 100 CAPSULE, LIQUID FILLED ORAL at 22:02

## 2024-12-05 RX ADMIN — SODIUM CHLORIDE, PRESERVATIVE FREE 20 MG: 5 INJECTION INTRAVENOUS at 11:36

## 2024-12-05 RX ADMIN — SODIUM CITRATE AND CITRIC ACID MONOHYDRATE 30 ML: 500; 334 SOLUTION ORAL at 11:35

## 2024-12-05 RX ADMIN — Medication 87.3 MILLI-UNITS/MIN: at 13:38

## 2024-12-05 RX ADMIN — SODIUM CHLORIDE, POTASSIUM CHLORIDE, SODIUM LACTATE AND CALCIUM CHLORIDE: 600; 310; 30; 20 INJECTION, SOLUTION INTRAVENOUS at 13:42

## 2024-12-05 RX ADMIN — ACETAMINOPHEN 1000 MG: 500 TABLET ORAL at 11:32

## 2024-12-05 RX ADMIN — BUPIVACAINE HYDROCHLORIDE IN DEXTROSE 13.5 MG: 7.5 INJECTION, SOLUTION SUBARACHNOID at 12:21

## 2024-12-05 RX ADMIN — ACETAMINOPHEN 1000 MG: 500 TABLET ORAL at 18:40

## 2024-12-05 RX ADMIN — DEXAMETHASONE SODIUM PHOSPHATE 4 MG: 10 INJECTION INTRAMUSCULAR; INTRAVENOUS at 12:53

## 2024-12-05 RX ADMIN — ONDANSETRON 4 MG: 2 INJECTION INTRAMUSCULAR; INTRAVENOUS at 12:53

## 2024-12-05 RX ADMIN — SODIUM CHLORIDE, POTASSIUM CHLORIDE, SODIUM LACTATE AND CALCIUM CHLORIDE: 600; 310; 30; 20 INJECTION, SOLUTION INTRAVENOUS at 08:33

## 2024-12-05 RX ADMIN — KETOROLAC TROMETHAMINE 30 MG: 30 INJECTION, SOLUTION INTRAMUSCULAR; INTRAVENOUS at 13:20

## 2024-12-05 RX ADMIN — Medication 909 ML/HR: at 12:53

## 2024-12-05 RX ADMIN — METRONIDAZOLE 500 MG: 500 TABLET ORAL at 22:02

## 2024-12-05 RX ADMIN — SODIUM CHLORIDE, PRESERVATIVE FREE 10 ML: 5 INJECTION INTRAVENOUS at 22:03

## 2024-12-05 RX ADMIN — LEVOTHYROXINE SODIUM 88 MCG: 0.09 TABLET ORAL at 08:32

## 2024-12-05 RX ADMIN — Medication 2000 MG: at 22:03

## 2024-12-05 RX ADMIN — Medication 3000 MG: at 12:12

## 2024-12-05 RX ADMIN — MORPHINE SULFATE 0.2 MG: 1 INJECTION, SOLUTION EPIDURAL; INTRATHECAL; INTRAVENOUS at 12:21

## 2024-12-05 RX ADMIN — KETOROLAC TROMETHAMINE 30 MG: 30 INJECTION, SOLUTION INTRAMUSCULAR; INTRAVENOUS at 22:02

## 2024-12-05 RX ADMIN — SODIUM CHLORIDE, POTASSIUM CHLORIDE, SODIUM LACTATE AND CALCIUM CHLORIDE: 600; 310; 30; 20 INJECTION, SOLUTION INTRAVENOUS at 13:04

## 2024-12-05 ASSESSMENT — PAIN SCALES - GENERAL
PAINLEVEL_OUTOF10: 1
PAINLEVEL_OUTOF10: 3

## 2024-12-05 ASSESSMENT — PAIN DESCRIPTION - LOCATION: LOCATION: INCISION;ABDOMEN

## 2024-12-05 ASSESSMENT — PAIN DESCRIPTION - DESCRIPTORS: DESCRIPTORS: DISCOMFORT

## 2024-12-05 NOTE — ANESTHESIA PRE PROCEDURE
Department of Anesthesiology  Preprocedure Note       Name:  Magda Alegria   Age:  34 y.o.  :  1990                                          MRN:  [id number]         Date:  2024      Surgeon: Surgeon(s):  Jerrell Mccray DO    Procedure: Procedure(s):   SECTION    Medications prior to admission:   Prior to Admission medications    Medication Sig Start Date End Date Taking? Authorizing Provider   sennosides-docusate sodium (SENOKOT-S) 8.6-50 MG tablet Take 1 tablet by mouth daily as needed for Constipation 24   Jerrell Mccray DO   magnesium oxide (MAG-OX) 400 MG tablet Take 1 tablet by mouth daily 10/4/24   Kasandra Jacobo DO   Ferrous Sulfate (IRON) 28 MG TABS Take 325 mg by mouth every other day 24  Kasandra Jacobo DO   Prenatal Vit-Fe Fumarate-FA (PRENATAL VITAMINS) 28-0.8 MG TABS Take 1 tablet by mouth daily 24   Jerrell Mccray DO   folic acid (FOLVITE) 1 MG tablet Take 1 tablet by mouth daily 24   Jerrell Mccray DO   levothyroxine (SYNTHROID) 88 MCG tablet TAKE 1 TABLET BY MOUTH IN THE MORNING IMMEDIATELY UPON ARISING, DELAY EATING/DRINKING FOR 30 MINUTES 23   Anupama Thurman DO   aspirin 81 MG chewable tablet Take 81 mg by mouth daily 22  Provider, MD Sadia       Current medications:    Current Facility-Administered Medications   Medication Dose Route Frequency Provider Last Rate Last Admin    levothyroxine (SYNTHROID) tablet 88 mcg  88 mcg Oral Daily Yessenia Kelly DO   88 mcg at 24 0832    lactated ringers infusion   IntraVENous Continuous Yessenia Kelly  mL/hr at 24 0833 New Bag at 24 0833    lactated ringers bolus 1,000 mL  1,000 mL IntraVENous Once Yessenia Kelly DO        sodium chloride flush 0.9 % injection 10 mL  10 mL IntraVENous 2 times per day Yessenia Kelly DO        sodium chloride flush 0.9 % injection 10 mL  10 mL IntraVENous PRN Yessenia Kelly DO        0.9 %

## 2024-12-05 NOTE — CARE COORDINATION
ANTEPARTUM NOTE    History of  [Z98.891]  39 weeks gestation of pregnancy [Z3A.39]    Magda was admitted to L&D on 24 for scheduled repeat C/S declining TOLAC @ 39w4d    OB GYN Provider: Dr. Jacobo    Will meet with patient after delivery to verify name/address/phone/insurance and discuss discharge planning.     Anticipate DC home 2 nights after vaginal delivery or 4 nights after C/S delivery as long as hemodynamically stable.

## 2024-12-05 NOTE — CONSULTS
Initiation of Electric Breast Pumping     Pumping Initiated at 1700    Initiated due to    [x]   Baby in NICU   [x]   Plans exclusive pumping   []   Infant weight loss(supplement)   []   Baby not latching well    Flange Size    Right:   Left:     [x]   24    [x]   24     []   27    []   27     []   30    []   30     []   36    []   36  Instructions   []   Verbal instructions on how to setup pump and how to use initiation phase   []   Written sheet\" How to keep your breast pump kit clean\"   []   Expectation sheet for Breastfeeding mothers with pumping log   [x]   Frequency of pumping   [x]   Collection,labeling and storage of colostrum and milk    Supplies Provided   [x]   Pump initiation kit   [x]   Cleaning supplies (basin and soap)   []   Additional flange size   [x]   Oral syringes/snappies   []   Patient labels       -

## 2024-12-05 NOTE — ANESTHESIA PROCEDURE NOTES
Spinal Block    Patient location during procedure: OB  End time: 12/5/2024 12:21 PM  Reason for block: primary anesthetic  Staffing  Performed: resident/CRNA   Anesthesiologist: Ray Duvall MD  Resident/CRNA: Estee Turk APRN - CRNA  Performed by: Estee Turk APRN - CRNA  Authorized by: Ray Duvall MD    Spinal Block  Patient position: sitting  Prep: Betasept  Patient monitoring: continuous pulse ox, continuous capnometry and frequent blood pressure checks  Approach: midline  Location: L3/L4  Provider prep: mask and sterile gloves  Local infiltration: lidocaine  Needle  Needle type: Quincke   Needle gauge: 24 G  Needle length: 4.75 in  Assessment  Sensory level: T4  Events: cerebrospinal fluid  Swirl obtained: Yes  CSF: clear  Attempts: 1  Hemodynamics: stable  Preanesthetic Checklist  Completed: patient identified, IV checked, site marked, risks and benefits discussed, surgical/procedural consents, equipment checked, pre-op evaluation, timeout performed, anesthesia consent given, oxygen available, monitors applied/VS acknowledged, fire risk safety assessment completed and verbalized and blood product R/B/A discussed and consented

## 2024-12-05 NOTE — FLOWSHEET NOTE
To Recovery,  at bedside.  Tearful about baby in NICU.  Support given and ventilation of feelings encouraged.

## 2024-12-05 NOTE — OP NOTE
identified and entered bluntly. Bovie electrocautery was used to take portions of the peritoneum down sharply. Peritoneal incision was extended longitudinally with blunt stretch, bladder retractor was placed. A 2x2 cm uterine window was noted in the lower uterine segment. A low transverse uterine incision was made using a new scalpel blade. Blunt stretch on the hysterotomy incision was made and the amniotomy was performed revealing clear fluid.     Delivered from cephalic presentation was a Live Born male infant. The infant was suctioned, dried and the umbilical cord was clamped and cut after one minute delayed cord clamping. The infant was taken to the warmer and attended by NICU for evaluation. A second section of cord was clamped and cut and sent for gases. Cord blood was obtained for evaluation. The placenta was removed manually and spontaneously with gentle traction and appeared intact, whole, and that the umbilical cord had three vessels noted. Pitocin was started. The uterine outline appeared normal. The uterus was cleaned of all clots and debris. The uterine incision was closed with running locked sutures of 0 Vicryl starting at each corner and overlapping in the middle.  Hemostasis was observed. Bilateral abdominal gutters were cleared of all clots and debris. Bilateral tubes and ovaries were visualized and appeared normal. The hysterotomy was again inspected and found to be hemostatic.  Abdomen was copiously irrigated with sterile water. Rectus muscles were inspected and found to be hemostatic. The fascia was then reapproximated with running sutures of 0 Vicryl starting at each corner and overlapping in the middle. The subcuticular space was irrigated copiously. The subcuticular space was closed using a 3-0 Vicryl suture in a running fashion. The skin was reapproximated with aInsorb stapler. The skin was then cleansed and dressed with a Prevena dressing in sterile fashion.     Instrument, sponge, and

## 2024-12-05 NOTE — FLOWSHEET NOTE
Patient admitted to room 746 from L&D via stretcher.   Oriented to room and surroundings.  Plan of care reviewed.  Verbalized understanding.  Instructed on infant security and safe sleep practices.  Preventing falls education provided .The following handouts given: A New Beginning: Your Guide to Postpartum Care, Rounding, gs Security System,Babies Cry A lot, Safe Sleep, Security and Visitation Guidelines.   Call light placed within reach.

## 2024-12-05 NOTE — BRIEF OP NOTE
Department of Obstetrics and Gynecology  Obstetrical Brief Operative Report  [facility]    Patient: Magda Alegria   : 1990  MRN: [id number]       Acct: [id number]   Date of Procedure: 24    Pre-operative Diagnosis: 34 y.o. female  at 39w4d    section secondary to history x1 declining TOLAC  Hypothyroidism  History of Preeclampsia (G1)  Family history of type 2 diabetes mellitus  BMI 48     Post-operative Diagnosis: [place] living infant    section secondary to history x1 declining TOLAC  Uterine Window   Hypothyroidism  History of Preeclampsia (G1)  Family history of type 2 diabetes mellitus  BMI 48     Procedure: repeat low transverse  section    Surgeon: Dr. Mahendra DO  Assistant(s): Yessenia Kelly DO, PGY2    Anesthesia: Spinal with Duramorph    Information for the patient's :  Darwin Alegria [[id number]]   male   Birth Weight: N/A  Information for the patient's :  Darwin Alegria [[id number]]        Findings:  Live Born weight pending male infant in cephalic presentation with Apgars of 8 at 1 minute and 9 at five minutes, normal appearing uterus tubes and ovaries, thin lower uterine segment with 2x2 cm  uterine window  Estimated Blood Loss: QBL pending immediately post-operatively  Total IV Fluids: 1000 mL  Urine output: 600 mL clear urine   Drains:  sands catheter  Specimens:  cord blood, and cord gases  Instrument and Sponge Count: Correct  Complications: none  Condition: Infant stable, transfer to NICU, Mother stable, transfer to post anesthesia recovery      Yessenia Kelly DO  Ob/Gyn Resident  2024, 1:29 PM     No

## 2024-12-06 LAB
BASOPHILS # BLD: 0.03 K/UL (ref 0–0.2)
BASOPHILS NFR BLD: 0 % (ref 0–2)
EOSINOPHIL # BLD: 0.08 K/UL (ref 0–0.44)
EOSINOPHILS RELATIVE PERCENT: 1 % (ref 1–4)
ERYTHROCYTE [DISTWIDTH] IN BLOOD BY AUTOMATED COUNT: 14 % (ref 11.8–14.4)
HCT VFR BLD AUTO: 33.3 % (ref 36.3–47.1)
HGB BLD-MCNC: 10.4 G/DL (ref 11.9–15.1)
IMM GRANULOCYTES # BLD AUTO: 0.04 K/UL (ref 0–0.3)
IMM GRANULOCYTES NFR BLD: 0 %
LYMPHOCYTES NFR BLD: 3.26 K/UL (ref 1.1–3.7)
LYMPHOCYTES RELATIVE PERCENT: 30 % (ref 24–43)
MCH RBC QN AUTO: 27.6 PG (ref 25.2–33.5)
MCHC RBC AUTO-ENTMCNC: 31.2 G/DL (ref 28.4–34.8)
MCV RBC AUTO: 88.3 FL (ref 82.6–102.9)
MONOCYTES NFR BLD: 0.85 K/UL (ref 0.1–1.2)
MONOCYTES NFR BLD: 8 % (ref 3–12)
NEUTROPHILS NFR BLD: 61 % (ref 36–65)
NEUTS SEG NFR BLD: 6.78 K/UL (ref 1.5–8.1)
NRBC BLD-RTO: 0 PER 100 WBC
PLATELET # BLD AUTO: 276 K/UL (ref 138–453)
PMV BLD AUTO: 10.1 FL (ref 8.1–13.5)
RBC # BLD AUTO: 3.77 M/UL (ref 3.95–5.11)
WBC OTHER # BLD: 11 K/UL (ref 3.5–11.3)

## 2024-12-06 PROCEDURE — 99024 POSTOP FOLLOW-UP VISIT: CPT | Performed by: OBSTETRICS & GYNECOLOGY

## 2024-12-06 PROCEDURE — 6370000000 HC RX 637 (ALT 250 FOR IP)

## 2024-12-06 PROCEDURE — 6360000002 HC RX W HCPCS

## 2024-12-06 PROCEDURE — 2580000003 HC RX 258

## 2024-12-06 PROCEDURE — 1220000000 HC SEMI PRIVATE OB R&B

## 2024-12-06 PROCEDURE — 85025 COMPLETE CBC W/AUTO DIFF WBC: CPT

## 2024-12-06 PROCEDURE — 36415 COLL VENOUS BLD VENIPUNCTURE: CPT

## 2024-12-06 RX ORDER — KETOROLAC TROMETHAMINE 30 MG/ML
30 INJECTION, SOLUTION INTRAMUSCULAR; INTRAVENOUS EVERY 6 HOURS
Status: COMPLETED | OUTPATIENT
Start: 2024-12-06 | End: 2024-12-06

## 2024-12-06 RX ORDER — IBUPROFEN 600 MG/1
600 TABLET, FILM COATED ORAL EVERY 6 HOURS
Status: DISCONTINUED | OUTPATIENT
Start: 2024-12-06 | End: 2024-12-09 | Stop reason: HOSPADM

## 2024-12-06 RX ADMIN — ENOXAPARIN SODIUM 70 MG: 100 INJECTION SUBCUTANEOUS at 08:54

## 2024-12-06 RX ADMIN — KETOROLAC TROMETHAMINE 30 MG: 30 INJECTION, SOLUTION INTRAMUSCULAR; INTRAVENOUS at 13:52

## 2024-12-06 RX ADMIN — POLYETHYLENE GLYCOL 3350 17 G: 17 POWDER, FOR SOLUTION ORAL at 08:54

## 2024-12-06 RX ADMIN — METRONIDAZOLE 500 MG: 500 TABLET ORAL at 05:33

## 2024-12-06 RX ADMIN — METRONIDAZOLE 500 MG: 500 TABLET ORAL at 22:40

## 2024-12-06 RX ADMIN — SENNOSIDES AND DOCUSATE SODIUM 2 TABLET: 50; 8.6 TABLET ORAL at 08:54

## 2024-12-06 RX ADMIN — Medication 2000 MG: at 05:33

## 2024-12-06 RX ADMIN — ACETAMINOPHEN 1000 MG: 500 TABLET ORAL at 01:57

## 2024-12-06 RX ADMIN — ACETAMINOPHEN 1000 MG: 500 TABLET ORAL at 08:54

## 2024-12-06 RX ADMIN — SODIUM CHLORIDE, PRESERVATIVE FREE 10 ML: 5 INJECTION INTRAVENOUS at 13:53

## 2024-12-06 RX ADMIN — METRONIDAZOLE 500 MG: 500 TABLET ORAL at 13:59

## 2024-12-06 RX ADMIN — OXYCODONE 5 MG: 5 TABLET ORAL at 22:44

## 2024-12-06 RX ADMIN — SODIUM CHLORIDE, PRESERVATIVE FREE 10 ML: 5 INJECTION INTRAVENOUS at 08:55

## 2024-12-06 RX ADMIN — CEPHALEXIN 500 MG: 500 CAPSULE ORAL at 22:40

## 2024-12-06 RX ADMIN — SIMETHICONE 80 MG: 80 TABLET, CHEWABLE ORAL at 08:51

## 2024-12-06 RX ADMIN — LEVOTHYROXINE SODIUM 88 MCG: 0.09 TABLET ORAL at 08:54

## 2024-12-06 RX ADMIN — SENNOSIDES AND DOCUSATE SODIUM 2 TABLET: 50; 8.6 TABLET ORAL at 20:00

## 2024-12-06 RX ADMIN — Medication 1 TABLET: at 08:54

## 2024-12-06 RX ADMIN — ACETAMINOPHEN 1000 MG: 500 TABLET ORAL at 18:51

## 2024-12-06 RX ADMIN — CEPHALEXIN 500 MG: 500 CAPSULE ORAL at 13:59

## 2024-12-06 RX ADMIN — KETOROLAC TROMETHAMINE 30 MG: 30 INJECTION, SOLUTION INTRAMUSCULAR; INTRAVENOUS at 05:33

## 2024-12-06 RX ADMIN — SODIUM CHLORIDE, PRESERVATIVE FREE 10 ML: 5 INJECTION INTRAVENOUS at 21:00

## 2024-12-06 RX ADMIN — IBUPROFEN 600 MG: 600 TABLET, FILM COATED ORAL at 20:01

## 2024-12-06 ASSESSMENT — PAIN DESCRIPTION - LOCATION
LOCATION: ABDOMEN;INCISION
LOCATION: ABDOMEN
LOCATION: INCISION
LOCATION: ABDOMEN;INCISION
LOCATION: ABDOMEN;INCISION
LOCATION: INCISION

## 2024-12-06 ASSESSMENT — PAIN - FUNCTIONAL ASSESSMENT
PAIN_FUNCTIONAL_ASSESSMENT: ACTIVITIES ARE NOT PREVENTED

## 2024-12-06 ASSESSMENT — PAIN DESCRIPTION - ORIENTATION
ORIENTATION: LOWER

## 2024-12-06 ASSESSMENT — PAIN DESCRIPTION - DESCRIPTORS
DESCRIPTORS: ACHING;SORE
DESCRIPTORS: ACHING;SORE
DESCRIPTORS: ACHING;CRAMPING;SORE
DESCRIPTORS: CRAMPING;SORE
DESCRIPTORS: DISCOMFORT
DESCRIPTORS: SORE;ACHING

## 2024-12-06 ASSESSMENT — PAIN SCALES - GENERAL
PAINLEVEL_OUTOF10: 2
PAINLEVEL_OUTOF10: 3
PAINLEVEL_OUTOF10: 3
PAINLEVEL_OUTOF10: 4
PAINLEVEL_OUTOF10: 5

## 2024-12-06 NOTE — ANESTHESIA POSTPROCEDURE EVALUATION
Department of Anesthesiology  Postprocedure Note    Patient: Magda Alegria  MRN: [id number]  YOB: 1990  Date of evaluation: 2024    Procedure Summary       Date: 24 Room / Location: [facility] OR [address] [place] [address]    Anesthesia Start: 1211 Anesthesia Stop: 1334    Procedure:  SECTION (Abdomen) Diagnosis:       History of       (History of  [Z98.891])    Surgeons: Jerrell Mccray DO Responsible Provider: Julio Adam MD    Anesthesia Type: regional, spinal ASA Status: 3            Anesthesia Type: No value filed.    Jacob Phase I: Jacob Score: 10    Jacob Phase II:      Anesthesia Post Evaluation    Patient location during evaluation: bedside  Patient participation: complete - patient participated  Level of consciousness: awake and alert  Airway patency: patent  Nausea & Vomiting: no nausea and no vomiting  Cardiovascular status: hemodynamically stable  Respiratory status: acceptable  Hydration status: stable  Comments: BP (!) 97/58   Pulse (!) 48   Temp 98.1 °F (36.7 °C) (Oral)   Resp 17   LMP 2024 (Exact Date)   SpO2 100%   Breastfeeding Unknown     Pain management: adequate    No notable events documented.

## 2024-12-06 NOTE — CARE COORDINATION
Social Work     Sw reviewed medical record (current active problem list) and tox screens and found no current concerns.     Sw spoke with mom briefly to explain Sw role, inquire if any needs or concerns, and provide safe sleep education and discuss.  Mom denied any needs or questions and informs baby has a safe sleep environment (pnp, bass x2).     Mom denied any current s/s of anxiety or depression and is aware to reach out to OB if any s/s occur after dc.    Mom also reports she is active in therapy.     Mom reports a really good support system and denied any current questions or needs.      Mom reports this is her 2nd child, she also has a 2 year old daughter.       Mom states ped will be [facility] (Dr. Philippe).      Sw encouraged mom to reach out if any issues or concerns arise.

## 2024-12-06 NOTE — LACTATION NOTE
Pt states \"I'm not getting much with pumping\".  Reassurance given, discussed pumping schedule and when to expect onset of supply.

## 2024-12-06 NOTE — CARE COORDINATION
CASE MANAGEMENT POST-PARTUM TRANSITIONAL CARE PLAN    History of  [Z98.891]  39 weeks gestation of pregnancy [Z3A.39]    OB Provider: Dr Saldana    Writer met w/ Magda at her bedside to discuss DCP. She is S/P CS on 2024    Writer verified address/phone number correct on facesheet. She states she lives with her  Phil and their 2.5 y.o daughter Emily.  Magda denied barriers with transportation home, to doctor's appointments or with paying for medications upon discharge home.     [employer] insurance correct. Writer notified Magda she has 30 days from date of birth to add  to insurance policy. She verbalized understanding.    Infant name on BC: Fahad Alegria.   Infant PCP Dr Philippe.     DME: no  HOME CARE: no    Anticipated DC of mother and infant 2-4 days after CS delivery.    Readmission Risk              Risk of Unplanned Readmission:  5

## 2024-12-07 PROCEDURE — 1220000000 HC SEMI PRIVATE OB R&B

## 2024-12-07 PROCEDURE — 2580000003 HC RX 258

## 2024-12-07 PROCEDURE — 6370000000 HC RX 637 (ALT 250 FOR IP)

## 2024-12-07 PROCEDURE — 99024 POSTOP FOLLOW-UP VISIT: CPT | Performed by: OBSTETRICS & GYNECOLOGY

## 2024-12-07 PROCEDURE — 6360000002 HC RX W HCPCS

## 2024-12-07 RX ADMIN — ENOXAPARIN SODIUM 70 MG: 100 INJECTION SUBCUTANEOUS at 09:03

## 2024-12-07 RX ADMIN — IBUPROFEN 600 MG: 600 TABLET, FILM COATED ORAL at 15:04

## 2024-12-07 RX ADMIN — ACETAMINOPHEN 1000 MG: 500 TABLET ORAL at 09:04

## 2024-12-07 RX ADMIN — METRONIDAZOLE 500 MG: 500 TABLET ORAL at 14:21

## 2024-12-07 RX ADMIN — ACETAMINOPHEN 1000 MG: 500 TABLET ORAL at 22:11

## 2024-12-07 RX ADMIN — IBUPROFEN 600 MG: 600 TABLET, FILM COATED ORAL at 09:04

## 2024-12-07 RX ADMIN — SENNOSIDES AND DOCUSATE SODIUM 2 TABLET: 50; 8.6 TABLET ORAL at 22:11

## 2024-12-07 RX ADMIN — CEPHALEXIN 500 MG: 500 CAPSULE ORAL at 22:11

## 2024-12-07 RX ADMIN — METRONIDAZOLE 500 MG: 500 TABLET ORAL at 05:49

## 2024-12-07 RX ADMIN — ACETAMINOPHEN 1000 MG: 500 TABLET ORAL at 15:04

## 2024-12-07 RX ADMIN — Medication 1 TABLET: at 09:03

## 2024-12-07 RX ADMIN — LEVOTHYROXINE SODIUM 88 MCG: 0.09 TABLET ORAL at 07:02

## 2024-12-07 RX ADMIN — SIMETHICONE 80 MG: 80 TABLET, CHEWABLE ORAL at 09:10

## 2024-12-07 RX ADMIN — ACETAMINOPHEN 1000 MG: 500 TABLET ORAL at 02:00

## 2024-12-07 RX ADMIN — CEPHALEXIN 500 MG: 500 CAPSULE ORAL at 14:21

## 2024-12-07 RX ADMIN — SENNOSIDES AND DOCUSATE SODIUM 2 TABLET: 50; 8.6 TABLET ORAL at 09:03

## 2024-12-07 RX ADMIN — IBUPROFEN 600 MG: 600 TABLET, FILM COATED ORAL at 22:11

## 2024-12-07 RX ADMIN — POLYETHYLENE GLYCOL 3350 17 G: 17 POWDER, FOR SOLUTION ORAL at 09:03

## 2024-12-07 RX ADMIN — SODIUM CHLORIDE, PRESERVATIVE FREE 10 ML: 5 INJECTION INTRAVENOUS at 09:05

## 2024-12-07 RX ADMIN — IBUPROFEN 600 MG: 600 TABLET, FILM COATED ORAL at 02:00

## 2024-12-07 RX ADMIN — CEPHALEXIN 500 MG: 500 CAPSULE ORAL at 05:49

## 2024-12-07 ASSESSMENT — PAIN - FUNCTIONAL ASSESSMENT
PAIN_FUNCTIONAL_ASSESSMENT: ACTIVITIES ARE NOT PREVENTED

## 2024-12-07 ASSESSMENT — PAIN DESCRIPTION - DESCRIPTORS
DESCRIPTORS: ACHING;CRAMPING
DESCRIPTORS: DISCOMFORT;PRESSURE
DESCRIPTORS: ACHING;SORE

## 2024-12-07 ASSESSMENT — PAIN DESCRIPTION - ORIENTATION
ORIENTATION: LOWER
ORIENTATION: LOWER

## 2024-12-07 ASSESSMENT — PAIN DESCRIPTION - LOCATION
LOCATION: ABDOMEN
LOCATION: INCISION;LEG
LOCATION: ABDOMEN;INCISION

## 2024-12-07 ASSESSMENT — PAIN SCALES - GENERAL
PAINLEVEL_OUTOF10: 4
PAINLEVEL_OUTOF10: 4

## 2024-12-07 NOTE — LACTATION NOTE
Pt states she is getting less volume pumping and is discouraged. Pt has not been pumping every 2-3 hours and states it is because she is visiting in the NICU for hours at a time. Reviewed with her what pump parts/tubing to take to the NICU to use the hospital breast pump at bedside. Also encouraged regular milk removal minimum of every 3 hours. Pt was also using the maintain program on the breast pump. Reviewed how to start with initiation program to improve output. Encouraged pt to call out as needed.

## 2024-12-08 PROBLEM — Z98.891 HISTORY OF CESAREAN SECTION: Status: RESOLVED | Noted: 2024-07-05 | Resolved: 2024-12-08

## 2024-12-08 PROBLEM — Z3A.39 39 WEEKS GESTATION OF PREGNANCY: Status: RESOLVED | Noted: 2024-12-05 | Resolved: 2024-12-08

## 2024-12-08 PROCEDURE — 6370000000 HC RX 637 (ALT 250 FOR IP)

## 2024-12-08 PROCEDURE — 99024 POSTOP FOLLOW-UP VISIT: CPT | Performed by: OBSTETRICS & GYNECOLOGY

## 2024-12-08 PROCEDURE — 6360000002 HC RX W HCPCS

## 2024-12-08 PROCEDURE — 1220000000 HC SEMI PRIVATE OB R&B

## 2024-12-08 RX ADMIN — SIMETHICONE 80 MG: 80 TABLET, CHEWABLE ORAL at 19:35

## 2024-12-08 RX ADMIN — IBUPROFEN 600 MG: 600 TABLET, FILM COATED ORAL at 12:52

## 2024-12-08 RX ADMIN — LEVOTHYROXINE SODIUM 88 MCG: 0.09 TABLET ORAL at 12:51

## 2024-12-08 RX ADMIN — IBUPROFEN 600 MG: 600 TABLET, FILM COATED ORAL at 04:13

## 2024-12-08 RX ADMIN — ACETAMINOPHEN 1000 MG: 500 TABLET ORAL at 04:13

## 2024-12-08 RX ADMIN — IBUPROFEN 600 MG: 600 TABLET, FILM COATED ORAL at 19:34

## 2024-12-08 RX ADMIN — SENNOSIDES AND DOCUSATE SODIUM 2 TABLET: 50; 8.6 TABLET ORAL at 19:34

## 2024-12-08 RX ADMIN — SENNOSIDES AND DOCUSATE SODIUM 2 TABLET: 50; 8.6 TABLET ORAL at 12:52

## 2024-12-08 RX ADMIN — ENOXAPARIN SODIUM 70 MG: 100 INJECTION SUBCUTANEOUS at 12:52

## 2024-12-08 RX ADMIN — CEPHALEXIN 500 MG: 500 CAPSULE ORAL at 12:51

## 2024-12-08 RX ADMIN — ACETAMINOPHEN 1000 MG: 500 TABLET ORAL at 16:50

## 2024-12-08 RX ADMIN — ACETAMINOPHEN 1000 MG: 500 TABLET ORAL at 22:50

## 2024-12-08 ASSESSMENT — PAIN DESCRIPTION - DESCRIPTORS
DESCRIPTORS: ACHING
DESCRIPTORS: DISCOMFORT
DESCRIPTORS: ACHING

## 2024-12-08 ASSESSMENT — PAIN SCALES - GENERAL
PAINLEVEL_OUTOF10: 2
PAINLEVEL_OUTOF10: 3

## 2024-12-08 ASSESSMENT — PAIN DESCRIPTION - ORIENTATION: ORIENTATION: MID;LOWER

## 2024-12-08 ASSESSMENT — PAIN DESCRIPTION - LOCATION
LOCATION: ABDOMEN
LOCATION: INCISION
LOCATION: ABDOMEN;INCISION

## 2024-12-08 NOTE — PROGRESS NOTES
Obstetrical Rounds:    POD/PPD #: 3  Hospital Day: 4  Procedure: repeat  section    Date: 2024  Time: 10:30 AM        Patient Name: Magda Alegria  Patient : 1990  Room/Bed: 0746/0746-01  Admission Date/Time: 2024  5:50 AM  MRN #: [id number]  [facility] #: [id number]        Attending Physician Statement  I have discussed the care of Magda Alegria, including pertinent history and exam findings,  with the resident. I have reviewed their note in the electronic medical record. I have seen and examined the patient and the key elements of all parts of the encounter have been performed/reviewed by me .  I agree with the assessment, plan and orders as documented by the resident. Pt in NICU upon rounding. Per nursing & residents pt doing well and stable. Pt plans discharge today if baby discharged from NICU or will want to wait until tomorrow     Vitals:    24 0413   BP: 114/71   Pulse: 63   Resp: 16   Temp: 98.1 °F (36.7 °C)   SpO2:        Admission on 2024   Component Date Value Ref Range Status    Blood Bank Sample Expiration 2024,2359   Final    Arm Band Number 2024 [id number]   Final    ABO/Rh 2024 O POSITIVE   Final    Antibody Screen 2024 NEGATIVE   Final    WBC 2024 9.3  3.5 - 11.3 k/uL Final    RBC 2024 4.51  3.95 - 5.11 m/uL Final    Hemoglobin 2024 12.5  11.9 - 15.1 g/dL Final    Hematocrit 2024 38.4  36.3 - 47.1 % Final    MCV 2024 85.1  82.6 - 102.9 fL Final    MCH 2024 27.7  25.2 - 33.5 pg Final    MCHC 2024 32.6  28.4 - 34.8 g/dL Final    RDW 2024 13.6  11.8 - 14.4 % Final    Platelets 2024 350  138 - 453 k/uL Final    MPV 2024 9.8  8.1 - 13.5 fL Final    NRBC Automated 2024 0.0  0.0 per 100 WBC Final    Amphetamine Screen, Ur 2024 NEGATIVE  NEGATIVE Final    Cutoff: 1000 ng/mL    Barbiturate Screen, Ur 2024 NEGATIVE  NEGATIVE Final    Cutoff: 200 ng/ml    
CLINICAL PHARMACY NOTE: MEDS TO BEDS    Total # of Prescriptions Filled: 6   The following medications were delivered to the patient:  Gas relief 80mg chew  Ibuprofen 600mg  Senexon-s 8.6-50  Ondansetron 4mg odt  Acetaminophen 500mg  Oxycodone 5mg    Additional Documentation: dropped off to patient in room 746 on 12/8/24 at 12:30pm by di aguirre. Copay was 21.09 and paid by [employer]  
POST OPERATIVE DAY # 1    Magda Alegria is a 34 y.o. female   This patient was seen and examined today. RLTCS on 24    Her pregnancy was complicated by:   Patient Active Problem List   Diagnosis    PCOS    Anxiety    GERD    Hypothyroid    COVID-19 vaccine administered    Preeclampsia w/o SF's (G1)    Migraine without aura and without status migrainosus, not intractable    Vitamin D deficiency    Endometriosis    Dysmenorrhea    History of  section    39 weeks gestation of pregnancy    Hx CS x 1    RLTCS 24 M Apg 8/9 Wt **       Today she is doing well without any chief complaint. Her lochia is light. She denies chest pain, shortness of breath, headache, lightheadedness, blurred vision and peripheral edema. She is breast feeding and she denies any signs or symptoms of mastitis.  She is ambulating well. She is voiding without difficulty. She currently denies S/S of postpartum depression. Flatus absent.  Bowel movement absent. She is tolerating solids.    Vital Signs:  Vitals:    24 1645 24 2001 24 0000 24 0530   BP: (!) 111/58 113/70 (!) 110/56 (!) 97/58   Pulse: 58 76 (!) 46 (!) 48   Resp: 18 18 18 17   Temp: 98.1 °F (36.7 °C) 97.7 °F (36.5 °C) 97.9 °F (36.6 °C) 98.1 °F (36.7 °C)   TempSrc: Oral Oral Oral Oral   SpO2: 100% 100%         Urine Input & Output last 24hrs:     Intake/Output Summary (Last 24 hours) at 2024 0103  Last data filed at 2024 1552  Gross per 24 hour   Intake 3574.79 ml   Output 715 ml   Net 2859.79 ml       Physical Exam:  General:  no apparent distress, alert and cooperative  Neurologic:  alert, oriented, normal speech, no focal findings or movement disorder noted  Lungs:  No increased work of breathing, good air exchange, clear to auscultation bilaterally, no crackles or wheezing  Heart:  Regular rate  Abdomen: abdomen soft, non-distended, non-tender  Fundus: non-tender, firm, below umbilicus  Incision: Prevena dressing in place and 
POST OPERATIVE DAY # 3    Magda Alegria is a 34 y.o. female   This patient was seen and examined today. RLTCS on 24    Her pregnancy was complicated by:   Patient Active Problem List   Diagnosis    PCOS    Anxiety    GERD    Hypothyroid    COVID-19 vaccine administered    Preeclampsia w/o SF's (G1)    Migraine without aura and without status migrainosus, not intractable    Vitamin D deficiency    Endometriosis    Dysmenorrhea    History of  section    39 weeks gestation of pregnancy    Hx CS x 1    RLTCS 24 M Apg 8/9 Wt 8#2       Today she is doing well without any chief complaint. Her lochia is light. She denies chest pain, shortness of breath, headache, and blurred vision. She is breast pumping to feed and she denies any signs or symptoms of mastitis.  She is ambulating well. She is voiding without difficulty. She currently denies S/S of postpartum depression. Flatus present.  Bowel movement absent. She is tolerating solids.    Vital Signs:  Vitals:    24 0550 24 0845 24 1504 24 2211   BP: 122/76 126/72 129/75 127/83   Pulse: 81 71 71 66   Resp: 16 18 18 16   Temp: 97.5 °F (36.4 °C) 98.1 °F (36.7 °C) 98.1 °F (36.7 °C) 98.1 °F (36.7 °C)   TempSrc: Oral Oral Oral Oral   SpO2:  100% 100% 98%         Urine Input & Output last 24hrs:   No intake or output data in the 24 hours ending 24 0142      Physical Exam:  General:  no apparent distress, alert, and cooperative  Neurologic:  alert, oriented, normal speech, no focal findings or movement disorder noted  Lungs:  no increased work of breathing, no conversational dyspnea  Heart:  regular rate     Abdomen: abdomen soft, non-distended, non-tender  Fundus: non-tender, normal size, firm, below umbilicus  Incision: Prevena dressing in place, functioning   Extremities:  no calf tenderness, non edematous    Labs:  Lab Results   Component Value Date    WBC 11.0 2024    HGB 10.4 (L) 2024    HCT 33.3 (L) 2024    
POST OPERATIVE DAY # 4    Magda Alegria is a 34 y.o. female   This patient was seen and examined today. RLTCS on 24    Her pregnancy was complicated by:   Patient Active Problem List   Diagnosis    PCOS    Anxiety    GERD    Hypothyroid    COVID-19 vaccine administered    Preeclampsia w/o SF's (G1)    Migraine without aura and without status migrainosus, not intractable    Vitamin D deficiency    Endometriosis    Dysmenorrhea    Hx CS x 2    RLTCS 24 M Apg 8/9 Wt 8#2       Today she is doing well without any chief complaint. Her lochia is light. She denies chest pain, shortness of breath, headache, and blurred vision. She is breast pumping to feed and she denies any signs or symptoms of mastitis.  She is ambulating well. She is voiding without difficulty. She currently denies S/S of postpartum depression. Flatus present.  Bowel movement present. She is tolerating solids.    Vital Signs:  Vitals:    24 0413 24 1230 24 1652 24 1934   BP: 114/71 128/82 125/77 133/83   Pulse: 63 75 68 77   Resp: 16 16 18 18   Temp: 98.1 °F (36.7 °C) 98.1 °F (36.7 °C) 98.2 °F (36.8 °C) 97.6 °F (36.4 °C)   TempSrc: Oral Oral  Oral   SpO2:  98%  98%       Physical Exam:  General:  no apparent distress, alert, and cooperative  Neurologic:  alert, oriented, normal speech, no focal findings or movement disorder noted  Lungs:  no increased work of breathing  Heart:  regular rate     Abdomen: abdomen soft, non-distended, non-tender  Fundus: non-tender, normal size, firm, below umbilicus  Incision: Prevena dressing in place and functioning  Extremities:  no calf tenderness, non edematous    Labs:  Lab Results   Component Value Date    WBC 11.0 2024    HGB 10.4 (L) 2024    HCT 33.3 (L) 2024    MCV 88.3 2024     2024       Assessment/Plan:  Magda Alegria is a  POD # 4 s/p RLTCS   - Doing well, VSS    - Male infant in NICU, circumcision complete   - Encourage ambulation 
2024    HGB 10.4 (L) 2024    HCT 33.3 (L) 2024    MCV 88.3 2024     2024       Assessment/Plan:  Magda Alegria is a  POD # 2 s/p RLTCS   - Doing well, VSS    - Male infant in NICU, circumcision desired and consented   - Encourage ambulation and use of incentive spirometer   - S/p sands catheter and saline lock IV on POD #1    - CBC completed, stable   - Pain control: Motrin/Tylenol/Jolanta  - S/p Ancef, Keflex, Flagyl postoperatively              - Lovenox 70 mg QD for DVT prophylaxis               - Scop patch    Rh positive/Rubella immune   - Rhogam/MMR not indicated    Pumping to feed   - S/s of mastitis reviewed  - Patient denies sx at this time    Hypothyroidism              - Clinically asymptomatic              - Currently on Synthroid 88 mcg which was also her pre-pregnancy dosing    BMI 48    Continue post-op care      Counseling Completed:  Secondary Smoke risks and Sudden Infant Death Syndrome were reviewed with recommendations. Infant sleeping, \"back to sleep\" and avoidance of co-sleeping recommendations were reviewed.  Signs and Symptoms of Post Partum Depression were reviewed. The patient is to call if any occur.  Signs and symptoms of Mastitis were reviewed. The patient is to call if any occur for follow up.  Discharge instructions including pelvic rest, incision care, 15 lb weight restriction, no driving with pain medicine and office follow-up were reviewed with patient     Attending Physician: Dr. Gurwinder Allen MD  Ob/Gyn Resident  2024, 8:59 AM   
yulisa. If you tolerate that, you can slowly go back to your regular diet. Stay away from anything greasy or spicy right after surgery. These types of foods can upset your stomach. Drink lots of fluids to prevent constipation.   Physical Activity    Do not lift anything heavier than your baby.    When shifting positions, use a pillow to support the area where the incisions were made.    Get up slowly. This will help you to avoid feeling dizzy or light headed.    Try to move around each day. Light physical activity will help with your recovery.    Ask your doctor when you will be able to go back to work.    Do not drive unless your doctor has given you permission to do so. Do not drive if you are taking prescription pain medicine.    Ask your doctor when you will be able to resume sexual activity. If you have not done so already, talk to your doctor about birth control options.    Medications    Your doctor may recommend pain medicine to ease discomfort.   If you are taking medicines, follow these general guidelines:   Take your medicine as directed. Do not change the amount or the schedule.   Do not stop taking them without talking to your doctor.   Do not share them.   Know what the results and side effects. Report them to your doctor.   Some drugs can be dangerous when mixed. Talk to a doctor or pharmacist if you are taking more than one drug. This includes over-the-counter medicine and herb or dietary supplements.   Plan ahead for refills so you don't run out.   Lifestyle Changes    You and your doctor will plan lifestyle changes that will help you recover. To get encouragement and learn strategies, consider joining a support group for new mothers.   Follow-up   Make a follow-up appointment as directed by your doctor.   Call Your Doctor If Any of the Following Occurs   After you leave the hospital, call your doctor if any of the following occurs:   Signs of infection, including fever and chills   Heavy vaginal 
resume sexual activity. If you have not done so already, talk to your doctor about birth control options.    Medications    Your doctor may recommend pain medicine to ease discomfort.   If you are taking medicines, follow these general guidelines:   Take your medicine as directed. Do not change the amount or the schedule.   Do not stop taking them without talking to your doctor.   Do not share them.   Know what the results and side effects. Report them to your doctor.   Some drugs can be dangerous when mixed. Talk to a doctor or pharmacist if you are taking more than one drug. This includes over-the-counter medicine and herb or dietary supplements.   Plan ahead for refills so you don't run out.   Lifestyle Changes    You and your doctor will plan lifestyle changes that will help you recover. To get encouragement and learn strategies, consider joining a support group for new mothers.   Follow-up   Make a follow-up appointment as directed by your doctor.   Call Your Doctor If Any of the Following Occurs   After you leave the hospital, call your doctor if any of the following occurs:   Signs of infection, including fever and chills   Heavy vaginal bleeding   Foul-smelling vaginal discharge   Excessive bleeding, redness, swelling, increasing pain or discharge from the incision site   Nausea and/or vomiting that you cannot control with the medicines you were given after surgery, or which persist for more than two days after discharge from the hospital   Pain that you cannot control with the medicines you have been given   Swelling and/or pain in one or both legs   Cough, shortness of breath, or chest pain   Joint pain, fatigue, stiffness, rash, or other new symptoms   Become dizzy or faint   If you think you have an emergency,  CALL 911  .        Home care, Restrictions,  Follow up Care, and birth control review completed    RTO 1-2 weeks    Secondary Smoke risks and Sudden Infant Death Syndrome were reviewed with

## 2024-12-09 VITALS
HEART RATE: 66 BPM | SYSTOLIC BLOOD PRESSURE: 130 MMHG | DIASTOLIC BLOOD PRESSURE: 83 MMHG | TEMPERATURE: 97.8 F | OXYGEN SATURATION: 98 % | RESPIRATION RATE: 18 BRPM

## 2024-12-09 PROCEDURE — 6360000002 HC RX W HCPCS

## 2024-12-09 PROCEDURE — 6370000000 HC RX 637 (ALT 250 FOR IP)

## 2024-12-09 RX ADMIN — Medication 1 TABLET: at 07:54

## 2024-12-09 RX ADMIN — LEVOTHYROXINE SODIUM 88 MCG: 0.09 TABLET ORAL at 06:30

## 2024-12-09 RX ADMIN — IBUPROFEN 600 MG: 600 TABLET, FILM COATED ORAL at 01:06

## 2024-12-09 RX ADMIN — ACETAMINOPHEN 1000 MG: 500 TABLET ORAL at 06:30

## 2024-12-09 RX ADMIN — SENNOSIDES AND DOCUSATE SODIUM 2 TABLET: 50; 8.6 TABLET ORAL at 07:54

## 2024-12-09 RX ADMIN — ENOXAPARIN SODIUM 70 MG: 100 INJECTION SUBCUTANEOUS at 07:53

## 2024-12-09 RX ADMIN — IBUPROFEN 600 MG: 600 TABLET, FILM COATED ORAL at 06:30

## 2024-12-09 RX ADMIN — POLYETHYLENE GLYCOL 3350 17 G: 17 POWDER, FOR SOLUTION ORAL at 07:54

## 2024-12-09 ASSESSMENT — PAIN SCALES - GENERAL
PAINLEVEL_OUTOF10: 4
PAINLEVEL_OUTOF10: 3

## 2024-12-09 NOTE — PLAN OF CARE
Problem: Pain  Goal: Verbalizes/displays adequate comfort level or baseline comfort level  12/7/2024 1507 by Cahty Chambers, RN  Outcome: Progressing  12/7/2024 0554 by Patricia Garcia, RN  Outcome: Progressing     
  Problem: Pain  Goal: Verbalizes/displays adequate comfort level or baseline comfort level  2024 by Marta Holland RN  Outcome: Progressing     Problem: Postpartum  Goal: Experiences normal postpartum course  Description:  Postpartum OB-Pregnancy care plan goal which identifies if the mother is experiencing a normal postpartum course  2024 by Marta Holland RN  Outcome: Progressing     Problem: Postpartum  Goal: Appropriate maternal -  bonding  Description:  Postpartum OB-Pregnancy care plan goal which identifies if the mother and  are bonding appropriately  2024 by Marta Holland RN  Outcome: Progressing     Problem: Postpartum  Goal: Establishment of infant feeding pattern  Description:  Postpartum OB-Pregnancy care plan goal which identifies if the mother is establishing a feeding pattern with their   2024 by Marta Holland RN  Outcome: Progressing     Problem: Postpartum  Goal: Incisions, wounds, or drain sites healing without S/S of infection  2024 by Marta Holland RN  Outcome: Progressing     Problem: Infection - Adult  Goal: Absence of infection at discharge  2024 by Marta Holland RN  Outcome: Progressing     Problem: Infection - Adult  Goal: Absence of infection during hospitalization  2024 by Marta Holland RN  Outcome: Progressing     Problem: Infection - Adult  Goal: Absence of fever/infection during anticipated neutropenic period  2024 by Marta Holland RN  Outcome: Progressing     Problem: Safety - Adult  Goal: Free from fall injury  2024 by Marta Holland RN  Outcome: Progressing     Problem: Discharge Planning  Goal: Discharge to home or other facility with appropriate resources  2024 by Marta Holland RN  Outcome: Progressing     Problem: Chronic Conditions and Co-morbidities  Goal: Patient's chronic conditions and 
  Problem: Pain  Goal: Verbalizes/displays adequate comfort level or baseline comfort level  Outcome: Progressing     Problem: Postpartum  Goal: Experiences normal postpartum course  Description:  Postpartum OB-Pregnancy care plan goal which identifies if the mother is experiencing a normal postpartum course  Outcome: Progressing  Goal: Appropriate maternal -  bonding  Description:  Postpartum OB-Pregnancy care plan goal which identifies if the mother and  are bonding appropriately  Outcome: Progressing  Goal: Establishment of infant feeding pattern  Description:  Postpartum OB-Pregnancy care plan goal which identifies if the mother is establishing a feeding pattern with their   Outcome: Progressing  Goal: Incisions, wounds, or drain sites healing without S/S of infection  Outcome: Progressing     Problem: Infection - Adult  Goal: Absence of infection at discharge  Outcome: Progressing  Goal: Absence of infection during hospitalization  Outcome: Progressing  Goal: Absence of fever/infection during anticipated neutropenic period  Outcome: Progressing     Problem: Safety - Adult  Goal: Free from fall injury  Outcome: Progressing     Problem: Discharge Planning  Goal: Discharge to home or other facility with appropriate resources  Outcome: Progressing     Problem: Chronic Conditions and Co-morbidities  Goal: Patient's chronic conditions and co-morbidity symptoms are monitored and maintained or improved  Outcome: Progressing     
  Problem: Pain  Goal: Verbalizes/displays adequate comfort level or baseline comfort level  Outcome: Progressing     Problem: Postpartum  Goal: Experiences normal postpartum course  Description:  Postpartum OB-Pregnancy care plan goal which identifies if the mother is experiencing a normal postpartum course  Outcome: Progressing  Goal: Appropriate maternal -  bonding  Description:  Postpartum OB-Pregnancy care plan goal which identifies if the mother and  are bonding appropriately  Outcome: Progressing  Goal: Establishment of infant feeding pattern  Description:  Postpartum OB-Pregnancy care plan goal which identifies if the mother is establishing a feeding pattern with their   Outcome: Progressing  Goal: Incisions, wounds, or drain sites healing without S/S of infection  Outcome: Progressing     Problem: Infection - Adult  Goal: Absence of infection at discharge  Outcome: Progressing  Goal: Absence of infection during hospitalization  Outcome: Progressing  Goal: Absence of fever/infection during anticipated neutropenic period  Outcome: Progressing     Problem: Safety - Adult  Goal: Free from fall injury  Outcome: Progressing     Problem: Discharge Planning  Goal: Discharge to home or other facility with appropriate resources  Outcome: Progressing     Problem: Chronic Conditions and Co-morbidities  Goal: Patient's chronic conditions and co-morbidity symptoms are monitored and maintained or improved  Outcome: Progressing     
  Problem: Vaginal Birth or  Section  Goal: Fetal and maternal status remain reassuring during the birth process  Description:  Birth OB-Pregnancy care plan goal which identifies if the fetal and maternal status remain reassuring during the birth process  2024 1551 by Cathy Chambers RN  Outcome: Progressing  2024 0550 by Ekta Loo, RN  Outcome: Progressing     
  Problem: Vaginal Birth or  Section  Goal: Fetal and maternal status remain reassuring during the birth process  Description:  Birth OB-Pregnancy care plan goal which identifies if the fetal and maternal status remain reassuring during the birth process  2024 1933 by Patricia Garcia, RN  Outcome: Completed  2024 1551 by Cathy Chambers, RN  Outcome: Progressing  2024 0550 by Ekta Loo, RN  Outcome: Progressing     
  Problem: Vaginal Birth or  Section  Goal: Fetal and maternal status remain reassuring during the birth process  Description:  Birth OB-Pregnancy care plan goal which identifies if the fetal and maternal status remain reassuring during the birth process  Outcome: Progressing     Problem: Pain  Goal: Verbalizes/displays adequate comfort level or baseline comfort level  Outcome: Progressing     Problem: Postpartum  Goal: Experiences normal postpartum course  Description:  Postpartum OB-Pregnancy care plan goal which identifies if the mother is experiencing a normal postpartum course  Outcome: Progressing  Goal: Appropriate maternal -  bonding  Description:  Postpartum OB-Pregnancy care plan goal which identifies if the mother and  are bonding appropriately  Outcome: Progressing  Goal: Establishment of infant feeding pattern  Description:  Postpartum OB-Pregnancy care plan goal which identifies if the mother is establishing a feeding pattern with their   Outcome: Progressing  Goal: Incisions, wounds, or drain sites healing without S/S of infection  Outcome: Progressing     Problem: Infection - Adult  Goal: Absence of infection at discharge  Outcome: Progressing  Goal: Absence of infection during hospitalization  Outcome: Progressing  Goal: Absence of fever/infection during anticipated neutropenic period  Outcome: Progressing     Problem: Safety - Adult  Goal: Free from fall injury  Outcome: Progressing     Problem: Discharge Planning  Goal: Discharge to home or other facility with appropriate resources  Outcome: Progressing     Problem: Chronic Conditions and Co-morbidities  Goal: Patient's chronic conditions and co-morbidity symptoms are monitored and maintained or improved  Outcome: Progressing     
Progressing  12/7/2024 1507 by Cathy Chambers RN  Outcome: Progressing     Problem: Discharge Planning  Goal: Discharge to home or other facility with appropriate resources  12/8/2024 0417 by Patricia Garcia RN  Outcome: Progressing  12/7/2024 1507 by Cathy Chambers RN  Outcome: Progressing     Problem: Chronic Conditions and Co-morbidities  Goal: Patient's chronic conditions and co-morbidity symptoms are monitored and maintained or improved  12/8/2024 0417 by Patricia Garcia RN  Outcome: Progressing  12/7/2024 1507 by Cathy Chambers RN  Outcome: Progressing

## 2024-12-13 ENCOUNTER — HOSPITAL ENCOUNTER (INPATIENT)
Age: 34
LOS: 2 days | Discharge: HOME | DRG: 776 | End: 2024-12-15
Attending: EMERGENCY MEDICINE | Admitting: STUDENT IN AN ORGANIZED HEALTH CARE EDUCATION/TRAINING PROGRAM
Payer: COMMERCIAL

## 2024-12-13 ENCOUNTER — NURSE ONLY (OUTPATIENT)
Dept: OBGYN CLINIC | Age: 34
End: 2024-12-13

## 2024-12-13 VITALS
HEART RATE: 60 BPM | BODY MASS INDEX: 47.09 KG/M2 | SYSTOLIC BLOOD PRESSURE: 140 MMHG | DIASTOLIC BLOOD PRESSURE: 90 MMHG | WEIGHT: 293 LBS | HEIGHT: 66 IN

## 2024-12-13 PROBLEM — Z23 COVID-19 VACCINE ADMINISTERED: Status: RESOLVED | Noted: 2022-04-22 | Resolved: 2024-12-13

## 2024-12-13 PROBLEM — O13.9 GESTATIONAL HYPERTENSION, ANTEPARTUM: Status: ACTIVE | Noted: 2024-12-13

## 2024-12-13 LAB
ALBUMIN SERPL-MCNC: 3.5 G/DL (ref 3.5–5.2)
ALBUMIN/GLOB SERPL: 1.4 {RATIO} (ref 1–2.5)
ALP SERPL-CCNC: 88 U/L (ref 35–104)
ALT SERPL-CCNC: 18 U/L (ref 10–35)
ANION GAP SERPL CALCULATED.3IONS-SCNC: 7 MMOL/L (ref 9–16)
AST SERPL-CCNC: 17 U/L (ref 10–35)
BASOPHILS # BLD: 0.04 K/UL (ref 0–0.2)
BASOPHILS NFR BLD: 1 % (ref 0–2)
BILIRUB SERPL-MCNC: 0.3 MG/DL (ref 0–1.2)
BUN SERPL-MCNC: 10 MG/DL (ref 6–20)
CALCIUM SERPL-MCNC: 8.7 MG/DL (ref 8.6–10.4)
CHLORIDE SERPL-SCNC: 109 MMOL/L (ref 98–107)
CO2 SERPL-SCNC: 24 MMOL/L (ref 20–31)
CREAT SERPL-MCNC: 0.7 MG/DL (ref 0.6–0.9)
CREAT UR-MCNC: 48.9 MG/DL (ref 28–217)
EOSINOPHIL # BLD: 0.12 K/UL (ref 0–0.44)
EOSINOPHILS RELATIVE PERCENT: 2 % (ref 1–4)
ERYTHROCYTE [DISTWIDTH] IN BLOOD BY AUTOMATED COUNT: 13.8 % (ref 11.8–14.4)
GFR, ESTIMATED: >90 ML/MIN/1.73M2
GLUCOSE SERPL-MCNC: 91 MG/DL (ref 74–99)
HCT VFR BLD AUTO: 34.6 % (ref 36.3–47.1)
HGB BLD-MCNC: 11.1 G/DL (ref 11.9–15.1)
IMM GRANULOCYTES # BLD AUTO: 0.06 K/UL (ref 0–0.3)
IMM GRANULOCYTES NFR BLD: 1 %
LYMPHOCYTES NFR BLD: 1.85 K/UL (ref 1.1–3.7)
LYMPHOCYTES RELATIVE PERCENT: 28 % (ref 24–43)
MCH RBC QN AUTO: 27.2 PG (ref 25.2–33.5)
MCHC RBC AUTO-ENTMCNC: 32.1 G/DL (ref 28.4–34.8)
MCV RBC AUTO: 84.8 FL (ref 82.6–102.9)
MONOCYTES NFR BLD: 0.43 K/UL (ref 0.1–1.2)
MONOCYTES NFR BLD: 6 % (ref 3–12)
NEUTROPHILS NFR BLD: 62 % (ref 36–65)
NEUTS SEG NFR BLD: 4.21 K/UL (ref 1.5–8.1)
NRBC BLD-RTO: 0 PER 100 WBC
PLATELET # BLD AUTO: 331 K/UL (ref 138–453)
PMV BLD AUTO: 9.3 FL (ref 8.1–13.5)
POTASSIUM SERPL-SCNC: 3.5 MMOL/L (ref 3.7–5.3)
PROT SERPL-MCNC: 6 G/DL (ref 6.6–8.7)
RBC # BLD AUTO: 4.08 M/UL (ref 3.95–5.11)
SODIUM SERPL-SCNC: 140 MMOL/L (ref 136–145)
TOTAL PROTEIN, URINE: <4 MG/DL
URINE TOTAL PROTEIN CREATININE RATIO: NORMAL (ref 0–0.2)
WBC OTHER # BLD: 6.7 K/UL (ref 3.5–11.3)

## 2024-12-13 PROCEDURE — 2580000003 HC RX 258

## 2024-12-13 PROCEDURE — 6360000002 HC RX W HCPCS

## 2024-12-13 PROCEDURE — 1220000000 HC SEMI PRIVATE OB R&B

## 2024-12-13 PROCEDURE — 84156 ASSAY OF PROTEIN URINE: CPT

## 2024-12-13 PROCEDURE — 99285 EMERGENCY DEPT VISIT HI MDM: CPT

## 2024-12-13 PROCEDURE — 6370000000 HC RX 637 (ALT 250 FOR IP)

## 2024-12-13 PROCEDURE — 96365 THER/PROPH/DIAG IV INF INIT: CPT

## 2024-12-13 PROCEDURE — 96375 TX/PRO/DX INJ NEW DRUG ADDON: CPT

## 2024-12-13 PROCEDURE — 85025 COMPLETE CBC W/AUTO DIFF WBC: CPT

## 2024-12-13 PROCEDURE — 82570 ASSAY OF URINE CREATININE: CPT

## 2024-12-13 PROCEDURE — 80053 COMPREHEN METABOLIC PANEL: CPT

## 2024-12-13 RX ORDER — IBUPROFEN 600 MG/1
600 TABLET, FILM COATED ORAL EVERY 6 HOURS PRN
Status: DISCONTINUED | OUTPATIENT
Start: 2024-12-13 | End: 2024-12-15 | Stop reason: HOSPADM

## 2024-12-13 RX ORDER — MAGNESIUM SULFATE HEPTAHYDRATE 40 MG/ML
4000 INJECTION, SOLUTION INTRAVENOUS ONCE
Status: COMPLETED | OUTPATIENT
Start: 2024-12-13 | End: 2024-12-13

## 2024-12-13 RX ORDER — MAGNESIUM SULFATE HEPTAHYDRATE 40 MG/ML
4000 INJECTION, SOLUTION INTRAVENOUS ONCE
Status: DISCONTINUED | OUTPATIENT
Start: 2024-12-13 | End: 2024-12-13

## 2024-12-13 RX ORDER — NIFEDIPINE 30 MG/1
60 TABLET, EXTENDED RELEASE ORAL DAILY
Status: DISCONTINUED | OUTPATIENT
Start: 2024-12-14 | End: 2024-12-13

## 2024-12-13 RX ORDER — NIFEDIPINE 30 MG/1
30 TABLET, EXTENDED RELEASE ORAL DAILY
Qty: 90 TABLET | Refills: 1 | Status: SHIPPED | OUTPATIENT
Start: 2024-12-13 | End: 2024-12-15

## 2024-12-13 RX ORDER — HYDRALAZINE HYDROCHLORIDE 20 MG/ML
10 INJECTION INTRAMUSCULAR; INTRAVENOUS ONCE
Status: COMPLETED | OUTPATIENT
Start: 2024-12-13 | End: 2024-12-13

## 2024-12-13 RX ORDER — POTASSIUM CHLORIDE 1500 MG/1
40 TABLET, EXTENDED RELEASE ORAL ONCE
Status: COMPLETED | OUTPATIENT
Start: 2024-12-13 | End: 2024-12-13

## 2024-12-13 RX ORDER — NIFEDIPINE 30 MG/1
30 TABLET, EXTENDED RELEASE ORAL DAILY
Status: DISCONTINUED | OUTPATIENT
Start: 2024-12-14 | End: 2024-12-14

## 2024-12-13 RX ORDER — LEVOTHYROXINE SODIUM 88 UG/1
88 TABLET ORAL DAILY
Status: DISCONTINUED | OUTPATIENT
Start: 2024-12-14 | End: 2024-12-15 | Stop reason: HOSPADM

## 2024-12-13 RX ORDER — METOCLOPRAMIDE 10 MG/1
10 TABLET ORAL 3 TIMES DAILY PRN
Qty: 120 TABLET | Refills: 0 | Status: SHIPPED | OUTPATIENT
Start: 2024-12-13 | End: 2024-12-15

## 2024-12-13 RX ORDER — IBUPROFEN 400 MG/1
600 TABLET, FILM COATED ORAL ONCE
Status: COMPLETED | OUTPATIENT
Start: 2024-12-13 | End: 2024-12-13

## 2024-12-13 RX ORDER — SODIUM CHLORIDE 9 MG/ML
INJECTION, SOLUTION INTRAVENOUS PRN
Status: DISCONTINUED | OUTPATIENT
Start: 2024-12-13 | End: 2024-12-15 | Stop reason: HOSPADM

## 2024-12-13 RX ORDER — NIFEDIPINE 30 MG/1
30 TABLET, EXTENDED RELEASE ORAL ONCE
Status: COMPLETED | OUTPATIENT
Start: 2024-12-13 | End: 2024-12-13

## 2024-12-13 RX ORDER — DIPHENHYDRAMINE HCL 25 MG
25 CAPSULE ORAL EVERY 4 HOURS PRN
Qty: 30 CAPSULE | Refills: 0 | Status: SHIPPED | OUTPATIENT
Start: 2024-12-13 | End: 2024-12-15

## 2024-12-13 RX ORDER — SODIUM CHLORIDE 0.9 % (FLUSH) 0.9 %
5-40 SYRINGE (ML) INJECTION EVERY 12 HOURS SCHEDULED
Status: DISCONTINUED | OUTPATIENT
Start: 2024-12-13 | End: 2024-12-15 | Stop reason: HOSPADM

## 2024-12-13 RX ORDER — MAGNESIUM SULFATE IN WATER 40 MG/ML
2000 INJECTION, SOLUTION INTRAVENOUS ONCE
Status: DISCONTINUED | OUTPATIENT
Start: 2024-12-13 | End: 2024-12-13

## 2024-12-13 RX ORDER — ACETAMINOPHEN 500 MG
1000 TABLET ORAL ONCE
Status: COMPLETED | OUTPATIENT
Start: 2024-12-13 | End: 2024-12-13

## 2024-12-13 RX ORDER — NIFEDIPINE 30 MG/1
30 TABLET, EXTENDED RELEASE ORAL ONCE
Status: DISCONTINUED | OUTPATIENT
Start: 2024-12-13 | End: 2024-12-14

## 2024-12-13 RX ORDER — ACETAMINOPHEN 500 MG
1000 TABLET ORAL EVERY 6 HOURS PRN
Status: DISCONTINUED | OUTPATIENT
Start: 2024-12-13 | End: 2024-12-15 | Stop reason: HOSPADM

## 2024-12-13 RX ORDER — DIPHENHYDRAMINE HYDROCHLORIDE 50 MG/ML
25 INJECTION INTRAMUSCULAR; INTRAVENOUS ONCE
Status: COMPLETED | OUTPATIENT
Start: 2024-12-13 | End: 2024-12-13

## 2024-12-13 RX ORDER — SODIUM CHLORIDE 0.9 % (FLUSH) 0.9 %
5-40 SYRINGE (ML) INJECTION PRN
Status: DISCONTINUED | OUTPATIENT
Start: 2024-12-13 | End: 2024-12-15 | Stop reason: HOSPADM

## 2024-12-13 RX ORDER — SODIUM CHLORIDE, SODIUM LACTATE, POTASSIUM CHLORIDE, CALCIUM CHLORIDE 600; 310; 30; 20 MG/100ML; MG/100ML; MG/100ML; MG/100ML
INJECTION, SOLUTION INTRAVENOUS CONTINUOUS
Status: DISCONTINUED | OUTPATIENT
Start: 2024-12-13 | End: 2024-12-15 | Stop reason: HOSPADM

## 2024-12-13 RX ORDER — CALCIUM GLUCONATE 94 MG/ML
1000 INJECTION, SOLUTION INTRAVENOUS PRN
Status: DISCONTINUED | OUTPATIENT
Start: 2024-12-13 | End: 2024-12-15 | Stop reason: HOSPADM

## 2024-12-13 RX ORDER — METOCLOPRAMIDE HYDROCHLORIDE 5 MG/ML
10 INJECTION INTRAMUSCULAR; INTRAVENOUS ONCE
Status: COMPLETED | OUTPATIENT
Start: 2024-12-13 | End: 2024-12-13

## 2024-12-13 RX ADMIN — DIPHENHYDRAMINE HYDROCHLORIDE 25 MG: 50 INJECTION INTRAMUSCULAR; INTRAVENOUS at 12:32

## 2024-12-13 RX ADMIN — METOCLOPRAMIDE 10 MG: 5 INJECTION, SOLUTION INTRAMUSCULAR; INTRAVENOUS at 12:33

## 2024-12-13 RX ADMIN — NIFEDIPINE 30 MG: 30 TABLET, FILM COATED, EXTENDED RELEASE ORAL at 16:17

## 2024-12-13 RX ADMIN — HYDRALAZINE HYDROCHLORIDE 10 MG: 20 INJECTION INTRAMUSCULAR; INTRAVENOUS at 18:41

## 2024-12-13 RX ADMIN — IBUPROFEN 600 MG: 400 TABLET, FILM COATED ORAL at 18:41

## 2024-12-13 RX ADMIN — ACETAMINOPHEN 1000 MG: 500 TABLET ORAL at 18:41

## 2024-12-13 RX ADMIN — MAGNESIUM SULFATE HEPTAHYDRATE 2000 MG/HR: 40 INJECTION, SOLUTION INTRAVENOUS at 20:23

## 2024-12-13 RX ADMIN — SODIUM CHLORIDE, POTASSIUM CHLORIDE, SODIUM LACTATE AND CALCIUM CHLORIDE: 600; 310; 30; 20 INJECTION, SOLUTION INTRAVENOUS at 20:38

## 2024-12-13 RX ADMIN — MAGNESIUM SULFATE HEPTAHYDRATE 4000 MG: 40 INJECTION, SOLUTION INTRAVENOUS at 18:57

## 2024-12-13 RX ADMIN — POTASSIUM CHLORIDE 40 MEQ: 1500 TABLET, EXTENDED RELEASE ORAL at 15:03

## 2024-12-13 ASSESSMENT — PAIN SCALES - GENERAL: PAINLEVEL_OUTOF10: 6

## 2024-12-13 ASSESSMENT — PAIN - FUNCTIONAL ASSESSMENT: PAIN_FUNCTIONAL_ASSESSMENT: 0-10

## 2024-12-13 NOTE — DISCHARGE SUMMARY
OB/GYN Discharge Summary  [facility]      Patient Name: Magda Alegria  Patient : 1990  Primary Care Physician: Anupama Thurman DO  Admit Date: 2024    Principal Diagnosis: Severe gHTN PP    Other Diagnosis:   Pre-eclampsia, postpartum [O14.95]  Preeclampsia in postpartum period [O14.95]  Patient Active Problem List   Diagnosis    PCOS    Anxiety    GERD    Hypothyroid    Preeclampsia w/o SF's (G1)    Migraine without aura and without status migrainosus, not intractable    Vitamin D deficiency    Endometriosis    Dysmenorrhea    Hx CS x 2    RLTCS 24 M Apg 8/ Wt 8#2    Severe gHTN (new dx)    Pre-eclampsia, postpartum    Headache in pregnancy, postpartum       Infection: No  Hospital Acquired: n/a    Surgical Operations & Procedures: none    Consultations: none    Pertinent Findings & Procedures:   Magda Alegria is a 34 y.o. female , admitted for PP severe gestational HTN with severe range BP in ED.     Course of patient:   HD#1: Patient is POD#8 s/p RLTCS. Presented to ED with headache and elevated BP. CBC, CMP wnl. P/C tltc. BP requiring anti-hypertensives. Procardia 60 XL qD initiated.   HD#2: Repeat PreE labs wnl. Patient received 24 hours of magnesium sulfate. Procardia titrated to 90mg XL qd.    Discharge to: Home    Readmission planned: No    Recommendations on Discharge:     Medications:     Medication List        START taking these medications      diphenhydrAMINE 25 MG capsule  Commonly known as: BENADRYL  Take 1 capsule by mouth every 4 hours as needed for Itching (headache)     metoclopramide 10 MG tablet  Commonly known as: REGLAN  Take 1 tablet by mouth 3 times daily as needed (headache)     NIFEdipine 30 MG extended release tablet  Commonly known as: PROCARDIA XL  Take 1 tablet by mouth daily            CONTINUE taking these medications      acetaminophen 500 MG tablet  Commonly known as: TYLENOL  Take 2 tablets by mouth every 6 hours as needed for 
Male

## 2024-12-13 NOTE — PROGRESS NOTES
Patient is present for  bandage removal. Patient states pain 2/10. Patient shows no s/s of infection.  incision- intact, well approximated, clean no erythema increased warmth or drainage, normal induration. Incision cleaned with normal saline.    Patient was educated on incision care: cleaning area with warm soap and water daily, keeping area dry, not lifting more than 15 lbs, using pillows to prop baby when holding and/or nursing. Patient was instructed to call office with any questions.    Patient complains of headache past 2 days with no relief from Tylenol/Ibuprofen, dizziness, nausea. Per Dr. Mccray pt is to go to [facility] ER for evaluation. Pt expressed understanding and is going there straight from appointment.    2 week PP  JENNIFER  6 week PP  1/15 MYLES

## 2024-12-13 NOTE — ED NOTES
Pt sleeping on cot, rr even and non labored, no distress noted. Awaiting lab results and OB to see. Family remains at bedside.

## 2024-12-13 NOTE — H&P
hydralazine treatment.  Decision made to admit patient for severe gestational hypertension.    REVIEW OF SYSTEMS:   Constitutional: negative fever, negative chills, negative weight changes   HEENT: negative visual disturbances, positive headaches, negative dizziness, negative hearing loss  Breast: Negative breast abnormalities, negative breast lumps, negative nipple discharge  Respiratory: negative dyspnea, negative cough, negative SOB  Cardiovascular: negative chest pain,  negative palpitations, negative arrhythmia, negative syncope   Gastrointestinal: negative abdominal pain, negative RUQ pain, negative N/V, negative diarrhea, negative constipation, negative bowel changes, negative heartburn   Genitourinary: negative dysuria, negative hematuria, negative urinary incontinence, negative vaginal discharge, negative vaginal bleeding or spotting  Dermatological: negative rash, negative pruritis, negative mole or other skin changes  Hematologic: negative bruising  Immunologic/Lymphatic: negative recent illness, negative recent sick contact  Musculoskeletal: negative back pain, negative myalgias, negative arthralgias  Neurological:  negative dizziness, negative migraines, negative seizures, negative weakness  Behavior/Psych: negative depression, negative anxiety, negative SI, negative HI    _______________________________________________________________________    OBSTETRIC HISTORY:   OB History    Para Term  AB Living   2 2 2 0 0 2   SAB IAB Ectopic Molar Multiple Live Births   0 0 0 0 0 2      # Outcome Date GA Lbr Osiel/2nd Weight Sex Type Anes PTL Lv   2 Term 24 39w4d  3.69 kg (8 lb 2.2 oz) M CS-LTranv Spinal N CHANDANA      Name: Fahad Riddle      Apgar1: 8  Apgar5: 9   1 Term 22 39w6d  3.5 kg (7 lb 11.5 oz) F CS-LTranv EPI N CHANDANA      Complications: Failure to Progress in First Stage      Name: RIDDLE,BABY GIRL ERNI      Apgar1: 8  Apgar5: 9       PAST MEDICAL HISTORY:   has a past

## 2024-12-13 NOTE — CONSULTS
08/10/2022     Priority: Medium    Preeclampsia w/o SF's (G1) 07/22/2022     Priority: Medium    COVID-19 vaccine administered 04/22/2022     Priority: Medium    gHTN (new dx) 12/13/2024     Diagnosed PP     Procardia 30 XL initiated 12/13/24       Hx CS x 2 12/05/2024    RLTCS 12/5/24 M Apg 8/9 Wt 8#2 12/05/2024    Endometriosis 02/22/2024    Dysmenorrhea 02/22/2024    Hypothyroid 03/04/2022    Anxiety 05/02/2017     No meds      PCOS      Was previously on metformin      GERD 07/14/2011       Plan discussed with Dr. Gallegos, who is agreeable.     Attending's Name: Dr. Mahendra Pugh DO  Ob/Gyn Resident  [facility]  12/13/2024, 6:32 PM

## 2024-12-13 NOTE — DISCHARGE INSTRUCTIONS
You were seen in the ED for headaches.  You have been started on Procardia medication for management of blood pressure.    Please follow-up with OB/GYN in the outpatient setting as scheduled.    Please return to the ED if experience any abdominal pain, blurry vision, nausea/vomiting or no improvement in symptoms

## 2024-12-13 NOTE — ED NOTES
Pt tearful. Writer to bedside to d/c patient home. Pt BP is elevated. Pt had BP's more elevated than prior to medications. Pt also states she has some abdominal pain, pt states she is due for her tylenol and motrin for incisional pain. Resident notified. OB notified. Pt reports headache remains resolved.

## 2024-12-13 NOTE — ED NOTES
Pt to ed with family from home, sent by Dr. Mccray.   Pt has  on 24, discharged on 24. Pt states for the past 3 days she has a headache, no vision changes, not relieved with tylenol or motrin.   Pt had history of migraines prior, was taking Imitrex but unable to take with pregnancy. Pt also notes that she has been feeling dizzy, light headed the past several days. She states she is still able to function, no falls but feels dizzy even at rest.   Pt had a follow up OB appointment today with Dr. Mccray, she notified him of her symptoms and they sent her for further evaluation and work up.   Pt is alert, oriented, speaking in full, complete sentences, no distress noted at this time. Pt rates pain 8/10

## 2024-12-13 NOTE — ED PROVIDER NOTES
[facility] EMERGENCY DEPARTMENT  Emergency Department Encounter  Emergency Medicine Resident     Pt Name:Magda Alegria  MRN: [id number]  Birthdate 1990  Date of evaluation: 24  PCP:  Anupama Thurman DO  Note Started: 5:55 PM EST      CHIEF COMPLAINT       Chief Complaint   Patient presents with    Headache     Entered by patient    Dizziness    Hypertension     1.5 weeks post partum.  on 24       HISTORY OF PRESENT ILLNESS  (Location/Symptom, Timing/Onset, Context/Setting, Quality, Duration, Modifying Factors, Severity.)      Magda Alegria is a 34 y.o. female  Rh+ presenting to the ED with headache and dizziness.  Patient recently had a  on 2024 with a history of preeclampsia during her first pregnancy.  Patient today had her  follow-up appointment where she was noted to have a blood pressure of 140 x 90.  Patient was sent to the ED for further evaluation.  Patient states that the headache has been going on for few days has been taking Tylenol and ibuprofen without much symptomatic relief.  Patient denies any episodes of blurry vision, nausea.      PAST MEDICAL / SURGICAL / SOCIAL / FAMILY HISTORY      has a past medical history of Anemia, Hypothyroidism, Infertility, female, Migraines, PCOS (polycystic ovarian syndrome), and Pre-eclampsia.       has a past surgical history that includes Tonsillectomy (2013); [place] tooth extraction (2011);  section (N/A, 2022); and  section (N/A, 2024).      Social History     Socioeconomic History    Marital status:      Spouse name: Not on file    Number of children: Not on file    Years of education: Not on file    Highest education level: Not on file   Occupational History    Not on file   Tobacco Use    Smoking status: Never    Smokeless tobacco: Never   Vaping Use    Vaping status: Never Used   Substance and Sexual Activity    Alcohol use: Not Currently     Comment: occ,

## 2024-12-13 NOTE — PROGRESS NOTES
Obstetric/Gynecology Resident Interval Note    Met with patient at bedside and discussed new diagnosis of severe gestational hypertension. Discussed plan of care for severe gestational hypertension which includes admission and administration of magnesium sulfate. Patient verbalized understanding and reassurance given.     Discussed that her baby can stay with her as long as she has another adult with her overnight. Patient and her partner agreed that they would work on organizing childcare for their other child at home.     All questions were answered. Admit orders placed. Magnesium sulfate ordered to start in the ED.    Skyla Balbuena MD  OB/GYN Resident, PGY2  [place], [place]  12/13/2024, 6:56 PM

## 2024-12-13 NOTE — ED PROVIDER NOTES
[facility]     Emergency Department     Faculty Attestation    I performed a history and physical examination of the patient and discussed management with the resident. I reviewed the resident’s note and agree with the documented findings and plan of care. Any areas of disagreement are noted on the chart. I was personally present for the key portions of any procedures. I have documented in the chart those procedures where I was not present during the key portions. I have reviewed the emergency nurses triage note. I agree with the chief complaint, past medical history, past surgical history, allergies, medications, social and family history as documented unless otherwise noted below. Documentation of the HPI, Physical Exam and Medical Decision Making performed by medical students or scribes is based on my personal performance of the HPI, PE and MDM. For Physician Assistant/ Nurse Practitioner cases/documentation I have personally evaluated this patient and have completed at least one if not all key elements of the E/M (history, physical exam, and MDM). Additional findings are as noted.    Vital signs:   Vitals:    24 1208   BP: 137/82   Pulse: 74   Resp: 18   Temp: 98.7 °F (37.1 °C)   SpO2: 100%      34-year-old female here with a headache. She had a  on . She was found to have hypertension when she was in her OB office today and was referred to the ED for evaluation. She denies vision changes. No nausea or vomiting. She has bilateral lower extremity swelling which she states is improving. Lochia decreasing. She also reports dizziness associated with her headache, which feels like vertigo. The patient states her headache is not positional in nature. She has had preeclampsia in the past.  No neurologic deficits on exam.  Plan for a workup for preeclampsia.  We will discuss with OB/GYN            Nupur Garcia M.D,  Attending Emergency  Physician           Nupur Garcia

## 2024-12-13 NOTE — ED PROVIDER NOTES
[facility]  FACULTY HANDOFF       Handoff taken on the following patient from prior Attending Physician:  Pt Name: Magda Alegria  PCP:  Anupama Thurman DO    Attestation  I was available and discussed any additional care issues that arose and coordinated the management plans with the resident(s) caring for the patient during my duty period. Any areas of disagreement with resident's documentation of care or procedures are noted on the chart. I was personally present for the key portions of any/all procedures during my duty period. I have documented in the chart those procedures where I was not present during the key portions.         CHIEF COMPLAINT       Chief Complaint   Patient presents with    Headache     Entered by patient    Dizziness    Hypertension     1.5 weeks post partum.  on 24         CURRENT MEDICATIONS     Previous Medications  Current Discharge Medication List        CONTINUE these medications which have NOT CHANGED    Details   ibuprofen (ADVIL;MOTRIN) 600 MG tablet Take 1 tablet by mouth every 6 hours as needed for Pain  Qty: 40 tablet, Refills: 0      acetaminophen (TYLENOL) 500 MG tablet Take 2 tablets by mouth every 6 hours as needed for Pain  Qty: 30 tablet, Refills: 1      levothyroxine (SYNTHROID) 88 MCG tablet TAKE 1 TABLET BY MOUTH IN THE MORNING IMMEDIATELY UPON ARISING, DELAY EATING/DRINKING FOR 30 MINUTES  Qty: 90 tablet, Refills: 3    Associated Diagnoses: Other specified hypothyroidism      !! sennosides-docusate sodium (SENOKOT-S) 8.6-50 MG tablet Take 1 tablet by mouth daily  Qty: 30 tablet, Refills: 1      simethicone (MYLICON) 80 MG chewable tablet Take 1 tablet by mouth 4 times daily as needed for Flatulence (gas pain)  Qty: 60 tablet, Refills: 1      ondansetron (ZOFRAN-ODT) 4 MG disintegrating tablet Take 1 tablet by mouth 3 times daily as needed for Nausea or Vomiting  Qty: 21 tablet, Refills: 0      !! sennosides-docusate sodium

## 2024-12-14 PROBLEM — R51.9 HEADACHE IN PREGNANCY, POSTPARTUM: Status: ACTIVE | Noted: 2024-12-14

## 2024-12-14 LAB
ALBUMIN SERPL-MCNC: 3.8 G/DL (ref 3.5–5.2)
ALBUMIN/GLOB SERPL: 1.3 {RATIO} (ref 1–2.5)
ALP SERPL-CCNC: 99 U/L (ref 35–104)
ALT SERPL-CCNC: 19 U/L (ref 10–35)
ANION GAP SERPL CALCULATED.3IONS-SCNC: 12 MMOL/L (ref 9–16)
AST SERPL-CCNC: 20 U/L (ref 10–35)
BASOPHILS # BLD: 0.03 K/UL (ref 0–0.2)
BASOPHILS NFR BLD: 0 % (ref 0–2)
BILIRUB SERPL-MCNC: 0.3 MG/DL (ref 0–1.2)
BUN SERPL-MCNC: 7 MG/DL (ref 6–20)
CALCIUM SERPL-MCNC: 7.6 MG/DL (ref 8.6–10.4)
CHLORIDE SERPL-SCNC: 106 MMOL/L (ref 98–107)
CO2 SERPL-SCNC: 20 MMOL/L (ref 20–31)
CREAT SERPL-MCNC: 0.6 MG/DL (ref 0.6–0.9)
EOSINOPHIL # BLD: 0.09 K/UL (ref 0–0.44)
EOSINOPHILS RELATIVE PERCENT: 1 % (ref 1–4)
ERYTHROCYTE [DISTWIDTH] IN BLOOD BY AUTOMATED COUNT: 14 % (ref 11.8–14.4)
GFR, ESTIMATED: >90 ML/MIN/1.73M2
GLUCOSE SERPL-MCNC: 89 MG/DL (ref 74–99)
HCT VFR BLD AUTO: 40.4 % (ref 36.3–47.1)
HGB BLD-MCNC: 13 G/DL (ref 11.9–15.1)
IMM GRANULOCYTES # BLD AUTO: <0.03 K/UL (ref 0–0.3)
IMM GRANULOCYTES NFR BLD: 0 %
LYMPHOCYTES NFR BLD: 1.62 K/UL (ref 1.1–3.7)
LYMPHOCYTES RELATIVE PERCENT: 21 % (ref 24–43)
MCH RBC QN AUTO: 27.5 PG (ref 25.2–33.5)
MCHC RBC AUTO-ENTMCNC: 32.2 G/DL (ref 28.4–34.8)
MCV RBC AUTO: 85.6 FL (ref 82.6–102.9)
MONOCYTES NFR BLD: 0.56 K/UL (ref 0.1–1.2)
MONOCYTES NFR BLD: 7 % (ref 3–12)
NEUTROPHILS NFR BLD: 71 % (ref 36–65)
NEUTS SEG NFR BLD: 5.55 K/UL (ref 1.5–8.1)
NRBC BLD-RTO: 0 PER 100 WBC
PLATELET # BLD AUTO: 370 K/UL (ref 138–453)
PMV BLD AUTO: 9.2 FL (ref 8.1–13.5)
POTASSIUM SERPL-SCNC: 3.8 MMOL/L (ref 3.7–5.3)
PROT SERPL-MCNC: 6.8 G/DL (ref 6.6–8.7)
RBC # BLD AUTO: 4.72 M/UL (ref 3.95–5.11)
SODIUM SERPL-SCNC: 138 MMOL/L (ref 136–145)
WBC OTHER # BLD: 7.9 K/UL (ref 3.5–11.3)

## 2024-12-14 PROCEDURE — 36415 COLL VENOUS BLD VENIPUNCTURE: CPT

## 2024-12-14 PROCEDURE — 6370000000 HC RX 637 (ALT 250 FOR IP)

## 2024-12-14 PROCEDURE — 99222 1ST HOSP IP/OBS MODERATE 55: CPT | Performed by: STUDENT IN AN ORGANIZED HEALTH CARE EDUCATION/TRAINING PROGRAM

## 2024-12-14 PROCEDURE — 85025 COMPLETE CBC W/AUTO DIFF WBC: CPT

## 2024-12-14 PROCEDURE — 1220000000 HC SEMI PRIVATE OB R&B

## 2024-12-14 PROCEDURE — 6360000002 HC RX W HCPCS

## 2024-12-14 PROCEDURE — 2580000003 HC RX 258

## 2024-12-14 PROCEDURE — 80053 COMPREHEN METABOLIC PANEL: CPT

## 2024-12-14 RX ORDER — POLYETHYLENE GLYCOL 3350 17 G/17G
17 POWDER, FOR SOLUTION ORAL DAILY
Status: DISCONTINUED | OUTPATIENT
Start: 2024-12-14 | End: 2024-12-15 | Stop reason: HOSPADM

## 2024-12-14 RX ORDER — SENNOSIDES A AND B 8.6 MG/1
1 TABLET, FILM COATED ORAL NIGHTLY
Status: DISCONTINUED | OUTPATIENT
Start: 2024-12-14 | End: 2024-12-15 | Stop reason: HOSPADM

## 2024-12-14 RX ORDER — NIFEDIPINE 30 MG/1
60 TABLET, EXTENDED RELEASE ORAL DAILY
Status: DISCONTINUED | OUTPATIENT
Start: 2024-12-14 | End: 2024-12-15

## 2024-12-14 RX ORDER — POLYETHYLENE GLYCOL 3350 17 G/17G
17 POWDER, FOR SOLUTION ORAL DAILY
Status: DISCONTINUED | OUTPATIENT
Start: 2024-12-15 | End: 2024-12-14

## 2024-12-14 RX ADMIN — LEVOTHYROXINE SODIUM 88 MCG: 88 TABLET ORAL at 06:58

## 2024-12-14 RX ADMIN — SODIUM CHLORIDE, POTASSIUM CHLORIDE, SODIUM LACTATE AND CALCIUM CHLORIDE: 600; 310; 30; 20 INJECTION, SOLUTION INTRAVENOUS at 09:03

## 2024-12-14 RX ADMIN — IBUPROFEN 600 MG: 600 TABLET, FILM COATED ORAL at 14:21

## 2024-12-14 RX ADMIN — IBUPROFEN 600 MG: 600 TABLET, FILM COATED ORAL at 20:57

## 2024-12-14 RX ADMIN — ACETAMINOPHEN 1000 MG: 500 TABLET ORAL at 01:04

## 2024-12-14 RX ADMIN — IBUPROFEN 600 MG: 600 TABLET, FILM COATED ORAL at 01:10

## 2024-12-14 RX ADMIN — MAGNESIUM SULFATE HEPTAHYDRATE 2000 MG/HR: 40 INJECTION, SOLUTION INTRAVENOUS at 06:58

## 2024-12-14 RX ADMIN — IBUPROFEN 600 MG: 600 TABLET, FILM COATED ORAL at 07:22

## 2024-12-14 RX ADMIN — MAGNESIUM SULFATE HEPTAHYDRATE 2000 MG/HR: 40 INJECTION, SOLUTION INTRAVENOUS at 16:19

## 2024-12-14 RX ADMIN — ACETAMINOPHEN 1000 MG: 500 TABLET ORAL at 07:23

## 2024-12-14 RX ADMIN — NIFEDIPINE 60 MG: 30 TABLET, FILM COATED, EXTENDED RELEASE ORAL at 07:22

## 2024-12-14 RX ADMIN — ACETAMINOPHEN 1000 MG: 500 TABLET ORAL at 20:57

## 2024-12-14 RX ADMIN — POLYETHYLENE GLYCOL 3350 17 G: 17 POWDER, FOR SOLUTION ORAL at 22:47

## 2024-12-14 RX ADMIN — ACETAMINOPHEN 1000 MG: 500 TABLET ORAL at 14:21

## 2024-12-14 ASSESSMENT — PAIN SCALES - GENERAL
PAINLEVEL_OUTOF10: 2
PAINLEVEL_OUTOF10: 6
PAINLEVEL_OUTOF10: 2
PAINLEVEL_OUTOF10: 4
PAINLEVEL_OUTOF10: 6

## 2024-12-14 ASSESSMENT — PAIN DESCRIPTION - LOCATION
LOCATION: HEAD
LOCATION: HEAD

## 2024-12-14 NOTE — PROGRESS NOTES
POST PARTUM DAY # 9     Magda Alegria is a 29 y.o. female  This patient was seen & examined today.     Her pregnancy was complicated by:   Patient Active Problem List   Diagnosis    Iron deficiency anemia due to chronic blood loss    Acne vulgaris    Menorrhagia with regular cycle    Breast pain in female    Elevated 1 hr, normal 3 hr GTT    Need for diphtheria-tetanus-pertussis (Tdap) vaccine    Needs flu shot    High-risk pregnancy, unspecified trimester     23 M Apg  Wt 6#5     PreE with SF (G1)       Today she is doing well without any chief complaint. Her lochia is light. She denies chest pain, shortness of breath, headache, lightheadedness, blurred vision, and peripheral edema. She is  breast feeding and she denies any breast tenderness. She is ambulating well. Her voiding pattern is normal. I reviewed signs and symptoms of post partum depression with the patient, she currently denies any of these symptoms. She is tolerating solids.     Vital Signs:  Vitals:    24 0000 24 0100 24 0200 24 0300   BP: 139/82 (!) 150/87 (!) 151/83 138/78   Pulse: 82 85 80 65   Resp: 18 18 18 16   Temp:  97.5 °F (36.4 °C)     TempSrc:  Oral     SpO2: 98% 99% 96% 92%   Weight:       Height:               Physical Exam:  General:  no apparent distress, alert, and cooperative  Neurologic:  alert, oriented, normal speech, no focal findings or movement disorder noted  Lungs:  No increased work of breathing, good air exchange, clear to auscultation bilaterally, no crackles or wheezing  Heart:  regular rate and rhythm    Abdomen: abdomen soft, non-distended, non-tender  Fundus: non-tender, normal size, firm, below umbilicus  Incision: clean/dry/intact   Extremities: DTR normal Bilateral upper extremities Right: 2/4   Left: 2/4  Clonus: absent      Urine Output: 200ml/hr; Clear urine    Labs:  Last Magnesium Level:   Lab Results   Component Value Date/Time    MG 5.9 2023 01:00 PM       BMP:

## 2024-12-14 NOTE — PROGRESS NOTES
Resident Interval Magnesium Sulfate Note    Magda Alegria is a 34 y.o. female  POD# 8 s/p RLTCS w/ PP Severe gHTN (new dx)  The patient is resting comfortably. She denies headache, visual changes, and abdominal pain in the right upper quadrant. She denies any shortness of breath or chest pain. She denies change in her extremities, regarding swelling.    Continuous Medications:    lactated ringers 75 mL/hr at 24    sodium chloride      magnesium sulfate 2,000 mg/hr (24)       Vitals:    Vitals:    24 2100 240 24   BP:  138/80 (!) 156/110 139/79   Pulse:  68 80 70   Resp:  18 18    Temp:       TempSrc:       SpO2:  97%     Weight: 132.9 kg (293 lb)      Height: 1.676 m (5' 6\")            Physical Exam:  Chest: clear to auscultation bilaterally  Heart: RRR no murmur  Abdomen: soft, nontender, nondistended  Extremities: DTR normal Bilateral upper extremities Right: 2/4   Left: 2/4  Clonus: absent    Urine Output: 59.3ml/hr; Clear urine    Labs:  Last Magnesium Level:   No results found for: \"MG\"    BMP:    Recent Labs     24  1235      K 3.5*   *   CO2 24   BUN 10   CREATININE 0.7   GLUCOSE 91       ASSESSMENT/PLAN  Magda Alegria is a 34 y.o. female  POD# 5 s/p RLTCS with PP Severe gHTN (new dx)   - Continue Magnesium Sulfate Treatment 2g/hr, off @  on 24   - BPs normotensive currently    - Patient denies any s/s PreE   - UOP adequate    - PreE labs wnl, P/C tltc   - Currently controlled on Procardia 30 XL qD   - Last IV anti-hypertensive Hydralazine 10 mg IV     Payton Pugh DO  Ob/Gyn Resident  2024, 11:03 PM

## 2024-12-14 NOTE — PROGRESS NOTES
Resident Interval Magnesium Sulfate Note    Magda Alegria is a 34 y.o. female  POD# 9 s/p RLTCS w/ PP Severe gHTN   The patient is resting comfortably. She denies headache, visual changes, and abdominal pain. She denies any shortness of breath or chest pain. She denies change in her extremities, regarding swelling.    Continuous Medications:    lactated ringers 75 mL/hr at 24 0903    sodium chloride      magnesium sulfate 2,000 mg/hr (24 0658)       Vitals:    Vitals:    24 0600 24 0700 24 0800 24 0900   BP: (!) 142/87 128/81 138/73 137/81   Pulse: 77 87 68 85   Resp: 16 16 16 16   Temp:   97.7 °F (36.5 °C)    TempSrc:   Oral    SpO2: 98% 95% 95% 100%   Weight:       Height:            Physical Exam:  Chest: clear to auscultation bilaterally  Heart: RRR no murmur  Abdomen: soft, nontender, nondistended  Extremities: DTR bilateral lower extremities Right: 2/4   Left: 2/4  Clonus: absent    Urine Output: 425/hr in 6 hrs; Clear urine    Labs:  Last Magnesium Level:   No results found for: \"MG\"    BMP:    Recent Labs     24  1235 24  0707    138   K 3.5* 3.8   * 106   CO2 24 20   BUN 10 7   CREATININE 0.7 0.6   GLUCOSE 91 89       ASSESSMENT/PLAN  Magda Alegria is a 34 y.o. female  POD# 9 s/p RLTCS with PP Severe gHTN    - Continue Magnesium Sulfate Treatment 2g/hr, off @ 18:57 on 2024   - No Mag levels q6hrs per provider   - BPs normotensive   - Patient denies any s/s PreE   - UOP adequate   - PreE labs Wnl, P/C CNBC   - Currently controlled on Procardia 30 XL QID   - Last IV anti-hypertensive Hydralazine 10 mg IV on 18:57   - Continue to monitor     Minoo Beard MD  Family Medicine  Resident  2024, 10:57 AM

## 2024-12-14 NOTE — PROGRESS NOTES
Resident Interval Magnesium Sulfate Note    Magda Alegria is a 34 y.o. female  POD# 9 s/p RLTCS w/ PP Severe gHTN (new dx)  The patient is resting comfortably. She denies headache, visual changes, and abdominal pain in the right upper quadrant. She denies any shortness of breath or chest pain. She denies change in her extremities, regarding swelling.    Continuous Medications:    lactated ringers 75 mL/hr at 24    sodium chloride      magnesium sulfate 2,000 mg/hr (24 0658)       Vitals:    Vitals:    24 0300 24 0400 24 0500 24 0600   BP: 138/78 (!) 149/94 (!) 152/97 (!) 142/87   Pulse: 65 90 69 77   Resp: 16 16 16 16   Temp:       TempSrc:       SpO2: 92% 98% 95% 98%   Weight:       Height:             Physical Exam:  Chest: clear to auscultation bilaterally  Heart: RRR no murmur  Abdomen: soft, nontender, nondistended  Extremities: DTR normal Bilateral upper extremities Right: 2/4   Left: 2/4  Clonus: absent    Urine Output: 59.3ml/hr; Clear urine    Labs:  Last Magnesium Level:   No results found for: \"MG\"    BMP:    Recent Labs     24  1235      K 3.5*   *   CO2 24   BUN 10   CREATININE 0.7   GLUCOSE 91       ASSESSMENT/PLAN  Magda Alegria is a 34 y.o. female  POD# 9 s/p RLTCS with PP Severe gHTN (new dx)   - Continue Magnesium Sulfate Treatment 2g/hr, off @  on 24   - BPs normotensive currently    - Patient denies any s/s PreE   - UOP adequate    - PreE labs wnl, P/C tltc   - Currently controlled on Procardia 30 XL qD   - Last IV anti-hypertensive Hydralazine 10 mg IV     Payton Pugh DO  Ob/Gyn Resident  2024, 7:04 AM

## 2024-12-14 NOTE — CARE COORDINATION
CASE MANAGEMENT TRANSITIONAL CARE PLAN    Pre-eclampsia, postpartum [O14.95]  Preeclampsia in postpartum period [O14.95]    OB Provider: Dr. Mccray    Writer met w/ Magda at her bedside to discuss DCP. She is S/P CS on 24 of male . Magda was readmitted for postpartum pre-eclampsia.    Writer verified address/phone number correct on facesheet. She states she lives with her  and now 2 kids. Magda denied barriers with transportation home, to doctor's appointments or with paying for medications upon discharge home.     [employer] insurance correct.     DME: [facility]  HOME CARE: None      [facility] RISK OF UNPLANNED READMISSION 2.0             10.5 Total Score

## 2024-12-14 NOTE — PROGRESS NOTES
Resident Interval Magnesium Sulfate Note    Magda Alegria is a 34 y.o. female  POD# 9 s/p RLTCS w/ PP Severe gHTN   The patient is resting comfortably. She endorses mild headache which getting better with Motrin. Denies visual changes, and abdominal pain. She denies any shortness of breath or chest pain. She denies change in her extremities, regarding swelling.    Continuous Medications:    lactated ringers 75 mL/hr at 24 0903    sodium chloride      magnesium sulfate 2,000 mg/hr (24 0658)       Vitals:    Vitals:    24 1000 24 1100 24 1200 24 1300   BP: 139/82 (!) 144/82 134/79 (!) 145/88   Pulse: 91 87 91 91   Resp: 18 16 18    Temp:       TempSrc:       SpO2: 98% 95% 98% 98%   Weight:       Height:            Physical Exam:  Chest: clear to auscultation bilaterally  Heart: RRR no murmur  Abdomen: soft, nontender, nondistended  Extremities: DTR bilateral lower extremities Right: 2/4   Left: 2/4  Clonus: absent    Urine Output: 425/hr in 6 hrs; Clear urine    Labs:  Last Magnesium Level:   No results found for: \"MG\"    BMP:    Recent Labs     24  1235 24  0707    138   K 3.5* 3.8   * 106   CO2 24 20   BUN 10 7   CREATININE 0.7 0.6   GLUCOSE 91 89       ASSESSMENT/PLAN  Magda Alegria is a 34 y.o. female  POD# 9 s/p RLTCS with PP Severe gHTN               - Continue Magnesium Sulfate Treatment 2g/hr, off @ 18:57 on 2024              - No Mag levels q6hrs per provider              - BPs normotensive              - Patient denies any s/s PreE              - UOP adequate              - PreE labs Wnl, P/C CNBC              - Currently controlled on Procardia 30 XL QID              - Last IV anti-hypertensive Hydralazine 10 mg IV on 18:57              - Continue to monitor     Minoo Beard MD  Family Medicine Resident  2024, 3:07 PM

## 2024-12-14 NOTE — ED NOTES
Report given to Layne LOREDO&RAMSES LAM. All questions answered.  No change in patient status  Continues to rest quietly

## 2024-12-14 NOTE — CARE COORDINATION
12/14/24 1100   Readmission Assessment   Number of Days since last admission? 1-7 days   Previous Disposition Home with Family   Who is being Interviewed Patient   What was the patient's/caregiver's perception as to why they think they needed to return back to the hospital? Other (Comment)  (Change in patient condition. Elevated BP)   Did you visit your Primary Care Physician after you left the hospital, before you returned this time? No   Why weren't you able to visit your PCP? Did not have an appointment  (Follows closely with OB.)   Did you see a specialist, such as Cardiac, Pulmonary, Orthopedic Physician, etc. after you left the hospital? Yes   Who advised the patient to return to the hospital? Physician   Does the patient report anything that got in the way of taking their medications? No   In our efforts to provide the best possible care to you and others like you, can you think of anything that we could have done to help you after you left the hospital the first time, so that you might not have needed to return so soon? Other (Comment)  (N/A)

## 2024-12-15 VITALS
HEIGHT: 66 IN | HEART RATE: 91 BPM | TEMPERATURE: 98.1 F | DIASTOLIC BLOOD PRESSURE: 84 MMHG | BODY MASS INDEX: 47.09 KG/M2 | SYSTOLIC BLOOD PRESSURE: 140 MMHG | OXYGEN SATURATION: 98 % | RESPIRATION RATE: 16 BRPM | WEIGHT: 293 LBS

## 2024-12-15 PROCEDURE — 6370000000 HC RX 637 (ALT 250 FOR IP)

## 2024-12-15 PROCEDURE — 6360000002 HC RX W HCPCS

## 2024-12-15 PROCEDURE — 99232 SBSQ HOSP IP/OBS MODERATE 35: CPT | Performed by: STUDENT IN AN ORGANIZED HEALTH CARE EDUCATION/TRAINING PROGRAM

## 2024-12-15 RX ORDER — NIFEDIPINE 30 MG/1
90 TABLET, EXTENDED RELEASE ORAL DAILY
Status: DISCONTINUED | OUTPATIENT
Start: 2024-12-15 | End: 2024-12-15 | Stop reason: HOSPADM

## 2024-12-15 RX ORDER — METOCLOPRAMIDE 10 MG/1
10 TABLET ORAL 3 TIMES DAILY PRN
Qty: 120 TABLET | Refills: 0 | Status: SHIPPED | OUTPATIENT
Start: 2024-12-15

## 2024-12-15 RX ORDER — ONDANSETRON 4 MG/1
4 TABLET, ORALLY DISINTEGRATING ORAL EVERY 8 HOURS PRN
Status: DISCONTINUED | OUTPATIENT
Start: 2024-12-15 | End: 2024-12-15 | Stop reason: HOSPADM

## 2024-12-15 RX ORDER — NIFEDIPINE 30 MG/1
30 TABLET, EXTENDED RELEASE ORAL DAILY
Qty: 90 TABLET | Refills: 1 | Status: SHIPPED | OUTPATIENT
Start: 2024-12-15 | End: 2024-12-15

## 2024-12-15 RX ORDER — NIFEDIPINE 30 MG/1
30 TABLET, EXTENDED RELEASE ORAL ONCE
Status: COMPLETED | OUTPATIENT
Start: 2024-12-15 | End: 2024-12-15

## 2024-12-15 RX ORDER — DIPHENHYDRAMINE HCL 25 MG
25 CAPSULE ORAL EVERY 4 HOURS PRN
Qty: 30 CAPSULE | Refills: 0 | Status: SHIPPED | OUTPATIENT
Start: 2024-12-15 | End: 2024-12-25

## 2024-12-15 RX ORDER — NIFEDIPINE 30 MG/1
90 TABLET, EXTENDED RELEASE ORAL DAILY
Qty: 90 TABLET | Refills: 1 | Status: SHIPPED | OUTPATIENT
Start: 2024-12-15

## 2024-12-15 RX ORDER — METOCLOPRAMIDE HYDROCHLORIDE 5 MG/ML
10 INJECTION INTRAMUSCULAR; INTRAVENOUS ONCE
Status: COMPLETED | OUTPATIENT
Start: 2024-12-15 | End: 2024-12-15

## 2024-12-15 RX ORDER — DIPHENHYDRAMINE HYDROCHLORIDE 50 MG/ML
25 INJECTION INTRAMUSCULAR; INTRAVENOUS ONCE
Status: COMPLETED | OUTPATIENT
Start: 2024-12-15 | End: 2024-12-15

## 2024-12-15 RX ADMIN — ACETAMINOPHEN 1000 MG: 500 TABLET ORAL at 09:22

## 2024-12-15 RX ADMIN — LEVOTHYROXINE SODIUM 88 MCG: 88 TABLET ORAL at 07:19

## 2024-12-15 RX ADMIN — DIPHENHYDRAMINE HYDROCHLORIDE 25 MG: 50 INJECTION INTRAMUSCULAR; INTRAVENOUS at 04:24

## 2024-12-15 RX ADMIN — IBUPROFEN 600 MG: 600 TABLET, FILM COATED ORAL at 09:22

## 2024-12-15 RX ADMIN — METOCLOPRAMIDE 10 MG: 5 INJECTION, SOLUTION INTRAMUSCULAR; INTRAVENOUS at 04:24

## 2024-12-15 RX ADMIN — IBUPROFEN 600 MG: 600 TABLET, FILM COATED ORAL at 02:44

## 2024-12-15 RX ADMIN — NIFEDIPINE 90 MG: 30 TABLET, FILM COATED, EXTENDED RELEASE ORAL at 09:22

## 2024-12-15 RX ADMIN — NIFEDIPINE 30 MG: 30 TABLET, FILM COATED, EXTENDED RELEASE ORAL at 02:44

## 2024-12-15 RX ADMIN — ONDANSETRON 4 MG: 4 TABLET, ORALLY DISINTEGRATING ORAL at 04:24

## 2024-12-15 RX ADMIN — ACETAMINOPHEN 1000 MG: 500 TABLET ORAL at 02:45

## 2024-12-15 ASSESSMENT — PAIN DESCRIPTION - LOCATION: LOCATION: HEAD

## 2024-12-15 ASSESSMENT — PAIN DESCRIPTION - DESCRIPTORS: DESCRIPTORS: ACHING

## 2024-12-15 ASSESSMENT — PAIN SCALES - GENERAL: PAINLEVEL_OUTOF10: 3

## 2024-12-15 NOTE — FLOWSHEET NOTE
Pt called out reporting headache, her heart pumping hard, and nausea. Vitals charted. Dr. Allen notified. Primary RN notified.

## 2024-12-15 NOTE — PROGRESS NOTES
CLINICAL PHARMACY NOTE: MEDS TO BEDS    Total # of Prescriptions Filled: 3   The following medications were delivered to the patient:  Banophen 25mg tabs  Nifedipine er osm 90mg tabs  Metoclopramide 10mg tabs    Additional Documentation:  Delivered to pt + 1 in room 734 on 12/15 at 2:20P. Copay was $12.71 paid with [employer]

## 2024-12-15 NOTE — FLOWSHEET NOTE
Discharge instructions given, all questions answered.  I have reviewed all [facility] Post-Birth Warning Signs and essential teaching points for pulmonary embolism, cardiac disease, hypertensive disorders of pregnancy, obstetric hemorrhage, venous thromboembolism, infection, and postpartum depression with the patient and Phil (support person) . I have informed the patient on when to call their healthcare provider and when to call 911. I have discussed with the patient  the importance of scheduling a follow-up visit with their physician, nurse practitioner or midwife and provided them with correct contact information for appointment. I have provided the patient with a copy of the \"Save Your Life\" handout. The patient has acknowledged receiving and understanding this education with her signature.

## 2024-12-15 NOTE — PROGRESS NOTES
POST OPERATIVE DAY # 10    Magda Alegria is a 34 y.o. female   This patient was seen and examined today. S/p RLTCS 12/5/24    Her pregnancy was complicated by:   Patient Active Problem List   Diagnosis    PCOS    Anxiety    GERD    Hypothyroid    Preeclampsia w/o SF's (G1)    Migraine without aura and without status migrainosus, not intractable    Vitamin D deficiency    Endometriosis    Dysmenorrhea    Hx CS x 2    RLTCS 12/5/24 M Apg 8/9 Wt 8#2    Severe gHTN (new dx)    Pre-eclampsia, postpartum    Headache in pregnancy, postpartum       Patient called out with worsened headache, palpitations and nausea that she attributes to the Procardia dose that she received at 0245 today. States that her headache has intermittently been present and has waxed and waned all day.  There was a period in which her headache was relieved completely but it has come back and is stronger now. Her lochia is light. She denies chest pain, shortness of breath, and blurred vision. She is breast feeding and she denies any signs or symptoms of mastitis.  She is ambulating well. She is voiding without difficulty. She currently denies S/S of postpartum depression. Flatus and bowel movement present. She is tolerating solids.    Vital Signs:  Vitals:    12/14/24 1800 12/14/24 1938 12/15/24 0000 12/15/24 0401   BP: (!) 142/83 (!) 143/94 (!) 134/93 139/78   Pulse: 87 89 81 (!) 116   Resp: 18 16 16 19   Temp:  97.7 °F (36.5 °C)     TempSrc:  Oral     SpO2: 97% 98%  97%   Weight:       Height:           Urine Input & Output last 24hrs:     Intake/Output Summary (Last 24 hours) at 12/15/2024 0445  Last data filed at 12/14/2024 1859  Gross per 24 hour   Intake 2709.37 ml   Output 2850 ml   Net -140.63 ml       Physical Exam:  General:  no apparent distress, alert, and cooperative  Neurologic:  alert, oriented, normal speech, no focal findings or movement disorder noted  Lungs:  no increased work of breathing, no conversational dyspnea  Heart:

## 2024-12-15 NOTE — FLOWSHEET NOTE
Patient admitted to room 734 from L&D via wheelchair. Oriented to room and surroundings. Plan of care reviewed.  Verbalized understanding.  Instructed on rounding and visiting policy. Call light placed within reach.

## 2024-12-16 ENCOUNTER — TELEPHONE (OUTPATIENT)
Dept: OBGYN CLINIC | Age: 34
End: 2024-12-16

## 2024-12-16 NOTE — TELEPHONE ENCOUNTER
PT WAS DISCHARGE ON 12/15 DUE TO PRE ECLAMPSIA- SHE HAS FOLLOW UP APPT ON 12/19 BUT HOSPITAL TOLD HER TO CALL TO MAKE SURE DOES NOT NEED TO BE SEEN SOONER-     PT DISCHARGE SAYS 1 WK FOLLOW UP    TOLD PT SHOULD BE OK TO KEEP APPT AS SCHEDULED- TO KEEP TRACK AT BP AT HOME AND ANY CONCERNS CALL US PRIOR TO APPT.

## 2024-12-17 ENCOUNTER — NURSE ONLY (OUTPATIENT)
Dept: OBGYN CLINIC | Age: 34
End: 2024-12-17
Payer: COMMERCIAL

## 2024-12-17 ENCOUNTER — TELEPHONE (OUTPATIENT)
Dept: OBGYN CLINIC | Age: 34
End: 2024-12-17

## 2024-12-17 VITALS
BODY MASS INDEX: 45.26 KG/M2 | HEIGHT: 66 IN | SYSTOLIC BLOOD PRESSURE: 130 MMHG | DIASTOLIC BLOOD PRESSURE: 64 MMHG | HEART RATE: 85 BPM | WEIGHT: 281.6 LBS

## 2024-12-17 DIAGNOSIS — Z01.30 BP CHECK: Primary | ICD-10-CM

## 2024-12-17 PROCEDURE — 99211 OFF/OP EST MAY X REQ PHY/QHP: CPT | Performed by: NURSE PRACTITIONER

## 2024-12-17 NOTE — PROGRESS NOTES
Patient presented for BP check- /64  HR 85.  she is  taking procardia 90mg as directed.     Per Negra pt is to monitor and to call office with any elevated BPs, increase fluids, eat protein, call office/go to ER for evaluation if light headed, dizzy, nausea/vomiting, swelling, vision changes, SOB. Patient expressed understanding.    2 week PP 12/19 Negra

## 2024-12-18 ENCOUNTER — TELEPHONE (OUTPATIENT)
Dept: PRIMARY CARE CLINIC | Age: 34
End: 2024-12-18

## 2024-12-18 NOTE — TELEPHONE ENCOUNTER
Care Transitions Initial Follow Up Call    Outreach made within 2 business days of discharge: Yes    Patient: Magda Alegria Patient : 1990   MRN: [id number]  Reason for Admission: Pre-eclampsia, post partum  Discharge Date: 12/15/24       Spoke with: patient, Magda    Discharge department/facility: [facility]    Patient is following with her OBGYN for hospital follow up.      Celestina Bo MA

## 2024-12-19 ENCOUNTER — POSTPARTUM VISIT (OUTPATIENT)
Dept: OBGYN CLINIC | Age: 34
End: 2024-12-19

## 2024-12-19 VITALS
WEIGHT: 280 LBS | SYSTOLIC BLOOD PRESSURE: 108 MMHG | HEIGHT: 66 IN | DIASTOLIC BLOOD PRESSURE: 72 MMHG | BODY MASS INDEX: 45 KG/M2

## 2024-12-19 PROCEDURE — 99024 POSTOP FOLLOW-UP VISIT: CPT | Performed by: NURSE PRACTITIONER

## 2024-12-19 ASSESSMENT — ENCOUNTER SYMPTOMS
CONSTIPATION: 0
DIARRHEA: 0
SHORTNESS OF BREATH: 0
NAUSEA: 0
COUGH: 0
VOMITING: 0

## 2024-12-19 ASSESSMENT — ANXIETY QUESTIONNAIRES
4. TROUBLE RELAXING: SEVERAL DAYS
6. BECOMING EASILY ANNOYED OR IRRITABLE: SEVERAL DAYS
2. NOT BEING ABLE TO STOP OR CONTROL WORRYING: SEVERAL DAYS
IF YOU CHECKED OFF ANY PROBLEMS ON THIS QUESTIONNAIRE, HOW DIFFICULT HAVE THESE PROBLEMS MADE IT FOR YOU TO DO YOUR WORK, TAKE CARE OF THINGS AT HOME, OR GET ALONG WITH OTHER PEOPLE: NOT DIFFICULT AT ALL
5. BEING SO RESTLESS THAT IT IS HARD TO SIT STILL: NOT AT ALL
1. FEELING NERVOUS, ANXIOUS, OR ON EDGE: SEVERAL DAYS
7. FEELING AFRAID AS IF SOMETHING AWFUL MIGHT HAPPEN: NOT AT ALL
3. WORRYING TOO MUCH ABOUT DIFFERENT THINGS: SEVERAL DAYS
GAD7 TOTAL SCORE: 5

## 2024-12-19 NOTE — PROGRESS NOTES
[facility] [address]  [address]  [facility] [address]  Dept: [phone]  Dept Fax: [phone]    Magda Alegria is a 34 y.o. female who presents today for her medical conditions/complaintsas noted below.  Magda Alegria is c/o of Postpartum Care        HPI:     Magda presents for 2 week postpartum check up. Delivered   on 24 No complaints, of chest pain, S.O.B.  no abdominal pain, GI or  issues.  Breastfeeding No Signs mastitis No  Bleeding Yes light lochia  Birth control options  considering vasectomy- discussed IUD possibly too     The patient completed :   E.P.D.S. Evaluation form and scored 7.   WILL 7 and scored 5.  Denies any  suicidal/homicidal ideations or plans or delusions or hallucinations.        Last HGB:13 on 12/15/24  GDM:no  gHTN or Preeclampsia: Dx PP preeclampsia 24 admitted and on procardia 90mg daily.  She is checking her blood pressure at home she has been having headaches but relates similar to migraines now she if she takes her migraine medicine it seems to help she states.  Blood pressure within normal limits today.     OB History    Para Term  AB Living   2 2 2 0 0 2   SAB IAB Ectopic Molar Multiple Live Births   0 0 0   0 2      # Outcome Date GA Lbr Osiel/2nd Weight Sex Type Anes PTL Lv   2 Term 24 39w4d  3.69 kg (8 lb 2.2 oz) M CS-LTranv Spinal N CHANDANA   1 Term 22 39w6d  3.5 kg (7 lb 11.5 oz) F CS-LTranv EPI N CHANDANA      Complications: Failure to Progress in First Stage       Past Medical History:   Diagnosis Date    Anemia     Hypothyroidism 2020    Infertility, female     Migraines 2017    PCOS (polycystic ovarian syndrome)     Pre-eclampsia 2022    While in labor      Past Surgical History:   Procedure Laterality Date     SECTION N/A 2022     SECTION performed by Jerrell Mccray DO at [facility] L&D OR     SECTION N/A

## 2024-12-19 NOTE — PATIENT INSTRUCTIONS
condition or this instruction, always ask your healthcare professional. [employer], [facility] disclaims any warranty or liability for your use of this information.            Mastitis: Care Instructions  Your Care Instructions  Mastitis is an inflammation of the breast. It occurs most often in women who are breastfeeding, but it can affect any woman. Mastitis can be caused by poor milk flow from the breast. When milk builds up in a breast, it leaks into the nearby breast tissue. Infection can also develop when the nipples become cracked or irritated. The tissue can then become infected with bacteria.  Antibiotics can usually cure mastitis. For women who are nursing, continued breastfeeding (or pumping) can help. If mastitis is not treated, a pocket of pus may form in the breast and need to be drained.  Follow-up care is a key part of your treatment and safety. Be sure to make and go to all appointments, and call your doctor if you are having problems. It's also a good idea to know your test results and keep a list of the medicines you take.  How can you care for yourself at home?  If your doctor prescribed antibiotics, take them as directed. Do not stop taking them just because you feel better. You need to take the full course of antibiotics.  If you are breastfeeding, continue breastfeeding or pumping breast milk. It is important to empty your breasts regularly, every 2 to 3 hours while you are awake. These tips may help:  Before breastfeeding, place a warm, wet washcloth over your breast for about 15 minutes. Try this at least 3 times a day. This increases milk flow in the breast. Massaging the affected breast may also increase milk flow.  Breastfeed on both sides. Try to start with your healthy breast first. Then, after your milk is flowing, breastfeed from the affected breast until it feels soft. You should empty this breast completely. Then switch back to the healthy breast and breastfeed until your baby has

## 2025-01-11 ASSESSMENT — PATIENT HEALTH QUESTIONNAIRE - PHQ9
SUM OF ALL RESPONSES TO PHQ QUESTIONS 1-9: 0
SUM OF ALL RESPONSES TO PHQ9 QUESTIONS 1 & 2: 0
SUM OF ALL RESPONSES TO PHQ QUESTIONS 1-9: 0
1. LITTLE INTEREST OR PLEASURE IN DOING THINGS: NOT AT ALL
1. LITTLE INTEREST OR PLEASURE IN DOING THINGS: NOT AT ALL
SUM OF ALL RESPONSES TO PHQ QUESTIONS 1-9: 0
2. FEELING DOWN, DEPRESSED OR HOPELESS: NOT AT ALL
SUM OF ALL RESPONSES TO PHQ9 QUESTIONS 1 & 2: 0
SUM OF ALL RESPONSES TO PHQ QUESTIONS 1-9: 0
2. FEELING DOWN, DEPRESSED OR HOPELESS: NOT AT ALL

## 2025-01-12 ENCOUNTER — HOSPITAL ENCOUNTER (EMERGENCY)
Age: 35
Discharge: HOME | End: 2025-01-12
Attending: STUDENT IN AN ORGANIZED HEALTH CARE EDUCATION/TRAINING PROGRAM
Payer: COMMERCIAL

## 2025-01-12 VITALS
RESPIRATION RATE: 18 BRPM | OXYGEN SATURATION: 98 % | HEART RATE: 86 BPM | BODY MASS INDEX: 45 KG/M2 | HEIGHT: 66 IN | WEIGHT: 280 LBS | SYSTOLIC BLOOD PRESSURE: 127 MMHG | TEMPERATURE: 98 F | DIASTOLIC BLOOD PRESSURE: 86 MMHG

## 2025-01-12 DIAGNOSIS — R20.2 TINGLING: Primary | ICD-10-CM

## 2025-01-12 PROCEDURE — 99282 EMERGENCY DEPT VISIT SF MDM: CPT | Performed by: STUDENT IN AN ORGANIZED HEALTH CARE EDUCATION/TRAINING PROGRAM

## 2025-01-12 ASSESSMENT — PAIN - FUNCTIONAL ASSESSMENT: PAIN_FUNCTIONAL_ASSESSMENT: NONE - DENIES PAIN

## 2025-01-12 NOTE — DISCHARGE INSTRUCTIONS
We evaluated you for your symptoms.  We discussed you may have early carpal tunnel on the right hand, try anti-inflammatories and wear the brace.    Additionally we believe you may have some nerve pain from the healing nerves from the .    Continue following closely with your primary doctor as well as your OB/GYN.    Return to the emergency department you develop any worsening or concerning symptoms.

## 2025-01-12 NOTE — ED PROVIDER NOTES
She is not in acute distress.     Appearance: Normal appearance.   HENT:      Head: Normocephalic and atraumatic.      Right Ear: External ear normal.      Left Ear: External ear normal.      Nose: Nose normal.      Mouth/Throat:      Mouth: Mucous membranes are moist.   Eyes:      General:         Right eye: No discharge.         Left eye: No discharge.   Cardiovascular:      Rate and Rhythm: Normal rate and regular rhythm.      Pulses: Normal pulses.      Comments: Bilateral radial pulses 2+  Pulmonary:      Effort: Pulmonary effort is normal.   Abdominal:      General: There is no distension.      Palpations: Abdomen is soft.      Tenderness: There is no abdominal tenderness.      Comments: Well-healed surgical incision over lower abdomen, nontender   Musculoskeletal:      Cervical back: Normal range of motion.      Comments: Moving all 4 extremities   Skin:     General: Skin is warm.      Capillary Refill: Capillary refill takes less than 2 seconds.   Neurological:      General: No focal deficit present.      Mental Status: She is alert and oriented to person, place, and time.      Cranial Nerves: No cranial nerve deficit.      Comments: Strength 5 out of 5 in bilateral upper and lower extremities.  Sensation intact throughout   Psychiatric:         Mood and Affect: Mood normal.         DIFFERENTIAL DIAGNOSIS/MDM:   Vitals:    Vitals:    25 1345   BP: 127/86   Pulse: 86   Resp: 18   Temp: 98 °F (36.7 °C)   TempSrc: Oral   SpO2: 98%   Weight: 127 kg (280 lb)   Height: 1.676 m (5' 6\")       Medical Decision Making  DDx: Nerve pain from recent , carpal tunnel    34-year-old female presents with tingling around the recent  incision that she feels like is radiating into bilateral lower extremities.  Ongoing for the past few days.   was 6 weeks ago.  It was her second .  Patient is concerned she may have a blood clot.  Additionally patient states that her right hand has been

## 2025-01-13 ENCOUNTER — OFFICE VISIT (OUTPATIENT)
Dept: PRIMARY CARE CLINIC | Age: 35
End: 2025-01-13
Payer: COMMERCIAL

## 2025-01-13 VITALS
SYSTOLIC BLOOD PRESSURE: 110 MMHG | HEART RATE: 97 BPM | TEMPERATURE: 98.2 F | WEIGHT: 279.2 LBS | BODY MASS INDEX: 45.06 KG/M2 | OXYGEN SATURATION: 95 % | DIASTOLIC BLOOD PRESSURE: 70 MMHG

## 2025-01-13 DIAGNOSIS — J01.90 ACUTE NON-RECURRENT SINUSITIS, UNSPECIFIED LOCATION: ICD-10-CM

## 2025-01-13 DIAGNOSIS — E03.8 OTHER SPECIFIED HYPOTHYROIDISM: Primary | ICD-10-CM

## 2025-01-13 DIAGNOSIS — G56.01 CARPAL TUNNEL SYNDROME OF RIGHT WRIST: ICD-10-CM

## 2025-01-13 DIAGNOSIS — E28.2 PCOS (POLYCYSTIC OVARIAN SYNDROME): ICD-10-CM

## 2025-01-13 DIAGNOSIS — R73.01 IMPAIRED FASTING BLOOD SUGAR: ICD-10-CM

## 2025-01-13 PROCEDURE — 99214 OFFICE O/P EST MOD 30 MIN: CPT | Performed by: FAMILY MEDICINE

## 2025-01-13 RX ORDER — LEVOTHYROXINE SODIUM 88 UG/1
TABLET ORAL
Qty: 90 TABLET | Refills: 3 | Status: SHIPPED | OUTPATIENT
Start: 2025-01-13

## 2025-01-13 ASSESSMENT — ENCOUNTER SYMPTOMS
SINUS PRESSURE: 1
VOMITING: 0

## 2025-01-13 NOTE — PROGRESS NOTES
[facility]  [facility]  [address] [place] [address] DR  [address]  [facility] [address]  Dept: [phone]  Dept Fax: [phone]    Magda Alegria is a 34 y.o. female who presents today for her medical conditions/complaints as noted below.  Magda Alegria is c/o of  Chief Complaint   Patient presents with    Follow-up     On medications, discuss re starting Metformin    Sinus Problem     Congested, post sinus drip, sinuses \"pulsating\"         HPI:     HPI    Pt here for follow up on chronic conditions.    Was on metformin in the past for pcos.  Pt is 6 weeks post partum. Did not have any issues with diabetes during pregnancy.  Did have post partum pre-eclampsia.     Was having numbness/tingling in her R arm yesterday - noted to likely be carpal tunnel when she was seen in the ER.     Has been congested for past week with sinus pressure.    Hemoglobin A1C (%)   Date Value   2023 5.0   08/10/2022 5.2   2021 5.5             ( goal A1C is < 7)   No components found for: \"LABMICR\"  No components found for: \"LDLCHOLESTEROL\", \"LDLCALC\"    (goal LDL is <100)   AST (U/L)   Date Value   2024 20     ALT (U/L)   Date Value   2024 19     BUN (mg/dL)   Date Value   2024 7     BP Readings from Last 3 Encounters:   25 110/70   25 127/86   24 108/72          (goal 120/80)    Past Medical History:   Diagnosis Date    Anemia     Anxiety     Hypothyroidism 2020    Infertility, female     Migraines 2017    Obesity     PCOS (polycystic ovarian syndrome)     Pre-eclampsia 2022    While in labor      Past Surgical History:   Procedure Laterality Date     SECTION N/A 2022     SECTION performed by Jerrell Mccray DO at [facility] L&D OR     SECTION N/A 2024     SECTION performed by Jerrell Mccray DO at [facility] L&D OR    TONSILLECTOMY  2013    WISDOM TOOTH EXTRACTION  2011       Family History   Problem Relation Age

## 2025-01-15 ENCOUNTER — POSTPARTUM VISIT (OUTPATIENT)
Dept: OBGYN CLINIC | Age: 35
End: 2025-01-15

## 2025-01-15 VITALS
SYSTOLIC BLOOD PRESSURE: 118 MMHG | BODY MASS INDEX: 44.52 KG/M2 | DIASTOLIC BLOOD PRESSURE: 72 MMHG | WEIGHT: 277 LBS | HEIGHT: 66 IN

## 2025-01-15 DIAGNOSIS — E03.9 HYPOTHYROIDISM, UNSPECIFIED TYPE: ICD-10-CM

## 2025-01-15 DIAGNOSIS — O71.00: ICD-10-CM

## 2025-01-15 PROCEDURE — 0503F POSTPARTUM CARE VISIT: CPT | Performed by: OBSTETRICS & GYNECOLOGY

## 2025-01-15 RX ORDER — NORETHINDRONE ACETATE AND ETHINYL ESTRADIOL 1MG-20(21)
1 KIT ORAL DAILY
Qty: 1 PACKET | Refills: 3 | Status: SHIPPED | OUTPATIENT
Start: 2025-01-15

## 2025-01-15 ASSESSMENT — ANXIETY QUESTIONNAIRES
GAD7 TOTAL SCORE: 10
3. WORRYING TOO MUCH ABOUT DIFFERENT THINGS: MORE THAN HALF THE DAYS
1. FEELING NERVOUS, ANXIOUS, OR ON EDGE: SEVERAL DAYS
2. NOT BEING ABLE TO STOP OR CONTROL WORRYING: MORE THAN HALF THE DAYS
7. FEELING AFRAID AS IF SOMETHING AWFUL MIGHT HAPPEN: MORE THAN HALF THE DAYS
4. TROUBLE RELAXING: SEVERAL DAYS
6. BECOMING EASILY ANNOYED OR IRRITABLE: MORE THAN HALF THE DAYS
5. BEING SO RESTLESS THAT IT IS HARD TO SIT STILL: NOT AT ALL

## 2025-01-15 NOTE — PROGRESS NOTES
Magda Alegria  11:27 AM  1/15/25    The patient was seen. She has no chief complaints today. She delivered by  section on 2024. She is not breast feeding and there is not any signs or symptoms of mastitis.  The patient completed the E.P.D.S. Evaluation form and scored 9.  She does not have any signs or symptoms of post partum depression. She denies any suicidal thoughts with a plan, intent to harm others, and delusional ideas. Today her lochia is light she denies any dizziness or shortness of breath.      Her pregnancy was complicated by:   Patient Active Problem List    Diagnosis Date Noted    Migraine without aura and without status migrainosus, not intractable 08/10/2022     Priority: Medium    Vitamin D deficiency 08/10/2022     Priority: Medium    Preeclampsia w/o SF's (G1) 2022     Priority: Medium    Dehiscence of old uterine scar with extension before onset of labor, antepartum 01/15/2025    Headache in pregnancy, postpartum 2024    Severe gHTN (new dx) 2024     Overview Note:     Diagnosed PP   Mg x24h  Procardia 60 XL initiated 24       Pre-eclampsia, postpartum 2024    Hx CS x 2 2024    RLTCS 24 M Apg 8/9 Wt 8#2 2024    Endometriosis 2024    Dysmenorrhea 2024    Hypothyroid 2022    Anxiety 2017     Overview Note:     No meds      PCOS      Overview Note:     Was previously on metformin      GERD 2011         She does admit to having good home support. Her bowels are regular and she denies any urinary tract symptomology.    OB History    Para Term  AB Living   2 2 2 0 0 2   SAB IAB Ectopic Molar Multiple Live Births   0 0 0 0 0 2           Blood pressure 118/72, height 1.676 m (5' 6\"), weight 125.6 kg (277 lb), last menstrual period 2024, not currently breastfeeding.    Abdomen: Soft and non-tender; good bowel sounds; no guarding, rebound or rigidity; no CVA tenderness

## 2025-01-27 ENCOUNTER — PATIENT MESSAGE (OUTPATIENT)
Dept: PRIMARY CARE CLINIC | Age: 35
End: 2025-01-27

## 2025-01-27 RX ORDER — IBUPROFEN 800 MG/1
800 TABLET, FILM COATED ORAL EVERY 8 HOURS PRN
Qty: 30 TABLET | Refills: 0 | Status: SHIPPED | OUTPATIENT
Start: 2025-01-27

## 2025-01-27 NOTE — TELEPHONE ENCOUNTER
Didn't see 800 just 600, please advise      Last Visit Date: 1/13/2025   Next Visit Date: 7/14/2025

## 2025-02-05 ENCOUNTER — HOSPITAL ENCOUNTER (OUTPATIENT)
Age: 35
Setting detail: SPECIMEN
Discharge: HOME | End: 2025-02-05

## 2025-02-05 DIAGNOSIS — R73.01 IMPAIRED FASTING BLOOD SUGAR: ICD-10-CM

## 2025-02-05 DIAGNOSIS — E03.9 HYPOTHYROIDISM, UNSPECIFIED TYPE: ICD-10-CM

## 2025-02-05 LAB
EST. AVERAGE GLUCOSE BLD GHB EST-MCNC: 100 MG/DL
HBA1C MFR BLD: 5.1 % (ref 4–6)
TSH SERPL DL<=0.05 MIU/L-ACNC: 0.85 UIU/ML (ref 0.27–4.2)

## 2025-03-02 RX ORDER — GABAPENTIN 300 MG/1
300 CAPSULE ORAL 3 TIMES DAILY
Qty: 30 CAPSULE | Refills: 0 | Status: SHIPPED | OUTPATIENT
Start: 2025-03-02 | End: 2025-03-12

## 2025-04-01 RX ORDER — IBUPROFEN 800 MG/1
800 TABLET, FILM COATED ORAL EVERY 8 HOURS PRN
Qty: 30 TABLET | Refills: 0 | Status: SHIPPED | OUTPATIENT
Start: 2025-04-01

## 2025-04-04 ENCOUNTER — E-VISIT (OUTPATIENT)
Dept: PRIMARY CARE CLINIC | Age: 35
End: 2025-04-04
Payer: COMMERCIAL

## 2025-04-04 ENCOUNTER — TELEPHONE (OUTPATIENT)
Dept: PRIMARY CARE CLINIC | Age: 35
End: 2025-04-04

## 2025-04-04 DIAGNOSIS — M54.50 ACUTE LOW BACK PAIN, UNSPECIFIED BACK PAIN LATERALITY, UNSPECIFIED WHETHER SCIATICA PRESENT: Primary | ICD-10-CM

## 2025-04-04 PROCEDURE — 99422 OL DIG E/M SVC 11-20 MIN: CPT | Performed by: NURSE PRACTITIONER

## 2025-04-04 RX ORDER — METHYLPREDNISOLONE 4 MG/1
TABLET ORAL
Qty: 1 KIT | Refills: 0 | Status: SHIPPED | OUTPATIENT
Start: 2025-04-04 | End: 2025-04-10

## 2025-04-04 NOTE — PROGRESS NOTES
Pt called into office stating she has not gotten her medications yet.    Please can provider send in the medications.     Thanks

## 2025-04-04 NOTE — TELEPHONE ENCOUNTER
Patient called stating she hurt her back while playing with her children yesterday. Patient would like to know if something can be sent to help her lower back pain.      Please advise

## 2025-04-04 NOTE — PROGRESS NOTES
Reviewed questionnaire    Reviewed meds/allergies    Dx Back pain    Plan Rx given for zanaflex and medrol dose pack, follow up in office if no improvement    Time spent on visit 11 min

## 2025-05-07 ENCOUNTER — OFFICE VISIT (OUTPATIENT)
Dept: OBGYN CLINIC | Age: 35
End: 2025-05-07
Payer: COMMERCIAL

## 2025-05-07 VITALS — BODY MASS INDEX: 46.32 KG/M2 | DIASTOLIC BLOOD PRESSURE: 62 MMHG | SYSTOLIC BLOOD PRESSURE: 122 MMHG | WEIGHT: 287 LBS

## 2025-05-07 DIAGNOSIS — E28.2 PCOS (POLYCYSTIC OVARIAN SYNDROME): ICD-10-CM

## 2025-05-07 DIAGNOSIS — L68.0 FEMALE HIRSUTISM: ICD-10-CM

## 2025-05-07 DIAGNOSIS — N94.6 DYSMENORRHEA: ICD-10-CM

## 2025-05-07 DIAGNOSIS — Z01.419 WELL WOMAN EXAM WITH ROUTINE GYNECOLOGICAL EXAM: Primary | ICD-10-CM

## 2025-05-07 PROBLEM — R51.9 HEADACHE IN PREGNANCY, POSTPARTUM: Status: RESOLVED | Noted: 2024-12-14 | Resolved: 2025-05-07

## 2025-05-07 PROBLEM — O14.90 PREECLAMPSIA: Status: RESOLVED | Noted: 2022-07-22 | Resolved: 2025-05-07

## 2025-05-07 PROCEDURE — 99395 PREV VISIT EST AGE 18-39: CPT | Performed by: STUDENT IN AN ORGANIZED HEALTH CARE EDUCATION/TRAINING PROGRAM

## 2025-05-07 PROCEDURE — 99213 OFFICE O/P EST LOW 20 MIN: CPT | Performed by: STUDENT IN AN ORGANIZED HEALTH CARE EDUCATION/TRAINING PROGRAM

## 2025-05-07 NOTE — PROGRESS NOTES
[facility] Annual Visit    Magda Alegria  2025                       Primary Care Physician: Anupama Thurman DO    CC:   Chief Complaint   Patient presents with    Annual Exam         HPI: Magda Alegria is a 35 y.o. female     The patient was seen and examined. Her Patient's last menstrual period was 2025 (approximate)..   She is here for an annual visit.     She reports was on COCP but had breakthrough bleeding and worsening cramps.     Menarche: 15  Menopause: N/A   Periods are regular and last 4-5 days.   Heavy bleeding: No  Dysmenorrhea: Yes  Intermenstrual bleeding: No    Bowel habits are regular.   Denies any bloating.    Denies dysuria.   denies urinary leaking.    denies vaginal discharge.    is sexually active: male partner   uses condoms for contraception and is not desiring pregnancy.    Gynecologic History:  -STD History: no past history  -Pap History: Pap 2023, 2022 NILM HPV neg  -History high grade pap? no  -Colposcopy History: denies  -Permanent Sterilization: no  -Hormone Replacement Exposure: no    Health Maintenance:  [facility] immunization: discussed  Date of Last Mammogram: N/A  Date of Last Colonoscopy: N/A  Date of Last Bone Density: N/A    Review of Symptoms:   -Constitutional: (-) fever, (-) chills  -HEENT: (-) visual disturbances, (-)headaches  -Respiratory: (-) dyspnea, (-) cough  -Cardiovascular: (-) chest pain, (-) palpitations  -Gastrointestinal: (-) abdominal pain, (-) N/V, (-) diarrhea, (-) constipation  -Genitourinary: (-) vaginal discharge  -Hematologic: (-) bruising  -Immunologic/Lymphatic: (-) recent illness, (-) recent sick contact      OBSTETRICAL HISTORY:  OB History    Para Term  AB Living   2 2 2 0 0 2   SAB IAB Ectopic Molar Multiple Live Births   0 0 0 0 0 2      # Outcome Date GA Lbr Osiel/2nd Weight Sex Type Anes PTL Lv   2 Term 24 39w4d  3.69 kg (8 lb 2.2 oz) M CS-LTranv Spinal N CHANDANA      Name: Fahad Alegria

## 2025-05-11 SDOH — HEALTH STABILITY: PHYSICAL HEALTH: ON AVERAGE, HOW MANY MINUTES DO YOU ENGAGE IN EXERCISE AT THIS LEVEL?: 20 MIN

## 2025-05-11 SDOH — HEALTH STABILITY: PHYSICAL HEALTH: ON AVERAGE, HOW MANY DAYS PER WEEK DO YOU ENGAGE IN MODERATE TO STRENUOUS EXERCISE (LIKE A BRISK WALK)?: 2 DAYS

## 2025-05-12 ENCOUNTER — HOSPITAL ENCOUNTER (OUTPATIENT)
Age: 35
Setting detail: SPECIMEN
Discharge: HOME | End: 2025-05-12

## 2025-05-12 DIAGNOSIS — E28.2 PCOS (POLYCYSTIC OVARIAN SYNDROME): ICD-10-CM

## 2025-05-12 LAB
GLUCOSE P FAST SERPL-MCNC: 84 MG/DL (ref 74–99)
INSULIN REFERENCE RANGE:: NORMAL
INSULIN: 19.6 MU/L

## 2025-05-13 ENCOUNTER — RESULTS FOLLOW-UP (OUTPATIENT)
Dept: OBGYN CLINIC | Age: 35
End: 2025-05-13

## 2025-05-14 ENCOUNTER — OFFICE VISIT (OUTPATIENT)
Age: 35
End: 2025-05-14
Payer: COMMERCIAL

## 2025-05-14 VITALS
SYSTOLIC BLOOD PRESSURE: 134 MMHG | BODY MASS INDEX: 46.12 KG/M2 | HEIGHT: 66 IN | HEART RATE: 88 BPM | DIASTOLIC BLOOD PRESSURE: 80 MMHG | WEIGHT: 287 LBS | OXYGEN SATURATION: 97 %

## 2025-05-14 DIAGNOSIS — Z86.79 HISTORY OF POSTPARTUM PRE-ECLAMPSIA: ICD-10-CM

## 2025-05-14 DIAGNOSIS — R00.2 PALPITATIONS: ICD-10-CM

## 2025-05-14 DIAGNOSIS — E03.9 HYPOTHYROIDISM, UNSPECIFIED TYPE: ICD-10-CM

## 2025-05-14 DIAGNOSIS — Z00.00 WELL ADULT EXAM: Primary | ICD-10-CM

## 2025-05-14 DIAGNOSIS — E55.9 VITAMIN D DEFICIENCY: ICD-10-CM

## 2025-05-14 DIAGNOSIS — E28.2 PCOS (POLYCYSTIC OVARIAN SYNDROME): ICD-10-CM

## 2025-05-14 DIAGNOSIS — Z87.59 HISTORY OF POSTPARTUM PRE-ECLAMPSIA: ICD-10-CM

## 2025-05-14 PROCEDURE — 99213 OFFICE O/P EST LOW 20 MIN: CPT | Performed by: STUDENT IN AN ORGANIZED HEALTH CARE EDUCATION/TRAINING PROGRAM

## 2025-05-14 PROCEDURE — 99395 PREV VISIT EST AGE 18-39: CPT | Performed by: STUDENT IN AN ORGANIZED HEALTH CARE EDUCATION/TRAINING PROGRAM

## 2025-05-14 NOTE — PROGRESS NOTES
Magda Alegria (:  1990) is a 35 y.o. female, Established patient, here for evaluation of the following chief complaint(s):  New Patient, Headache, and Dizziness         Assessment & Plan  1. Hypothyroidism.  - Currently on Synthroid for hypothyroidism.  - A new thyroid test will be ordered to monitor her levels.  - Previous thyroid test was done in 2024.  - Blood pressure today is within normal range.    2. Polycystic Ovary Syndrome (PCOS).  - History of PCOS with symptoms including difficulty losing weight.  - Expressed interest in resuming metformin; prescription sent to pharmacy.  - Referral to endocrinology made for further management.  - Follow-up appointment with OB scheduled in the next couple of weeks.    3. Lightheadedness.  - Lightheadedness appears related to headaches and hydration.  - Advised to maintain adequate hydration and monitor symptoms.  - If headaches persist beyond 2 weeks, further evaluation including MRI may be necessary.  - History of migraines noted.    4. Palpitations.  - Reported intermittent palpitations, not experienced in the last 3 to 4 weeks.  - Echocardiogram and Holter monitor ordered by cardiologist.  - No carotid scan necessary due to young age.  - EKG already performed; no additional EKG needed today.    5. Blood pressure management.  - Blood pressure readings within normal range during this visit.  - Advised to purchase a home blood pressure monitor with appropriately sized cuff.  - Maintain a log of readings for future reference.  - Previous high readings at home potentially due to incorrect cuff size.    6. Wellness check.  - Received all vaccines during pregnancy and had HIV screening done.  - Basic labs ordered to check iron and vitamin D levels.  - Chronic problems well controlled and addressed by other specialists.    Follow-up  - Follow-up in 2 months or as needed based on symptoms and test results.    Warning signs discussed but when to call us or

## 2025-05-14 NOTE — PATIENT INSTRUCTIONS
Magda    Thank you for choosing [facility].  We know you have options when it comes to your healthcare; we appreciate that you chose us. Our goal is to provide exceptional  service and world class care to every patient.  You will be receiving a survey via email or text message asking for your feedback.  Please take a few minutes to share your thoughts about your recent visit. Your comments help us understand what we do well and ways we can improve.  Thank you in advance for your valuable feedback.      Dr. Ever PONCE MA

## 2025-05-15 ENCOUNTER — TELEPHONE (OUTPATIENT)
Age: 35
End: 2025-05-15

## 2025-05-15 NOTE — TELEPHONE ENCOUNTER
We received the referral back from Dr. Michelle Chatterjee with a note saying \"this dx needs to go to gynecology first\".I placed it in your slot. What would you like to do? I see she does see a gynecologist. Please advise

## 2025-05-16 NOTE — TELEPHONE ENCOUNTER
Ok to advise her to continue with GYN first and ask the Gyn if she thinks she should see endo too. Ok to cancel the referral for now thanks

## 2025-05-21 LAB
ALBUMIN: 4.2 G/DL
ALP BLD-CCNC: 70 U/L
ALT SERPL-CCNC: 18 U/L
ANION GAP SERPL CALCULATED.3IONS-SCNC: 8 MMOL/L
AST SERPL-CCNC: 18 U/L
BASOPHILS ABSOLUTE: 0 /ΜL
BASOPHILS RELATIVE PERCENT: 0.4 %
BILIRUB SERPL-MCNC: 0.6 MG/DL (ref 0.1–1.4)
BUN BLDV-MCNC: 11 MG/DL
CALCIUM SERPL-MCNC: 9 MG/DL
CHLORIDE BLD-SCNC: 106 MMOL/L
CHOLESTEROL, TOTAL: 265 MG/DL
CHOLESTEROL/HDL RATIO: 5.1
CO2: 22 MMOL/L
CREAT SERPL-MCNC: 0.7 MG/DL
CREATININE URINE: 74.2 MG/DL
EOSINOPHILS ABSOLUTE: 0.1 /ΜL
EOSINOPHILS RELATIVE PERCENT: 0.9 %
FERRITIN: 11.9 NG/ML (ref 9–150)
GFR, ESTIMATED: >60
GLUCOSE BLD-MCNC: 95 MG/DL
HCT VFR BLD CALC: 42.2 % (ref 36–46)
HDLC SERPL-MCNC: 52 MG/DL (ref 35–70)
HEMOGLOBIN: 14.2 G/DL (ref 12–16)
LDL CHOLESTEROL: 178
LYMPHOCYTES ABSOLUTE: 2.9 /ΜL
LYMPHOCYTES RELATIVE PERCENT: 30.3 %
MAGNESIUM: 1.7 MG/DL
MCH RBC QN AUTO: 28.5 PG
MCHC RBC AUTO-ENTMCNC: 33.6 G/DL
MCV RBC AUTO: 84.7 FL
MICROALBUMIN/CREAT 24H UR: 30.7 MG/DL
MICROALBUMIN/CREAT UR-RTO: 41.4 MG/G
MONOCYTES ABSOLUTE: 0.6 /ΜL
MONOCYTES RELATIVE PERCENT: 6.3 %
NEUTROPHILS ABSOLUTE: 5.9 /ΜL
NEUTROPHILS RELATIVE PERCENT: 62 %
NONHDLC SERPL-MCNC: 213 MG/DL
PDW BLD-RTO: 12.7 %
PLATELET # BLD: 359 K/ΜL
PMV BLD AUTO: 9.2 FL
POTASSIUM SERPL-SCNC: 4.6 MMOL/L
RBC # BLD: 4.98 10^6/ΜL
SODIUM BLD-SCNC: 136 MMOL/L
T4 FREE: 1.44
TOTAL PROTEIN: 7.5 G/DL (ref 6.4–8.2)
TRIGL SERPL-MCNC: 174 MG/DL
TSH SERPL DL<=0.05 MIU/L-ACNC: 0.91 UIU/ML
VITAMIN B-12: 580
VITAMIN D 25-HYDROXY: 26.9
VITAMIN D2, 25 HYDROXY: NORMAL
VITAMIN D3,25 HYDROXY: NORMAL
VLDLC SERPL CALC-MCNC: 35 MG/DL
WBC # BLD: 9.6 10^3/ML

## 2025-05-22 ENCOUNTER — RESULTS FOLLOW-UP (OUTPATIENT)
Age: 35
End: 2025-05-22

## 2025-05-22 DIAGNOSIS — E55.9 VITAMIN D DEFICIENCY: ICD-10-CM

## 2025-05-22 DIAGNOSIS — E78.5 DYSLIPIDEMIA: ICD-10-CM

## 2025-05-22 DIAGNOSIS — Z00.00 WELL ADULT EXAM: ICD-10-CM

## 2025-05-22 DIAGNOSIS — R00.2 PALPITATIONS: ICD-10-CM

## 2025-05-22 DIAGNOSIS — E28.2 PCOS (POLYCYSTIC OVARIAN SYNDROME): Primary | ICD-10-CM

## 2025-05-22 DIAGNOSIS — E03.9 HYPOTHYROIDISM, UNSPECIFIED TYPE: ICD-10-CM

## 2025-05-22 DIAGNOSIS — R79.89 ELEVATED PLATELET COUNT: ICD-10-CM

## 2025-05-22 DIAGNOSIS — E28.2 PCOS (POLYCYSTIC OVARIAN SYNDROME): ICD-10-CM

## 2025-05-27 ENCOUNTER — HOSPITAL ENCOUNTER (EMERGENCY)
Age: 35
Discharge: HOME | End: 2025-05-27
Attending: STUDENT IN AN ORGANIZED HEALTH CARE EDUCATION/TRAINING PROGRAM
Payer: COMMERCIAL

## 2025-05-27 ENCOUNTER — APPOINTMENT (OUTPATIENT)
Dept: GENERAL RADIOLOGY | Age: 35
End: 2025-05-27
Payer: COMMERCIAL

## 2025-05-27 VITALS
HEART RATE: 83 BPM | DIASTOLIC BLOOD PRESSURE: 89 MMHG | SYSTOLIC BLOOD PRESSURE: 150 MMHG | BODY MASS INDEX: 45 KG/M2 | WEIGHT: 279.98 LBS | HEIGHT: 66 IN | TEMPERATURE: 97.9 F | OXYGEN SATURATION: 97 % | RESPIRATION RATE: 18 BRPM

## 2025-05-27 DIAGNOSIS — R11.0 NAUSEA: ICD-10-CM

## 2025-05-27 DIAGNOSIS — R42 LIGHTHEADEDNESS: Primary | ICD-10-CM

## 2025-05-27 LAB
ALBUMIN SERPL-MCNC: 4.4 G/DL (ref 3.5–5.2)
ALBUMIN/GLOB SERPL: 1.3 {RATIO} (ref 1–2.5)
ALP SERPL-CCNC: 64 U/L (ref 35–104)
ALT SERPL-CCNC: 15 U/L (ref 10–35)
ANION GAP SERPL CALCULATED.3IONS-SCNC: 13 MMOL/L (ref 9–16)
AST SERPL-CCNC: 19 U/L (ref 10–35)
BASOPHILS # BLD: 0 K/UL (ref 0–0.2)
BASOPHILS NFR BLD: 0 % (ref 0–2)
BILIRUB SERPL-MCNC: 0.5 MG/DL (ref 0–1.2)
BILIRUB UR QL STRIP: NEGATIVE
BUN SERPL-MCNC: 11 MG/DL (ref 6–20)
CALCIUM SERPL-MCNC: 9.2 MG/DL (ref 8.6–10.4)
CHARACTER UR: ABNORMAL
CHLORIDE SERPL-SCNC: 103 MMOL/L (ref 98–107)
CLARITY UR: CLEAR
CO2 SERPL-SCNC: 22 MMOL/L (ref 20–31)
COLOR UR: YELLOW
CREAT SERPL-MCNC: 0.7 MG/DL (ref 0.6–0.9)
EOSINOPHIL # BLD: 0.1 K/UL (ref 0–0.4)
EOSINOPHILS RELATIVE PERCENT: 1 % (ref 1–4)
EPI CELLS #/AREA URNS HPF: ABNORMAL /HPF (ref 0–5)
ERYTHROCYTE [DISTWIDTH] IN BLOOD BY AUTOMATED COUNT: 13.8 % (ref 12.5–15.4)
GFR, ESTIMATED: >90 ML/MIN/1.73M2
GLUCOSE SERPL-MCNC: 95 MG/DL (ref 74–99)
GLUCOSE UR STRIP-MCNC: NEGATIVE MG/DL
HCG UR QL: NEGATIVE
HCT VFR BLD AUTO: 44.1 % (ref 36–46)
HGB BLD-MCNC: 14.8 G/DL (ref 12–16)
HGB UR QL STRIP.AUTO: ABNORMAL
KETONES UR STRIP-MCNC: NEGATIVE MG/DL
LEUKOCYTE ESTERASE UR QL STRIP: NEGATIVE
LYMPHOCYTES NFR BLD: 2.1 K/UL (ref 1–4.8)
LYMPHOCYTES RELATIVE PERCENT: 23 % (ref 24–44)
MCH RBC QN AUTO: 28.3 PG (ref 26–34)
MCHC RBC AUTO-ENTMCNC: 33.5 G/DL (ref 31–37)
MCV RBC AUTO: 84.4 FL (ref 80–100)
MONOCYTES NFR BLD: 0.7 K/UL (ref 0.1–1.2)
MONOCYTES NFR BLD: 7 % (ref 2–11)
NEUTROPHILS NFR BLD: 69 % (ref 36–66)
NEUTS SEG NFR BLD: 6.5 K/UL (ref 1.8–7.7)
NITRITE UR QL STRIP: NEGATIVE
PH UR STRIP: 6 [PH] (ref 5–8)
PLATELET # BLD AUTO: 423 K/UL (ref 140–450)
PMV BLD AUTO: 7.4 FL (ref 6–12)
POTASSIUM SERPL-SCNC: 4.4 MMOL/L (ref 3.7–5.3)
PROT SERPL-MCNC: 7.9 G/DL (ref 6.6–8.7)
PROT UR STRIP-MCNC: NEGATIVE MG/DL
RBC # BLD AUTO: 5.23 M/UL (ref 4–5.2)
RBC #/AREA URNS HPF: ABNORMAL /HPF (ref 0–2)
SODIUM SERPL-SCNC: 138 MMOL/L (ref 136–145)
SP GR UR STRIP: <1.005 (ref 1–1.03)
TROPONIN I SERPL HS-MCNC: <6 NG/L (ref 0–14)
TSH SERPL DL<=0.05 MIU/L-ACNC: 0.54 UIU/ML (ref 0.27–4.2)
UROBILINOGEN UR STRIP-ACNC: NORMAL EU/DL (ref 0–1)
WBC #/AREA URNS HPF: ABNORMAL /HPF (ref 0–5)
WBC OTHER # BLD: 9.4 K/UL (ref 3.5–11)

## 2025-05-27 PROCEDURE — 81025 URINE PREGNANCY TEST: CPT

## 2025-05-27 PROCEDURE — 6370000000 HC RX 637 (ALT 250 FOR IP)

## 2025-05-27 PROCEDURE — 84443 ASSAY THYROID STIM HORMONE: CPT

## 2025-05-27 PROCEDURE — 99285 EMERGENCY DEPT VISIT HI MDM: CPT

## 2025-05-27 PROCEDURE — 80053 COMPREHEN METABOLIC PANEL: CPT

## 2025-05-27 PROCEDURE — 71045 X-RAY EXAM CHEST 1 VIEW: CPT

## 2025-05-27 PROCEDURE — 84484 ASSAY OF TROPONIN QUANT: CPT

## 2025-05-27 PROCEDURE — 93005 ELECTROCARDIOGRAM TRACING: CPT | Performed by: EMERGENCY MEDICINE

## 2025-05-27 PROCEDURE — 84439 ASSAY OF FREE THYROXINE: CPT

## 2025-05-27 PROCEDURE — 36415 COLL VENOUS BLD VENIPUNCTURE: CPT

## 2025-05-27 PROCEDURE — 81001 URINALYSIS AUTO W/SCOPE: CPT

## 2025-05-27 PROCEDURE — 85025 COMPLETE CBC W/AUTO DIFF WBC: CPT

## 2025-05-27 RX ORDER — MECLIZINE HCL 12.5 MG 12.5 MG/1
25 TABLET ORAL ONCE
Status: COMPLETED | OUTPATIENT
Start: 2025-05-27 | End: 2025-05-27

## 2025-05-27 RX ORDER — ONDANSETRON 4 MG/1
4 TABLET, FILM COATED ORAL 3 TIMES DAILY PRN
Qty: 15 TABLET | Refills: 0 | Status: SHIPPED | OUTPATIENT
Start: 2025-05-27

## 2025-05-27 RX ORDER — ONDANSETRON 4 MG/1
4 TABLET, ORALLY DISINTEGRATING ORAL ONCE
Status: DISCONTINUED | OUTPATIENT
Start: 2025-05-27 | End: 2025-05-27

## 2025-05-27 RX ORDER — ONDANSETRON 4 MG/1
4 TABLET, ORALLY DISINTEGRATING ORAL ONCE
Status: COMPLETED | OUTPATIENT
Start: 2025-05-27 | End: 2025-05-27

## 2025-05-27 RX ORDER — MECLIZINE HYDROCHLORIDE 25 MG/1
25 TABLET ORAL 3 TIMES DAILY PRN
Qty: 15 TABLET | Refills: 0 | Status: SHIPPED | OUTPATIENT
Start: 2025-05-27 | End: 2025-06-06

## 2025-05-27 RX ADMIN — CARBAMIDE PEROXIDE 6.5% 5 DROP: 6.5 LIQUID AURICULAR (OTIC) at 19:34

## 2025-05-27 RX ADMIN — MECLIZINE 25 MG: 12.5 TABLET ORAL at 17:56

## 2025-05-27 RX ADMIN — ONDANSETRON 4 MG: 4 TABLET, ORALLY DISINTEGRATING ORAL at 14:48

## 2025-05-27 ASSESSMENT — PAIN DESCRIPTION - ORIENTATION: ORIENTATION: RIGHT;LEFT

## 2025-05-27 ASSESSMENT — LIFESTYLE VARIABLES
HOW OFTEN DO YOU HAVE A DRINK CONTAINING ALCOHOL: NEVER
HOW MANY STANDARD DRINKS CONTAINING ALCOHOL DO YOU HAVE ON A TYPICAL DAY: PATIENT DOES NOT DRINK

## 2025-05-27 ASSESSMENT — PAIN SCALES - GENERAL: PAINLEVEL_OUTOF10: 3

## 2025-05-27 ASSESSMENT — PAIN - FUNCTIONAL ASSESSMENT: PAIN_FUNCTIONAL_ASSESSMENT: 0-10

## 2025-05-27 ASSESSMENT — PAIN DESCRIPTION - LOCATION: LOCATION: LEG

## 2025-05-27 ASSESSMENT — PAIN DESCRIPTION - DESCRIPTORS: DESCRIPTORS: ACHING

## 2025-05-27 ASSESSMENT — PAIN DESCRIPTION - PAIN TYPE: TYPE: ACUTE PAIN

## 2025-05-27 NOTE — ED PROVIDER NOTES
[facility] Emergency Department  [address]  [facility] [address]  Phone: [phone]  Fax: [phone]      Patient Name:  Magda Alegria  Medical Record Number:  [id number]  YOB: 1990  Date of Service:  5/27/2025  Primary Care Physician:  Godwin Curtis MD      CHIEF COMPLAINT:       Chief Complaint   Patient presents with    Dizziness     Intermittent dizziness and feeling shakey.  Onset few weeks ago.  Pt also complains of bilateral leg pains for 3 days.  Recently started taking Metformin.        HISTORY OF PRESENT ILLNESS:    Magda Alegria is a 35 y.o. female who presents with the complaint of feeling lightheaded for approximately 2 weeks.  She also reports feeling slightly shaky with some mild nausea no chest pain no fevers no  GI symptoms.  Is have a history of PCOS and hypothyroidism.  She did recently restart metformin approximately 2 weeks ago.  No other neurological symptoms or cardiac symptoms related to this episode.    CURRENT MEDICATIONS:      Current Discharge Medication List        CONTINUE these medications which have NOT CHANGED    Details   ferrous gluconate (FERGON) 324 (38 Fe) MG tablet Take 1 tablet by mouth every other day  Qty: 15 tablet, Refills: 3      metFORMIN (GLUCOPHAGE) 500 MG tablet Take 1 tablet by mouth 2 times daily (with meals)  Qty: 180 tablet, Refills: 1      levothyroxine (SYNTHROID) 88 MCG tablet TAKE 1 TABLET BY MOUTH IN THE MORNING IMMEDIATELY UPON ARISING, DELAY EATING/DRINKING FOR 30 MINUTES  Qty: 90 tablet, Refills: 3    Associated Diagnoses: Other specified hypothyroidism             ALLERGIES:   has no known allergies.    PAST MEDICAL HISTORY:    has a past medical history of Anemia, Anxiety, Hypothyroidism, Infertility, female, Migraines, Obesity, PCOS (polycystic ovarian syndrome), and Pre-eclampsia.    SURGICAL HISTORY:      has a past surgical history that includes Tonsillectomy (05/2013); [place] tooth extraction (12/2011);

## 2025-05-28 LAB — T4 FREE SERPL-MCNC: 1.5 NG/DL (ref 0.9–1.7)

## 2025-05-29 ENCOUNTER — HOSPITAL ENCOUNTER (OUTPATIENT)
Age: 35
Setting detail: SPECIMEN
Discharge: HOME | End: 2025-05-29

## 2025-05-29 ENCOUNTER — OFFICE VISIT (OUTPATIENT)
Age: 35
End: 2025-05-29
Payer: COMMERCIAL

## 2025-05-29 ENCOUNTER — RESULTS FOLLOW-UP (OUTPATIENT)
Age: 35
End: 2025-05-29

## 2025-05-29 VITALS
DIASTOLIC BLOOD PRESSURE: 84 MMHG | BODY MASS INDEX: 46.13 KG/M2 | SYSTOLIC BLOOD PRESSURE: 130 MMHG | WEIGHT: 283.6 LBS | HEART RATE: 72 BPM | OXYGEN SATURATION: 97 % | TEMPERATURE: 98.7 F

## 2025-05-29 DIAGNOSIS — R42 LIGHTHEADED: Primary | ICD-10-CM

## 2025-05-29 DIAGNOSIS — E78.5 DYSLIPIDEMIA: ICD-10-CM

## 2025-05-29 DIAGNOSIS — H61.21 IMPACTED CERUMEN OF RIGHT EAR: ICD-10-CM

## 2025-05-29 DIAGNOSIS — H65.91 MEE (MIDDLE EAR EFFUSION), RIGHT: ICD-10-CM

## 2025-05-29 DIAGNOSIS — E28.2 PCOS (POLYCYSTIC OVARIAN SYNDROME): ICD-10-CM

## 2025-05-29 DIAGNOSIS — E83.42 HYPOMAGNESEMIA: ICD-10-CM

## 2025-05-29 DIAGNOSIS — R79.89 ELEVATED PLATELET COUNT: ICD-10-CM

## 2025-05-29 DIAGNOSIS — E55.9 VITAMIN D DEFICIENCY: ICD-10-CM

## 2025-05-29 DIAGNOSIS — E03.9 HYPOTHYROIDISM, UNSPECIFIED TYPE: ICD-10-CM

## 2025-05-29 DIAGNOSIS — R00.2 PALPITATIONS: ICD-10-CM

## 2025-05-29 DIAGNOSIS — G43.009 MIGRAINE WITHOUT AURA AND WITHOUT STATUS MIGRAINOSUS, NOT INTRACTABLE: ICD-10-CM

## 2025-05-29 LAB
ALBUMIN SERPL-MCNC: 4.3 G/DL (ref 3.5–5.2)
ALBUMIN/GLOB SERPL: 1.4 {RATIO} (ref 1–2.5)
ALP SERPL-CCNC: 62 U/L (ref 35–104)
ALT SERPL-CCNC: 14 U/L (ref 10–35)
ANION GAP SERPL CALCULATED.3IONS-SCNC: 11 MMOL/L (ref 9–16)
AST SERPL-CCNC: 16 U/L (ref 10–35)
BASOPHILS # BLD: 0.04 K/UL (ref 0–0.2)
BASOPHILS NFR BLD: 1 % (ref 0–2)
BILIRUB SERPL-MCNC: 0.6 MG/DL (ref 0–1.2)
BUN SERPL-MCNC: 12 MG/DL (ref 6–20)
CALCIUM SERPL-MCNC: 9.3 MG/DL (ref 8.6–10.4)
CHLORIDE SERPL-SCNC: 103 MMOL/L (ref 98–107)
CHOLEST SERPL-MCNC: 204 MG/DL (ref 0–199)
CHOLESTEROL/HDL RATIO: 4.4
CO2 SERPL-SCNC: 25 MMOL/L (ref 20–31)
CREAT SERPL-MCNC: 0.7 MG/DL (ref 0.6–0.9)
EKG ATRIAL RATE: 89 BPM
EKG P AXIS: 42 DEGREES
EKG P-R INTERVAL: 172 MS
EKG Q-T INTERVAL: 376 MS
EKG QRS DURATION: 78 MS
EKG QTC CALCULATION (BAZETT): 457 MS
EKG R AXIS: 34 DEGREES
EKG T AXIS: 49 DEGREES
EKG VENTRICULAR RATE: 89 BPM
EOSINOPHIL # BLD: 0.1 K/UL (ref 0–0.44)
EOSINOPHILS RELATIVE PERCENT: 2 % (ref 1–4)
ERYTHROCYTE [DISTWIDTH] IN BLOOD BY AUTOMATED COUNT: 12.8 % (ref 11.8–14.4)
GFR, ESTIMATED: >90 ML/MIN/1.73M2
GLUCOSE SERPL-MCNC: 87 MG/DL (ref 74–99)
HCT VFR BLD AUTO: 43.7 % (ref 36.3–47.1)
HDLC SERPL-MCNC: 46 MG/DL
HGB BLD-MCNC: 14.4 G/DL (ref 11.9–15.1)
IMM GRANULOCYTES # BLD AUTO: <0.03 K/UL (ref 0–0.3)
IMM GRANULOCYTES NFR BLD: 0 %
LDLC SERPL CALC-MCNC: 129 MG/DL (ref 0–100)
LYMPHOCYTES NFR BLD: 2.53 K/UL (ref 1.1–3.7)
LYMPHOCYTES RELATIVE PERCENT: 40 % (ref 24–43)
MCH RBC QN AUTO: 28.2 PG (ref 25.2–33.5)
MCHC RBC AUTO-ENTMCNC: 33 G/DL (ref 28.4–34.8)
MCV RBC AUTO: 85.7 FL (ref 82.6–102.9)
MONOCYTES NFR BLD: 0.59 K/UL (ref 0.1–1.2)
MONOCYTES NFR BLD: 9 % (ref 3–12)
NEUTROPHILS NFR BLD: 48 % (ref 36–65)
NEUTS SEG NFR BLD: 3.1 K/UL (ref 1.5–8.1)
NRBC BLD-RTO: 0 PER 100 WBC
PLATELET # BLD AUTO: 401 K/UL (ref 138–453)
PMV BLD AUTO: 9.7 FL (ref 8.1–13.5)
POTASSIUM SERPL-SCNC: 4.2 MMOL/L (ref 3.7–5.3)
PROT SERPL-MCNC: 7.3 G/DL (ref 6.6–8.7)
RBC # BLD AUTO: 5.1 M/UL (ref 3.95–5.11)
SODIUM SERPL-SCNC: 139 MMOL/L (ref 136–145)
TRIGL SERPL-MCNC: 146 MG/DL
VLDLC SERPL CALC-MCNC: 29 MG/DL (ref 1–30)
WBC OTHER # BLD: 6.4 K/UL (ref 3.5–11.3)

## 2025-05-29 PROCEDURE — 69210 REMOVE IMPACTED EAR WAX UNI: CPT | Performed by: STUDENT IN AN ORGANIZED HEALTH CARE EDUCATION/TRAINING PROGRAM

## 2025-05-29 PROCEDURE — 99214 OFFICE O/P EST MOD 30 MIN: CPT | Performed by: STUDENT IN AN ORGANIZED HEALTH CARE EDUCATION/TRAINING PROGRAM

## 2025-05-29 PROCEDURE — 93010 ELECTROCARDIOGRAM REPORT: CPT | Performed by: INTERNAL MEDICINE

## 2025-05-29 ASSESSMENT — ENCOUNTER SYMPTOMS
DIARRHEA: 0
NAUSEA: 0
CHEST TIGHTNESS: 0
VOMITING: 0
RHINORRHEA: 0
SHORTNESS OF BREATH: 0
CONSTIPATION: 0
SORE THROAT: 0

## 2025-05-29 NOTE — ED PROVIDER NOTES
[facility] Emergency Department  [address]  [facility] [address]  Phone: [phone]  Fax: [phone]      Attending Physician Attestation    I personally made/approves the management plan for this patient's and take responsibility for the patient management.    35-year-old female presents to the emergency department due to 2 weeks of intermittent lightheadedness, near syncope.  Denies any fully syncopal episodes.  Denies any chest pain, difficulty breathing.  Exam benign, she does report that her ear has been bothering her as well, on exam she has a right-sided cerumen impaction, will plan to irrigate, has symptomatic improvement after meclizine, discussed cardiac workup, chest x-ray and urine are negative.  She also reports associated mild nausea, no vomiting.  Discussed return precautions as well as need for PCP follow-up    ED Course as of 05/29/25 6491   Tue May 27, 2025   1435 EKG: Sinus rhythm, normal axis, heart rate 89, parable 172, QTc 457, no significant ST elevation or depression, unremarkable T waves [SS]      ED Course User Index  [SS] Eva Persaud MD       (Please note that portions of this note were completed with a voice recognition program.  Efforts were made to edit the dictations but occasionally words are mis-transcribed.)    Eva Persaud MD  Attending Emergency Medicine Physician       Eva Persaud MD  05/29/25 6608

## 2025-05-29 NOTE — PATIENT INSTRUCTIONS
Magda    Thank you for choosing [facility].  We know you have options when it comes to your healthcare; we appreciate that you chose us. Our goal is to provide exceptional  service and world class care to every patient.  You will be receiving a survey via email or text message asking for your feedback.  Please take a few minutes to share your thoughts about your recent visit. Your comments help us understand what we do well and ways we can improve.  Thank you in advance for your valuable feedback.      Dr. Ever Lockhart, CMA

## 2025-05-29 NOTE — PROGRESS NOTES
Magda Alegria (:  1990) is a 35 y.o. female, Established patient, here for evaluation of the following chief complaint(s):  Follow-up (Summary in the chart . Light headness possibly right ear impaction. )         Assessment & Plan  1. Lightheadedness.  - Blood pressure readings are within normal range. Cardiac evaluations, including an EKG and echocardiogram, have yielded normal results.  - The possibility of lightheadedness being a symptom of premenstrual syndrome (PMS) was discussed.  - A repeat magnesium level will be ordered due to a previous low reading of 1.7. An MRI of the brain will be ordered to investigate the cause of her periodic dizzy spells and migraines.   - Referrals to a vertigo specialist and a neurologist will be made. She is advised to continue her iron and magnesium supplements, along with a regular multivitamin and B12. Adequate hydration with electrolyte water, consuming at least 64 ounces daily, is recommended.    2. Headaches.  - Reports intermittent headaches, possibly related to caffeine withdrawal.  - An MRI of the brain will be ordered to investigate further.  - A referral to a neurologist will be made for her headaches.  - Advised to monitor for any patterns related to her menstrual cycle.    3. Hypothyroidism.  - Thyroid levels, including TSH and T4, are within normal range as of 2025.  - Currently on thyroid medication.  - No new symptoms reported, and current management appears effective.    4. Polycystic Ovary Syndrome (PCOS).  - Under the care of GYN for PCOS.  - Taking metformin.  - No new symptoms reported, and current management appears effective.    5. Hypercholesterolemia.  - Cholesterol levels were previously high.  - Repeated labs this morning, and results are pending.  - Will continue to monitor and manage based on upcoming lab results.    6. Ear Impaction.  - Right ear was severely impacted and could not be irrigated at the hospital.  - Ear drops were

## 2025-06-02 RX ORDER — IBUPROFEN 800 MG/1
800 TABLET, FILM COATED ORAL EVERY 8 HOURS PRN
Qty: 30 TABLET | Refills: 0 | Status: SHIPPED | OUTPATIENT
Start: 2025-06-02

## 2025-06-05 ENCOUNTER — TELEPHONE (OUTPATIENT)
Age: 35
End: 2025-06-05

## 2025-06-05 ENCOUNTER — APPOINTMENT (OUTPATIENT)
Dept: CT IMAGING | Age: 35
End: 2025-06-05
Payer: COMMERCIAL

## 2025-06-05 ENCOUNTER — HOSPITAL ENCOUNTER (OUTPATIENT)
Age: 35
Setting detail: OBSERVATION
Discharge: HOME | End: 2025-06-06
Attending: EMERGENCY MEDICINE | Admitting: FAMILY MEDICINE
Payer: COMMERCIAL

## 2025-06-05 ENCOUNTER — APPOINTMENT (OUTPATIENT)
Dept: GENERAL RADIOLOGY | Age: 35
End: 2025-06-05
Payer: COMMERCIAL

## 2025-06-05 DIAGNOSIS — I16.0 HYPERTENSIVE URGENCY: Primary | ICD-10-CM

## 2025-06-05 DIAGNOSIS — G93.2 IDIOPATHIC INTRACRANIAL HYPERTENSION: ICD-10-CM

## 2025-06-05 DIAGNOSIS — H65.91 MEE (MIDDLE EAR EFFUSION), RIGHT: ICD-10-CM

## 2025-06-05 DIAGNOSIS — G43.009 MIGRAINE WITHOUT AURA AND WITHOUT STATUS MIGRAINOSUS, NOT INTRACTABLE: ICD-10-CM

## 2025-06-05 DIAGNOSIS — E03.9 HYPOTHYROIDISM, UNSPECIFIED TYPE: Primary | ICD-10-CM

## 2025-06-05 LAB
ALBUMIN SERPL-MCNC: 4.6 G/DL (ref 3.5–5.2)
ALBUMIN/GLOB SERPL: 1.4 {RATIO} (ref 1–2.5)
ALP SERPL-CCNC: 63 U/L (ref 35–104)
ALT SERPL-CCNC: 15 U/L (ref 10–35)
ANION GAP SERPL CALCULATED.3IONS-SCNC: 15 MMOL/L (ref 9–16)
AST SERPL-CCNC: 18 U/L (ref 10–35)
BACTERIA URNS QL MICRO: ABNORMAL
BASOPHILS # BLD: 0 K/UL (ref 0–0.2)
BASOPHILS NFR BLD: 0 % (ref 0–2)
BILIRUB SERPL-MCNC: 0.4 MG/DL (ref 0–1.2)
BILIRUB UR QL STRIP: NEGATIVE
BNP SERPL-MCNC: 46 PG/ML (ref 0–125)
BUN SERPL-MCNC: 13 MG/DL (ref 6–20)
CALCIUM SERPL-MCNC: 9.2 MG/DL (ref 8.6–10.4)
CHLORIDE SERPL-SCNC: 102 MMOL/L (ref 98–107)
CLARITY UR: CLEAR
CO2 SERPL-SCNC: 22 MMOL/L (ref 20–31)
COLOR UR: YELLOW
CREAT SERPL-MCNC: 0.8 MG/DL (ref 0.6–0.9)
EOSINOPHIL # BLD: 0.1 K/UL (ref 0–0.4)
EOSINOPHILS RELATIVE PERCENT: 1 % (ref 1–4)
EPI CELLS #/AREA URNS HPF: ABNORMAL /HPF (ref 0–5)
ERYTHROCYTE [DISTWIDTH] IN BLOOD BY AUTOMATED COUNT: 14.1 % (ref 12.5–15.4)
GFR, ESTIMATED: >90 ML/MIN/1.73M2
GLUCOSE SERPL-MCNC: 95 MG/DL (ref 74–99)
GLUCOSE UR STRIP-MCNC: NEGATIVE MG/DL
HCG UR QL: NEGATIVE
HCT VFR BLD AUTO: 43.9 % (ref 36–46)
HGB BLD-MCNC: 14.6 G/DL (ref 12–16)
HGB UR QL STRIP.AUTO: NEGATIVE
KETONES UR STRIP-MCNC: ABNORMAL MG/DL
LEUKOCYTE ESTERASE UR QL STRIP: ABNORMAL
LYMPHOCYTES NFR BLD: 2.2 K/UL (ref 1–4.8)
LYMPHOCYTES RELATIVE PERCENT: 25 % (ref 24–44)
MCH RBC QN AUTO: 28.2 PG (ref 26–34)
MCHC RBC AUTO-ENTMCNC: 33.2 G/DL (ref 31–37)
MCV RBC AUTO: 84.8 FL (ref 80–100)
MONOCYTES NFR BLD: 0.6 K/UL (ref 0.1–1.2)
MONOCYTES NFR BLD: 7 % (ref 2–11)
MUCOUS THREADS URNS QL MICRO: ABNORMAL
NEUTROPHILS NFR BLD: 67 % (ref 36–66)
NEUTS SEG NFR BLD: 6.2 K/UL (ref 1.8–7.7)
NITRITE UR QL STRIP: NEGATIVE
PH UR STRIP: 6 [PH] (ref 5–8)
PLATELET # BLD AUTO: 430 K/UL (ref 140–450)
PMV BLD AUTO: 7.7 FL (ref 6–12)
POTASSIUM SERPL-SCNC: 3.9 MMOL/L (ref 3.7–5.3)
PROT SERPL-MCNC: 7.9 G/DL (ref 6.6–8.7)
PROT UR STRIP-MCNC: NEGATIVE MG/DL
RBC # BLD AUTO: 5.18 M/UL (ref 4–5.2)
RBC #/AREA URNS HPF: ABNORMAL /HPF (ref 0–2)
SODIUM SERPL-SCNC: 139 MMOL/L (ref 136–145)
SP GR UR STRIP: 1.01 (ref 1–1.03)
T4 FREE SERPL-MCNC: 1.5 NG/DL (ref 0.9–1.7)
TROPONIN I SERPL HS-MCNC: <6 NG/L (ref 0–14)
TSH SERPL DL<=0.05 MIU/L-ACNC: 0.41 UIU/ML (ref 0.27–4.2)
UROBILINOGEN UR STRIP-ACNC: NORMAL EU/DL (ref 0–1)
WBC #/AREA URNS HPF: ABNORMAL /HPF (ref 0–5)
WBC OTHER # BLD: 9.1 K/UL (ref 3.5–11)

## 2025-06-05 PROCEDURE — 99285 EMERGENCY DEPT VISIT HI MDM: CPT

## 2025-06-05 PROCEDURE — 96375 TX/PRO/DX INJ NEW DRUG ADDON: CPT

## 2025-06-05 PROCEDURE — 85025 COMPLETE CBC W/AUTO DIFF WBC: CPT

## 2025-06-05 PROCEDURE — 6370000000 HC RX 637 (ALT 250 FOR IP): Performed by: EMERGENCY MEDICINE

## 2025-06-05 PROCEDURE — 93005 ELECTROCARDIOGRAM TRACING: CPT | Performed by: EMERGENCY MEDICINE

## 2025-06-05 PROCEDURE — 96374 THER/PROPH/DIAG INJ IV PUSH: CPT

## 2025-06-05 PROCEDURE — 84484 ASSAY OF TROPONIN QUANT: CPT

## 2025-06-05 PROCEDURE — 6360000002 HC RX W HCPCS: Performed by: EMERGENCY MEDICINE

## 2025-06-05 PROCEDURE — 96361 HYDRATE IV INFUSION ADD-ON: CPT

## 2025-06-05 PROCEDURE — 83880 ASSAY OF NATRIURETIC PEPTIDE: CPT

## 2025-06-05 PROCEDURE — 81001 URINALYSIS AUTO W/SCOPE: CPT

## 2025-06-05 PROCEDURE — G0378 HOSPITAL OBSERVATION PER HR: HCPCS

## 2025-06-05 PROCEDURE — 81025 URINE PREGNANCY TEST: CPT

## 2025-06-05 PROCEDURE — 71046 X-RAY EXAM CHEST 2 VIEWS: CPT

## 2025-06-05 PROCEDURE — 84443 ASSAY THYROID STIM HORMONE: CPT

## 2025-06-05 PROCEDURE — 70450 CT HEAD/BRAIN W/O DYE: CPT

## 2025-06-05 PROCEDURE — 80053 COMPREHEN METABOLIC PANEL: CPT

## 2025-06-05 PROCEDURE — 84439 ASSAY OF FREE THYROXINE: CPT

## 2025-06-05 PROCEDURE — 2580000003 HC RX 258: Performed by: EMERGENCY MEDICINE

## 2025-06-05 RX ORDER — HYDRALAZINE HYDROCHLORIDE 20 MG/ML
5 INJECTION INTRAMUSCULAR; INTRAVENOUS EVERY 4 HOURS PRN
Status: DISCONTINUED | OUTPATIENT
Start: 2025-06-05 | End: 2025-06-06 | Stop reason: HOSPADM

## 2025-06-05 RX ORDER — ACETAMINOPHEN 650 MG/1
650 SUPPOSITORY RECTAL EVERY 6 HOURS PRN
Status: DISCONTINUED | OUTPATIENT
Start: 2025-06-05 | End: 2025-06-06 | Stop reason: HOSPADM

## 2025-06-05 RX ORDER — HYDRALAZINE HYDROCHLORIDE 20 MG/ML
5 INJECTION INTRAMUSCULAR; INTRAVENOUS ONCE
Status: COMPLETED | OUTPATIENT
Start: 2025-06-05 | End: 2025-06-05

## 2025-06-05 RX ORDER — SODIUM CHLORIDE 9 MG/ML
INJECTION, SOLUTION INTRAVENOUS PRN
Status: DISCONTINUED | OUTPATIENT
Start: 2025-06-05 | End: 2025-06-06 | Stop reason: HOSPADM

## 2025-06-05 RX ORDER — SODIUM CHLORIDE 0.9 % (FLUSH) 0.9 %
5-40 SYRINGE (ML) INJECTION EVERY 12 HOURS SCHEDULED
Status: DISCONTINUED | OUTPATIENT
Start: 2025-06-05 | End: 2025-06-06 | Stop reason: HOSPADM

## 2025-06-05 RX ORDER — 0.9 % SODIUM CHLORIDE 0.9 %
1000 INTRAVENOUS SOLUTION INTRAVENOUS ONCE
Status: COMPLETED | OUTPATIENT
Start: 2025-06-05 | End: 2025-06-05

## 2025-06-05 RX ORDER — ONDANSETRON 4 MG/1
4 TABLET, ORALLY DISINTEGRATING ORAL 3 TIMES DAILY PRN
Status: DISCONTINUED | OUTPATIENT
Start: 2025-06-05 | End: 2025-06-06 | Stop reason: HOSPADM

## 2025-06-05 RX ORDER — ONDANSETRON 2 MG/ML
4 INJECTION INTRAMUSCULAR; INTRAVENOUS ONCE
Status: COMPLETED | OUTPATIENT
Start: 2025-06-05 | End: 2025-06-05

## 2025-06-05 RX ORDER — POLYETHYLENE GLYCOL 3350 17 G/17G
17 POWDER, FOR SOLUTION ORAL DAILY PRN
Status: DISCONTINUED | OUTPATIENT
Start: 2025-06-05 | End: 2025-06-06 | Stop reason: HOSPADM

## 2025-06-05 RX ORDER — MAGNESIUM SULFATE IN WATER 40 MG/ML
2000 INJECTION, SOLUTION INTRAVENOUS PRN
Status: DISCONTINUED | OUTPATIENT
Start: 2025-06-05 | End: 2025-06-06 | Stop reason: HOSPADM

## 2025-06-05 RX ORDER — FERROUS GLUCONATE 324(38)MG
324 TABLET ORAL EVERY OTHER DAY
Status: DISCONTINUED | OUTPATIENT
Start: 2025-06-07 | End: 2025-06-06 | Stop reason: HOSPADM

## 2025-06-05 RX ORDER — BUTALBITAL, ACETAMINOPHEN AND CAFFEINE 50; 325; 40 MG/1; MG/1; MG/1
1 TABLET ORAL ONCE
Status: COMPLETED | OUTPATIENT
Start: 2025-06-05 | End: 2025-06-05

## 2025-06-05 RX ORDER — LEVOTHYROXINE SODIUM 88 UG/1
88 TABLET ORAL DAILY
Status: DISCONTINUED | OUTPATIENT
Start: 2025-06-06 | End: 2025-06-06 | Stop reason: HOSPADM

## 2025-06-05 RX ORDER — POTASSIUM CHLORIDE 1500 MG/1
40 TABLET, EXTENDED RELEASE ORAL PRN
Status: DISCONTINUED | OUTPATIENT
Start: 2025-06-05 | End: 2025-06-06 | Stop reason: HOSPADM

## 2025-06-05 RX ORDER — SODIUM CHLORIDE 0.9 % (FLUSH) 0.9 %
5-40 SYRINGE (ML) INJECTION PRN
Status: DISCONTINUED | OUTPATIENT
Start: 2025-06-05 | End: 2025-06-06 | Stop reason: HOSPADM

## 2025-06-05 RX ORDER — POTASSIUM CHLORIDE 7.45 MG/ML
10 INJECTION INTRAVENOUS PRN
Status: DISCONTINUED | OUTPATIENT
Start: 2025-06-05 | End: 2025-06-06 | Stop reason: HOSPADM

## 2025-06-05 RX ORDER — ACETAMINOPHEN 325 MG/1
650 TABLET ORAL EVERY 6 HOURS PRN
Status: DISCONTINUED | OUTPATIENT
Start: 2025-06-05 | End: 2025-06-06 | Stop reason: HOSPADM

## 2025-06-05 RX ORDER — MECLIZINE HCL 12.5 MG 12.5 MG/1
25 TABLET ORAL 3 TIMES DAILY PRN
Status: DISCONTINUED | OUTPATIENT
Start: 2025-06-05 | End: 2025-06-06 | Stop reason: HOSPADM

## 2025-06-05 RX ADMIN — HYDRALAZINE HYDROCHLORIDE 5 MG: 20 INJECTION INTRAMUSCULAR; INTRAVENOUS at 21:09

## 2025-06-05 RX ADMIN — ONDANSETRON 4 MG: 2 INJECTION, SOLUTION INTRAMUSCULAR; INTRAVENOUS at 18:41

## 2025-06-05 RX ADMIN — SODIUM CHLORIDE 1000 ML: 0.9 INJECTION, SOLUTION INTRAVENOUS at 18:38

## 2025-06-05 RX ADMIN — BUTALBITAL, ACETAMINOPHEN, AND CAFFEINE 1 TABLET: 325; 50; 40 TABLET ORAL at 19:03

## 2025-06-05 ASSESSMENT — ENCOUNTER SYMPTOMS
TROUBLE SWALLOWING: 0
VOICE CHANGE: 0
NAUSEA: 1
VOMITING: 0
SHORTNESS OF BREATH: 0
BACK PAIN: 0

## 2025-06-05 ASSESSMENT — PAIN SCALES - GENERAL: PAINLEVEL_OUTOF10: 5

## 2025-06-05 ASSESSMENT — PAIN - FUNCTIONAL ASSESSMENT: PAIN_FUNCTIONAL_ASSESSMENT: NONE - DENIES PAIN

## 2025-06-05 ASSESSMENT — PAIN DESCRIPTION - LOCATION: LOCATION: HEAD

## 2025-06-05 NOTE — TELEPHONE ENCOUNTER
Patient was referred to ENT and they can not get her in until October she is requesting a new referral be sent to [facility] in [place]

## 2025-06-05 NOTE — ED PROVIDER NOTES
6/5/2025 6:15 pm COMPARISON: None. HISTORY: ORDERING SYSTEM PROVIDED HISTORY: dizziness near syncope TECHNOLOGIST PROVIDED HISTORY: dizziness near syncope Reason for Exam: dizziness, near syncope FINDINGS: Normal cardiomediastinal silhouette.  No acute airspace infiltrate.  No pneumothorax or pleural effusion     No acute cardiopulmonary findings     CT HEAD WO CONTRAST   Preliminary Result   No acute intracranial abnormality.         XR CHEST (2 VW)   Final Result   No acute cardiopulmonary findings         MRI BRAIN W WO CONTRAST    (Results Pending)   MRV HEAD W WO CONTRAST    (Results Pending)        EKG  EKG Interpretation 1751  Interpreted by emergency department physician    Rhythm: normal sinus   Rate: 71  Axis: normal  Ectopy: none  Conduction: normal  ST Segments: no acute change  T Waves: no acute change  Q Waves: none    Clinical Impression: non-specific EKG  All EKG's are interpreted by the Emergency Department Physician whoeither signs or Co-signs this chart in the absence of a cardiologist.    EMERGENCY DEPARTMENT COURSE:  ED Course as of 06/06/25 0021   Thu Jun 05, 2025   1916 Patient got hypertensive with orthostatic vital signs and got symptomatic [AO]   1938 CT HEAD WO CONTRAST [AO]   1938 XR CHEST (2 VW) [AO]   2003 Dr. Nicole  [AO]      ED Course User Index  [AO] Netta Dangelo, DO       [facility]  Number of Diagnoses or Management Options  Hypertensive urgency  Diagnosis management comments: 35 y.o. female who presents with headache, dizziness that she describes more as a lightheadedness near syncopal, generalized weakness/fatigue, nausea but no vomiting.  She has had tinnitus in both ears bilaterally.  She has had some blurred vision, did recently see the eye doctor and is due to  a new prescription for glasses.  No recent falls or head injury.  No recent URI-like symptoms.  She does have a history of migraines, does not currently follow with a neurologist and the usually improved  with 100 mg of Motrin which she has already taken which have really not provided that much relief.  Her great-grandmother does have a history of MS.  She recently delivered her second child back in December but had postpartum preeclampsia resulting in a repeat admission for magnesium.  She was off of her Procardia 6 weeks postpartum.  She had follow-up with her PCP last week who ordered an outpatient MRI and placed a referral to neurology but states that she is unable to get into the office until August.  No history of seizure disorder.  There is no family history of myasthenia gravis.    On exam she sitting up in the chair.  Obviously anxious.  She is tearful talking about her postpartum experience.  She states that she recently did just start seeing a psychiatrist as well as a therapist.  Blood pressure noted be elevated on triage however will have this repeated once she is more comfortable and calm down.  Heart regular rate and rhythm.  Lungs clear.  She has no peripheral edema.  She is a with a GCS of 15.  Cranial nerves II through XII are intact.  Bilateral upper and lower strength and sensation intact.  Bilateral finger-nose normal.  No pronator drift.  Ambulated steadily independently.  Will give IV fluids, Zofran.  Fioricet.  She already took Motrin before arrival so cannot give Toradol.  Labs including troponin, TSH, BNP.  EKG.  Chest x-ray and CT of the head    Labs showing no anemia.  No white count.  Normal liver renal function.  Troponin not elevated.  BNP not elevated.  Chest x-ray clear.  CT head negative.  She had minimal improvement of her headache after fluids, Fioricet, Zofran.  Her orthostatic vital signs did provoke her symptoms however she became progressively more hypertensive when going from laying to sitting sitting to standing.  No tachycardia.  Patient is agreeable to admission if hospitalist agrees.  She is a patient of Levine, Susan. \Hospital [facility] a [facility] Name and Outlook.\"".  Did speak to the on-call physician,

## 2025-06-06 ENCOUNTER — APPOINTMENT (OUTPATIENT)
Dept: MRI IMAGING | Age: 35
End: 2025-06-06
Payer: COMMERCIAL

## 2025-06-06 VITALS
DIASTOLIC BLOOD PRESSURE: 90 MMHG | OXYGEN SATURATION: 97 % | SYSTOLIC BLOOD PRESSURE: 133 MMHG | HEART RATE: 80 BPM | TEMPERATURE: 98.3 F | RESPIRATION RATE: 18 BRPM | WEIGHT: 280 LBS | BODY MASS INDEX: 45 KG/M2 | HEIGHT: 66 IN

## 2025-06-06 PROBLEM — R51.9 CHRONIC DAILY HEADACHE: Status: ACTIVE | Noted: 2025-06-06

## 2025-06-06 PROBLEM — E66.813 CLASS 3 SEVERE OBESITY DUE TO EXCESS CALORIES WITH SERIOUS COMORBIDITY AND BODY MASS INDEX (BMI) OF 45.0 TO 49.9 IN ADULT (HCC): Status: ACTIVE | Noted: 2025-06-06

## 2025-06-06 PROBLEM — G93.2 IDIOPATHIC INTRACRANIAL HYPERTENSION: Status: ACTIVE | Noted: 2025-06-06

## 2025-06-06 PROCEDURE — 99222 1ST HOSP IP/OBS MODERATE 55: CPT | Performed by: STUDENT IN AN ORGANIZED HEALTH CARE EDUCATION/TRAINING PROGRAM

## 2025-06-06 PROCEDURE — 2500000003 HC RX 250 WO HCPCS: Performed by: FAMILY MEDICINE

## 2025-06-06 PROCEDURE — 6370000000 HC RX 637 (ALT 250 FOR IP): Performed by: FAMILY MEDICINE

## 2025-06-06 PROCEDURE — A9579 GAD-BASE MR CONTRAST NOS,1ML: HCPCS | Performed by: FAMILY MEDICINE

## 2025-06-06 PROCEDURE — G0378 HOSPITAL OBSERVATION PER HR: HCPCS

## 2025-06-06 PROCEDURE — 70546 MR ANGIOGRAPH HEAD W/O&W/DYE: CPT

## 2025-06-06 PROCEDURE — 6360000004 HC RX CONTRAST MEDICATION: Performed by: FAMILY MEDICINE

## 2025-06-06 PROCEDURE — 70553 MRI BRAIN STEM W/O & W/DYE: CPT

## 2025-06-06 PROCEDURE — 99222 1ST HOSP IP/OBS MODERATE 55: CPT | Performed by: FAMILY MEDICINE

## 2025-06-06 RX ORDER — LABETALOL 100 MG/1
100 TABLET, FILM COATED ORAL EVERY 12 HOURS SCHEDULED
Status: DISCONTINUED | OUTPATIENT
Start: 2025-06-06 | End: 2025-06-06 | Stop reason: HOSPADM

## 2025-06-06 RX ORDER — SODIUM CHLORIDE 0.9 % (FLUSH) 0.9 %
10 SYRINGE (ML) INJECTION PRN
Status: DISCONTINUED | OUTPATIENT
Start: 2025-06-06 | End: 2025-06-06 | Stop reason: HOSPADM

## 2025-06-06 RX ORDER — LABETALOL 100 MG/1
100 TABLET, FILM COATED ORAL EVERY 12 HOURS SCHEDULED
Qty: 60 TABLET | Refills: 3 | Status: SHIPPED | OUTPATIENT
Start: 2025-06-06

## 2025-06-06 RX ORDER — GADOTERIDOL 279.3 MG/ML
20 INJECTION INTRAVENOUS
Status: COMPLETED | OUTPATIENT
Start: 2025-06-06 | End: 2025-06-06

## 2025-06-06 RX ORDER — ALPRAZOLAM 0.25 MG
0.25 TABLET ORAL EVERY 4 HOURS PRN
Status: DISCONTINUED | OUTPATIENT
Start: 2025-06-06 | End: 2025-06-06 | Stop reason: HOSPADM

## 2025-06-06 RX ADMIN — LABETALOL HYDROCHLORIDE 100 MG: 100 TABLET, FILM COATED ORAL at 11:17

## 2025-06-06 RX ADMIN — SODIUM CHLORIDE, PRESERVATIVE FREE 10 ML: 5 INJECTION INTRAVENOUS at 11:17

## 2025-06-06 RX ADMIN — GADOTERIDOL 20 ML: 279.3 INJECTION, SOLUTION INTRAVENOUS at 07:53

## 2025-06-06 RX ADMIN — ALPRAZOLAM 0.25 MG: 0.25 TABLET ORAL at 07:35

## 2025-06-06 RX ADMIN — SODIUM CHLORIDE, PRESERVATIVE FREE 10 ML: 5 INJECTION INTRAVENOUS at 07:53

## 2025-06-06 RX ADMIN — SERTRALINE HYDROCHLORIDE 50 MG: 50 TABLET ORAL at 11:14

## 2025-06-06 RX ADMIN — ONDANSETRON 4 MG: 4 TABLET, ORALLY DISINTEGRATING ORAL at 00:17

## 2025-06-06 RX ADMIN — METFORMIN HYDROCHLORIDE 500 MG: 500 TABLET ORAL at 11:14

## 2025-06-06 RX ADMIN — LEVOTHYROXINE SODIUM 88 MCG: 0.09 TABLET ORAL at 11:14

## 2025-06-06 RX ADMIN — ALPRAZOLAM 0.25 MG: 0.25 TABLET ORAL at 02:51

## 2025-06-06 RX ADMIN — ACETAMINOPHEN 650 MG: 325 TABLET ORAL at 11:17

## 2025-06-06 NOTE — H&P
[facility]   IN-PATIENT [facility]    HISTORY AND PHYSICAL EXAMINATION            Date:   6/6/2025  Patient name:  Magda Alegria  Date of admission:  6/5/2025  5:34 PM  MRN:   [id number]  Account:  [id number]  YOB: 1990  PCP:    Godwin Curtis MD  Room:   [place] [address]  Code Status:    Full Code      History Obtained From:     patient    History of Present Illness:     Magda Alegria is a 35 y.o. Non- / non  female who presents with Dizziness (Dizziness, blurred vision, weakness, nausea X 2 days; seen here last week for dizziness, given meclizine, no relief)   and is admitted to the hospital for the management of Hypertensive urgency.    Patient has been suffering from intermittent headache, lightheadedness, nausea, and ear pressure for the last month, worsening over the last 2 days.  She has history of postpartum preeclampsia after delivering her son 6 months ago and required hospitalization 3 days with magnesium and Procardia.  After 6 weeks, her blood pressure had normalized and Procardia was able to be discontinued.  2 months ago she saw cardiology for palpitations.  She had an echo and Holter monitor that were normal.  She is now scheduled for sleep study in 2 weeks for possible obstructive sleep apnea.  Per patient she does snore at nighttime but her  has never seen her stop breathing.  The headache for which she has been suffering is usually worse in the morning time and feels like pressure going around her head.  She does have history of migraines that were worse during her first pregnancy but these headaches feel different.  She has had intermittent lightheadedness in which she feels like she could pass out but she has never had a syncopal episode.  The palpitations that she had previously evaluated by cardiology have resolved and are not related to the lightheaded episodes.  She has been seen by her PCP twice in the last month

## 2025-06-06 NOTE — CONSULTS
Home medications reviewed:  -aspirin 81mg  -zoloft 50mg daily  -metformin 500mg BID  -synthroid 88MCG daily  -meclizine 25mg TID PRN  -ibuprophen 800mg TID PRN  -zofran 4mg TID PRN  -iron supplement 324mg daily    CT head WO 25  IMPRESSION:  No acute intracranial abnormality.    MRI brain W/WO and MRV head W/WO 25  IMPRESSION:  Unremarkable pre and post-contrast MRI of the brain.  Unremarkable MRV of the brain.              Past Medical History:     Past Medical History:   Diagnosis Date    Anemia     Anxiety     Hypothyroidism 2020    Infertility, female     Migraines 2017    Obesity     PCOS (polycystic ovarian syndrome)     Pre-eclampsia 2022    While in labor        Past Surgical History:     Past Surgical History:   Procedure Laterality Date     SECTION N/A 2022     SECTION performed by Jerrell Mccray DO at [facility] L&D OR     SECTION N/A 2024     SECTION performed by Jerrell Mccray DO at [facility] L&D OR    TONSILLECTOMY  2013    WISDOM TOOTH EXTRACTION  2011        Medications Prior to Admission:     Prior to Admission medications    Medication Sig Start Date End Date Taking? Authorizing Provider   sertraline (ZOLOFT) 50 MG tablet Take 1 tablet by mouth daily   Yes Provider, MD Sadia   ibuprofen (ADVIL;MOTRIN) 800 MG tablet TAKE 1 TABLET BY MOUTH EVERY 8 HOURS AS NEEDED FOR PAIN 25  Yes Godwin Curtis MD   ferrous gluconate (FERGON) 324 (38 Fe) MG tablet Take 1 tablet by mouth every other day 25  Yes Godwin Curtis MD   ondansetron (ZOFRAN) 4 MG tablet Take 1 tablet by mouth 3 times daily as needed for Nausea or Vomiting 25  Yes Yariel Jamil APRN - NP   meclizine (ANTIVERT) 25 MG tablet Take 1 tablet by mouth 3 times daily as needed for Nausea or Dizziness 25 Yes Yariel Jamil APRN - NP   metFORMIN (GLUCOPHAGE) 500 MG tablet Take 1 tablet by mouth 2 times daily (with meals) 25  Yes Ever  MD Godwin   levothyroxine (SYNTHROID) 88 MCG tablet TAKE 1 TABLET BY MOUTH IN THE MORNING IMMEDIATELY UPON ARISING, DELAY EATING/DRINKING FOR 30 MINUTES 25  Yes Anupama Thurman DO   aspirin 81 MG chewable tablet Take 81 mg by mouth daily 22  Provider, MD Sadia        Allergies:     Patient has no known allergies.    Social History:     Tobacco:    reports that she has never smoked. She has never used smokeless tobacco.  Alcohol:      reports that she does not currently use alcohol.  Drug Use:  reports no history of drug use.    Family History:     Family History   Problem Relation Age of Onset    Diabetes Father     Hypertension Father     Asthma Sister     Diabetes Sister     Right Breast Cancer Neg Hx        Review of Systems:     ROS:  Constitutional  Negative for fever and chills    HEENT  Negative for ear discharge, ear pain, nosebleed    Eyes  Negative for photophobia, pain and discharge    Respiratory  Negative for hemoptysis and sputum    Cardiovascular  Negative for orthopnea, claudication and PND    Gastrointestinal  Negative for abdominal pain, diarrhea, blood in stool    Musculoskeletal  Negative for joint pain, negative for myalgia    Neurology Negative for seizures, loss of consciousness positive for chronic daily headaches concerning for IIH and episodic migraine   Skin  Negative for rash or itching    Endo/heme/allergies  Negative for polydipsia, environmental allergy    Psychiatric/behavioral  Negative for suicidal ideation. Patient is not anxious        Physical Exam:   BP (!) 144/98   Pulse 80   Temp 98.3 °F (36.8 °C) (Oral)   Resp 18   Ht 1.676 m (5' 6\")   Wt 127 kg (280 lb)   LMP 2025   SpO2 97%   BMI 45.19 kg/m²   Temp (24hrs), Av.3 °F (36.8 °C), Min:98.2 °F (36.8 °C), Max:98.3 °F (36.8 °C)    No results for input(s): \"POCGLU\" in the last 72 hours.  No intake or output data in the 24 hours ending 25 1249      NEUROLOGIC EXAMINATION  GENERAL

## 2025-06-06 NOTE — ED NOTES
Pt educated on outpatient follow ups. Pt states that since dose of labetalol headache has gotten better.

## 2025-06-07 LAB
EKG ATRIAL RATE: 71 BPM
EKG P AXIS: 33 DEGREES
EKG P-R INTERVAL: 170 MS
EKG Q-T INTERVAL: 406 MS
EKG QRS DURATION: 78 MS
EKG QTC CALCULATION (BAZETT): 441 MS
EKG R AXIS: 44 DEGREES
EKG T AXIS: 50 DEGREES
EKG VENTRICULAR RATE: 71 BPM

## 2025-06-07 PROCEDURE — 93010 ELECTROCARDIOGRAM REPORT: CPT | Performed by: INTERNAL MEDICINE

## 2025-06-09 ENCOUNTER — TELEPHONE (OUTPATIENT)
Age: 35
End: 2025-06-09

## 2025-06-09 ENCOUNTER — OFFICE VISIT (OUTPATIENT)
Age: 35
End: 2025-06-09
Payer: COMMERCIAL

## 2025-06-09 VITALS
DIASTOLIC BLOOD PRESSURE: 88 MMHG | WEIGHT: 279 LBS | OXYGEN SATURATION: 98 % | SYSTOLIC BLOOD PRESSURE: 130 MMHG | HEIGHT: 66 IN | HEART RATE: 70 BPM | BODY MASS INDEX: 44.84 KG/M2

## 2025-06-09 DIAGNOSIS — G93.2 IDIOPATHIC INTRACRANIAL HYPERTENSION: Primary | ICD-10-CM

## 2025-06-09 DIAGNOSIS — R35.0 URINARY FREQUENCY: ICD-10-CM

## 2025-06-09 DIAGNOSIS — I10 PRIMARY HYPERTENSION: ICD-10-CM

## 2025-06-09 DIAGNOSIS — G43.009 MIGRAINE WITHOUT AURA AND WITHOUT STATUS MIGRAINOSUS, NOT INTRACTABLE: ICD-10-CM

## 2025-06-09 PROCEDURE — 99214 OFFICE O/P EST MOD 30 MIN: CPT | Performed by: STUDENT IN AN ORGANIZED HEALTH CARE EDUCATION/TRAINING PROGRAM

## 2025-06-09 PROCEDURE — 3079F DIAST BP 80-89 MM HG: CPT | Performed by: STUDENT IN AN ORGANIZED HEALTH CARE EDUCATION/TRAINING PROGRAM

## 2025-06-09 PROCEDURE — 3075F SYST BP GE 130 - 139MM HG: CPT | Performed by: STUDENT IN AN ORGANIZED HEALTH CARE EDUCATION/TRAINING PROGRAM

## 2025-06-09 RX ORDER — LISINOPRIL 5 MG/1
5 TABLET ORAL DAILY
Qty: 90 TABLET | Refills: 1 | Status: SHIPPED | OUTPATIENT
Start: 2025-06-09

## 2025-06-09 ASSESSMENT — ENCOUNTER SYMPTOMS
RHINORRHEA: 0
CHEST TIGHTNESS: 0
NAUSEA: 0
VOMITING: 0
SORE THROAT: 0
CONSTIPATION: 0
DIARRHEA: 0
SHORTNESS OF BREATH: 0

## 2025-06-09 NOTE — PROGRESS NOTES
Magda Alegria (:  1990) is a 35 y.o. female, Established patient, here for evaluation of the following chief complaint(s):  Follow-Up from Hospital ([facility] 25-25 )         Assessment & Plan  1. Lightheadedness.  - The patient continues to experience lightheadedness, which may be a side effect of labetalol.  - Cardiac tests, including Holter monitor and echocardiogram, have returned normal results.  - Scheduled for a lumbar puncture on 2025 to investigate idiopathic intracranial hypertension (IIH).  - Urine culture ordered due to increased frequency of urination; advised to seek immediate medical attention if experiencing vision loss or weakness.    2. Chest pain.  - The patient reports intermittent chest pain, potentially a side effect of labetalol.  - Cardiac tests, including Holter monitor and echocardiogram, have returned normal results.  - Scheduled for a lumbar puncture on 2025 to investigate idiopathic intracranial hypertension (IIH).  - Urine culture ordered due to increased frequency of urination; advised to seek immediate medical attention if experiencing vision loss or weakness.  - she updated her cardiologist about her Sx already. She has an appt on 25 with them. She will call for new or worsening Sx and return to the ER for any more chest pains or SOB or concerning Sx. Mom and pt agreeable.     3. Hypertension.  - Blood pressure has normalized today, with a reading of 124/81 this morning.  - The patient is not tolerating labetalol well due to side effects, including tingling sensations and fogginess.  - Transition to lisinopril 5 mg planned; rapid weaning off labetalol with a half dose tonight and potentially another half dose tomorrow.  - Kidney function to be monitored in 1 to 2 weeks; instructed to provide an update on her condition within a week.    4. Idiopathic intracranial hypertension (IIH).  - MRI of the brain was clear, showing no venous abnormality or acute

## 2025-06-09 NOTE — TELEPHONE ENCOUNTER
Care Transitions Initial Follow Up Call    Outreach made within 2 business days of discharge: Yes    Patient: Magda Alegria Patient : 1990   MRN: [id number]  Reason for Admission: Hypertension   Discharge Date: 25       Spoke with: patient     Discharge department/facility: [facility] Interactive Patient Contact:  Was patient able to fill all prescriptions: Yes  Was patient instructed to bring all medications to the follow-up visit: Yes  Is patient taking all medications as directed in the discharge summary? Yes  Does patient understand their discharge instructions: Yes  Does patient have questions or concerns that need addressed prior to 7-14 day follow up office visit: no    Additional needs identified to be addressed with provider  No needs identified             Scheduled appointment with PCP within 7-14 days    Follow Up  Future Appointments   Date Time Provider Department Center   2025  4:20 PM Godwin Curtis MD [place] [facility] ECC DEP   2025  1:00 PM ST[facility] INTERVENT RM 1 STVZ SPECIAL STV Radiolog   2025  1:40 PM Kasandra Jacobo DO [facility]   2025  7:30 AM [place] [facility] RM [facility] PB MRI MPB [facility]   2025  3:20 PM Godwin Curtis MD [place] [facility] ECC DEP   2025  8:20 AM Rose Bright PA [facility] NEUR Neurology -   10/7/2025  2:00 PM Denisha Burgos AuD DPED [facility] AMB   10/7/2025  2:20 PM Trent Dunn MD DPED [facility] AMB       Makeda Ojeda MA

## 2025-06-09 NOTE — TELEPHONE ENCOUNTER
Care Transitions Initial Follow Up Call    Outreach made within 2 business days of discharge: Yes    Patient: Magda Alegria Patient : 1990   MRN: [id number]  Reason for Admission: HTN   Discharge Date: 25       Spoke with: LEFT MESSAGE TO CALL US BACK    Discharge department/facility: [facility]      Scheduled appointment with PCP within 7-14 days    Follow Up  Future Appointments   Date Time Provider Department Center   2025  4:20 PM Godwin Curtis MD [place] [facility] ECC DEP   2025  1:00 PM STV INTERVENT RM 1 STVZ SPECIAL STV Radiolog   2025  1:40 PM Kasandra Jacobo DO [facility]   2025  7:30 AM [place] MRI RM MHPB PB MRI MPB Pers   2025  3:20 PM Godwin Curtis MD [place] [facility] ECC DEP   2025  8:20 AM Rose Bright PA [facility] NEUR Neurology -   10/7/2025  2:00 PM Denisha Burgos AuD DPED [facility] AMB   10/7/2025  2:20 PM Trent Dunn MD DPED [facility] AMB       Kit Kim MA

## 2025-06-16 ENCOUNTER — HOSPITAL ENCOUNTER (OUTPATIENT)
Dept: INTERVENTIONAL RADIOLOGY/VASCULAR | Age: 35
Discharge: HOME | End: 2025-06-18
Payer: COMMERCIAL

## 2025-06-16 VITALS
TEMPERATURE: 97.5 F | SYSTOLIC BLOOD PRESSURE: 137 MMHG | HEART RATE: 82 BPM | DIASTOLIC BLOOD PRESSURE: 81 MMHG | OXYGEN SATURATION: 98 % | RESPIRATION RATE: 17 BRPM | HEIGHT: 66 IN | BODY MASS INDEX: 44.84 KG/M2 | WEIGHT: 279 LBS

## 2025-06-16 DIAGNOSIS — R35.0 URINARY FREQUENCY: ICD-10-CM

## 2025-06-16 DIAGNOSIS — G93.2 IDIOPATHIC INTRACRANIAL HYPERTENSION: ICD-10-CM

## 2025-06-16 DIAGNOSIS — G43.009 MIGRAINE WITHOUT AURA AND WITHOUT STATUS MIGRAINOSUS, NOT INTRACTABLE: ICD-10-CM

## 2025-06-16 DIAGNOSIS — I10 PRIMARY HYPERTENSION: ICD-10-CM

## 2025-06-16 LAB
APPEARANCE CSF: CLEAR
C GATTII+NEOFOR DNA CSF QL NAA+NON-PROBE: NOT DETECTED
CLOT CHECK: ABNORMAL
CMV DNA CSF QL NAA+NON-PROBE: NOT DETECTED
COLOR CSF: COLORLESS
E COLI K1 DNA CSF QL NAA+NON-PROBE: NOT DETECTED
EV RNA CSF QL NAA+NON-PROBE: NOT DETECTED
GLUCOSE CSF-MCNC: 53 MG/DL (ref 40–70)
GP B STREP DNA CSF QL NAA+NON-PROBE: NOT DETECTED
HAEM INFLU DNA CSF QL NAA+NON-PROBE: NOT DETECTED
HHV6 DNA CSF QL NAA+NON-PROBE: NOT DETECTED
HSV1 DNA CSF QL NAA+NON-PROBE: NOT DETECTED
HSV2 DNA CSF QL NAA+NON-PROBE: NOT DETECTED
L MONOCYTOG DNA CSF QL NAA+NON-PROBE: NOT DETECTED
N MEN DNA CSF QL NAA+NON-PROBE: NOT DETECTED
PARECHOVIRUS A RNA CSF QL NAA+NON-PROBE: NOT DETECTED
PROT CSF-MCNC: 19.1 MG/DL (ref 15–45)
RBC # FLD MANUAL: 1 CELLS/UL
S PNEUM DNA CSF QL NAA+NON-PROBE: NOT DETECTED
SPECIMEN DESCRIPTION: NORMAL
SPECIMEN VOL CSF: 10 ML
TOTAL CELLS COUNTED BRONCH: 0 CELLS/UL (ref 0–5)
TUBE # CSF: 4
VZV DNA CSF QL NAA+NON-PROBE: NOT DETECTED
XANTHOCHROMIA CSF QL: ABNORMAL

## 2025-06-16 PROCEDURE — 87483 CNS DNA AMP PROBE TYPE 12-25: CPT

## 2025-06-16 PROCEDURE — 62329 THER SPI PNXR CSF FLUOR/CT: CPT

## 2025-06-16 PROCEDURE — 7100000041 HC SPAR PHASE II RECOVERY - ADDTL 15 MIN

## 2025-06-16 PROCEDURE — 82784 ASSAY IGA/IGD/IGG/IGM EACH: CPT

## 2025-06-16 PROCEDURE — 87205 SMEAR GRAM STAIN: CPT

## 2025-06-16 PROCEDURE — 87070 CULTURE OTHR SPECIMN AEROBIC: CPT

## 2025-06-16 PROCEDURE — 82945 GLUCOSE OTHER FLUID: CPT

## 2025-06-16 PROCEDURE — 89050 BODY FLUID CELL COUNT: CPT

## 2025-06-16 PROCEDURE — 7100000040 HC SPAR PHASE II RECOVERY - FIRST 15 MIN

## 2025-06-16 PROCEDURE — 83916 OLIGOCLONAL BANDS: CPT

## 2025-06-16 PROCEDURE — 82042 OTHER SOURCE ALBUMIN QUAN EA: CPT

## 2025-06-16 PROCEDURE — 82040 ASSAY OF SERUM ALBUMIN: CPT

## 2025-06-16 PROCEDURE — 84157 ASSAY OF PROTEIN OTHER: CPT

## 2025-06-16 PROCEDURE — 87015 SPECIMEN INFECT AGNT CONCNTJ: CPT

## 2025-06-16 RX ORDER — SODIUM CHLORIDE 9 MG/ML
INJECTION, SOLUTION INTRAVENOUS CONTINUOUS
Status: DISCONTINUED | OUTPATIENT
Start: 2025-06-16 | End: 2025-06-19 | Stop reason: HOSPADM

## 2025-06-16 ASSESSMENT — PAIN - FUNCTIONAL ASSESSMENT: PAIN_FUNCTIONAL_ASSESSMENT: NONE - DENIES PAIN

## 2025-06-16 NOTE — H&P
Obesity, PCOS (polycystic ovarian syndrome), and Pre-eclampsia.  Past Surgical History   has a past surgical history that includes Tonsillectomy (2013); [place] tooth extraction (2011);  section (N/A, 2022); and  section (N/A, 2024).  Social History   reports that she has never smoked. She has never used smokeless tobacco.   reports that she does not currently use alcohol.   reports no history of drug use.  Occupation    Family History  Family Status   Relation Name Status    Father Pat (Not Specified)    Sister Carol (Not Specified)    Neg Hx  (Not Specified)   No partnership data on file     family history includes Asthma in her sister; Diabetes in her father and sister; Hypertension in her father.  Review of Systems:  CONSTITUTIONAL:   negative for fevers, chills, fatigue and malaise    EYES:   negative for double vision, blurred vision and photophobia    HEENT:   negative for tinnitus, epistaxis and sore throat     RESPIRATORY:   negative for cough, shortness of breath, wheezing     CARDIOVASCULAR:   negative for chest pain, palpitations, syncope, edema     GASTROINTESTINAL:   negative for nausea, vomiting     GENITOURINARY:   negative for incontinence     MUSCULOSKELETAL:   negative for neck or back pain     NEUROLOGICAL:   Negative for weakness and tingling+per HPI     PSYCHIATRIC:   negative for anxiety       OBJECTIVE:   VITALS:  height is 1.676 m (5' 6\") and weight is 126.6 kg (279 lb). Her temporal temperature is 97.7 °F (36.5 °C). Her blood pressure is 146/101 (abnormal) and her pulse is 78. Her respiration is 18 and oxygen saturation is 98%.   CONSTITUTIONAL:alert & oriented x 3, no acute distress. Calm and pleasant.   SKIN:  Warm and dry, no rashes on exposed areas of skin   HEAD:  Normocephalic, atraumatic   EYES: PERRL.  EOMs intact.  EARS:  Equal bilaterally, no edema or thickening, skin is intact without lumps or lesions. No discharge. Hearing grossly

## 2025-06-16 NOTE — BRIEF OP NOTE
Brief Postoperative Note Lumbar Puncture    Magda Alegria  YOB: 1990  [id number]    Pre-operative Diagnosis: HA    Post-operative Diagnosis: Same    Procedure: Fluoro guided Lumbar Puncture    Anesthesia: 1% Lidocaine    Surgeons/Assistants: Royce Arnold PA-C    Complications: None    EBL: Minimal    Specimens: Obtained and sent to lab    Fluoroscopy guided lumbar puncture. 20 gauge spinal needle. L3-L4. 10 ml clear CSF obtained.  OP 18 cm of H2O and CP 15 cm of H2O.  Dressing applied. Instructions given.    Electronically signed by DEXTER Capone on 6/16/2025 at 12:54 PM

## 2025-06-16 NOTE — PROGRESS NOTES
Pt presents to IR for LP with opening/closing pressures and specimens  Consent obtained  JR RENTERIA and JORDI RT to bedside  Timeout performed  Site prepped and draped with pt prone on table  Access obtained, opening pressure is 18cm of H20, closing pressure is 15cm of H2O and 10cc of clear CSF was removed  Samples collected and sent to lab  Site deaccessed and covered with baindaid  Pt tolerated well, report called to floor RN  IR CAROLE Ortiz spoke with pre-op CAROLE Jo who confirmed that a full yellow top tube was drawn for oligoclonal banding and sent to lab

## 2025-06-16 NOTE — DISCHARGE INSTRUCTIONS
Discharge Instructions      The procedure that you have undergone is called a lumbar puncture, .  There are a few important guidelines you should follow  which include:    Keep you head elevated 30-45 degrees for at least 8 hours following your lumbar pucture.    Drink large amounts of non-alcoholic beverages.  Alcohol can further dehydrate you, so we recommend liquids such as :  Juices, water, soda, etc.    DO NOT strain or over-exert yourself for several hours after your lumbar puncture.  Walking and normal activities are acceptable, as long as they are not overdone.No heavy lifting over 10 lbs. For 24 hours.    Flu-like symptoms may occur, or if you have questions, you may wish to call the Radiology Departments at [phone], Monday - Friday, 7:30 a.m. - 4:30 p.m.  After 4:30 p.m. And on weekends call [phone].      Please do not hesitate to ask if there are any further questions we can answer for you or anything else we can do to make you more comfortable.

## 2025-06-18 LAB
ALB CSF/SERPL: 2.6 RATIO (ref 0–9)
ALBUMIN CSF-MCNC: 11 MG/DL (ref 0–35)
ALBUMIN SERPL-MCNC: 4193 MG/DL (ref 3500–5200)
IGG CSF-MCNC: 1.6 MG/DL (ref 0–6)
IGG SERPL-MCNC: 1157 MG/DL (ref 768–1632)
IGG SYNTH RATE SER+CSF CALC-MRATE: <0 MG/D
IGG/ALB CLEAR SER+CSF-RTO: 0.53 RATIO (ref 0.28–0.66)
IGG/ALB CSF: 0.15 RATIO (ref 0.09–0.25)
OLIGOCLONAL BANDS CSF ELPH-IMP: NEGATIVE
OLIGOCLONAL BANDS CSF ELPH-IMP: NORMAL
OLIGOCLONAL BANDS CSF IEF: 1 BANDS (ref 0–1)

## 2025-06-19 ENCOUNTER — TELEPHONE (OUTPATIENT)
Age: 35
End: 2025-06-19

## 2025-06-19 LAB
MICROORGANISM SPEC CULT: NORMAL
MICROORGANISM/AGENT SPEC: NORMAL
SPECIMEN DESCRIPTION: NORMAL

## 2025-06-19 NOTE — TELEPHONE ENCOUNTER
Please be sure pt follows up with neuro for th results of the spinal fluid testing. She can call their office for updated or call for a f/u appt thanks

## 2025-07-16 NOTE — PATIENT INSTRUCTIONS
Magda    Thank you for choosing [facility].  We know you have options when it comes to your healthcare; we appreciate that you chose us. Our goal is to provide exceptional  service and world class care to every patient.  You will be receiving a survey via email or text message asking for your feedback.  Please take a few minutes to share your thoughts about your recent visit. Your comments help us understand what we do well and ways we can improve.  Thank you in advance for your valuable feedback.      Dr. Ever Lockhart, CMA

## 2025-07-18 ENCOUNTER — OFFICE VISIT (OUTPATIENT)
Age: 35
End: 2025-07-18
Payer: COMMERCIAL

## 2025-07-18 VITALS
HEART RATE: 99 BPM | SYSTOLIC BLOOD PRESSURE: 136 MMHG | WEIGHT: 287.2 LBS | TEMPERATURE: 98.8 F | DIASTOLIC BLOOD PRESSURE: 90 MMHG | BODY MASS INDEX: 46.36 KG/M2 | OXYGEN SATURATION: 97 %

## 2025-07-18 DIAGNOSIS — R42 LIGHTHEADED: ICD-10-CM

## 2025-07-18 DIAGNOSIS — F41.9 ANXIETY: ICD-10-CM

## 2025-07-18 DIAGNOSIS — E03.9 HYPOTHYROIDISM, UNSPECIFIED TYPE: Primary | ICD-10-CM

## 2025-07-18 DIAGNOSIS — G43.009 MIGRAINE WITHOUT AURA AND WITHOUT STATUS MIGRAINOSUS, NOT INTRACTABLE: ICD-10-CM

## 2025-07-18 PROCEDURE — 3080F DIAST BP >= 90 MM HG: CPT | Performed by: STUDENT IN AN ORGANIZED HEALTH CARE EDUCATION/TRAINING PROGRAM

## 2025-07-18 PROCEDURE — 99214 OFFICE O/P EST MOD 30 MIN: CPT | Performed by: STUDENT IN AN ORGANIZED HEALTH CARE EDUCATION/TRAINING PROGRAM

## 2025-07-18 PROCEDURE — 3075F SYST BP GE 130 - 139MM HG: CPT | Performed by: STUDENT IN AN ORGANIZED HEALTH CARE EDUCATION/TRAINING PROGRAM

## 2025-07-18 RX ORDER — IBUPROFEN 600 MG/1
600 TABLET, FILM COATED ORAL EVERY 8 HOURS PRN
Qty: 30 TABLET | Refills: 1 | Status: SHIPPED | OUTPATIENT
Start: 2025-07-18 | End: 2025-08-17

## 2025-07-18 SDOH — ECONOMIC STABILITY: FOOD INSECURITY: WITHIN THE PAST 12 MONTHS, THE FOOD YOU BOUGHT JUST DIDN'T LAST AND YOU DIDN'T HAVE MONEY TO GET MORE.: NEVER TRUE

## 2025-07-18 SDOH — ECONOMIC STABILITY: FOOD INSECURITY: WITHIN THE PAST 12 MONTHS, YOU WORRIED THAT YOUR FOOD WOULD RUN OUT BEFORE YOU GOT MONEY TO BUY MORE.: NEVER TRUE

## 2025-07-18 ASSESSMENT — PATIENT HEALTH QUESTIONNAIRE - PHQ9
SUM OF ALL RESPONSES TO PHQ QUESTIONS 1-9: 0
SUM OF ALL RESPONSES TO PHQ QUESTIONS 1-9: 0
1. LITTLE INTEREST OR PLEASURE IN DOING THINGS: NOT AT ALL
2. FEELING DOWN, DEPRESSED OR HOPELESS: NOT AT ALL
SUM OF ALL RESPONSES TO PHQ QUESTIONS 1-9: 0
SUM OF ALL RESPONSES TO PHQ QUESTIONS 1-9: 0

## 2025-07-18 ASSESSMENT — ENCOUNTER SYMPTOMS
NAUSEA: 0
RHINORRHEA: 0
SORE THROAT: 0
CONSTIPATION: 0
DIARRHEA: 0
SHORTNESS OF BREATH: 0
VOMITING: 0
CHEST TIGHTNESS: 0

## 2025-07-18 NOTE — PROGRESS NOTES
Magda Alegria (:  1990) is a 35 y.o. female, Established patient, here for evaluation of the following chief complaint(s):  2-3 month follow up          Assessment & Plan  1. Lightheadedness.  - Lightheadedness is likely due to dehydration, as the patient acknowledges not drinking enough water.  - Electrolytes are normal.  - Lumbar puncture results were normal, not confirming IIH diagnosis.  - Advised to increase water intake.  - She will continue following up with neurology.  Her symptoms are generally improving.    2. Anxiety.  - Anxiety exacerbated by physical symptoms such as chest pain and tachycardia.  - Currently seeing a therapist and psychiatrist.  - Possibility of using propranolol 10 mg immediate release as needed for physical anxiety symptoms discussed.  - Advised to consult cardiologist regarding this potential treatment option.  - We want to avoid any low blood pressure episodes or causing lightheadedness so I am hesitant to start a beta-blocker but she will discuss it with cardiology first.    3. Headaches.  - Headaches have improved with magnesium at night.  - Advised to continue magnesium regimen.  - Potential for rebound headaches from frequent use of Motrin discussed.  - Prescription for Motrin 600 mg will be provided.    4. Blood pressure management.  - Blood pressure well-controlled without medication, averaging in the 120s over 80s.  - Advised to continue monitoring blood pressure at home.  - Follow up with cardiologist in 2025.    Overall she is doing a bit better with no new concerns and she is working through her symptoms with her psychiatrist and her cardiologist.  So far all the testing is come back normal.  She will also continue following with neurology for the headaches.    Follow-up  - Patient will follow up in 3 months.    Results  Labs   - Lumbar puncture: Normal   - Electrolytes: Normal   - Kidney function: 2025, Normal   - Sugar levels: 2025, Normal   -

## 2025-08-07 ENCOUNTER — OFFICE VISIT (OUTPATIENT)
Dept: OBGYN CLINIC | Age: 35
End: 2025-08-07
Payer: COMMERCIAL

## 2025-08-07 VITALS
DIASTOLIC BLOOD PRESSURE: 70 MMHG | WEIGHT: 289.25 LBS | BODY MASS INDEX: 46.49 KG/M2 | SYSTOLIC BLOOD PRESSURE: 118 MMHG | HEIGHT: 66 IN

## 2025-08-07 DIAGNOSIS — E28.2 PCOS (POLYCYSTIC OVARIAN SYNDROME): Primary | ICD-10-CM

## 2025-08-07 PROBLEM — I16.0 HYPERTENSIVE URGENCY: Status: RESOLVED | Noted: 2025-06-05 | Resolved: 2025-08-07

## 2025-08-07 PROBLEM — R51.9 CHRONIC DAILY HEADACHE: Status: RESOLVED | Noted: 2025-06-06 | Resolved: 2025-08-07

## 2025-08-07 PROBLEM — F41.9 ANXIETY: Status: RESOLVED | Noted: 2017-05-02 | Resolved: 2025-08-07

## 2025-08-07 PROCEDURE — 99214 OFFICE O/P EST MOD 30 MIN: CPT | Performed by: STUDENT IN AN ORGANIZED HEALTH CARE EDUCATION/TRAINING PROGRAM

## 2025-08-07 RX ORDER — METOPROLOL SUCCINATE 50 MG/1
50 TABLET, EXTENDED RELEASE ORAL NIGHTLY
COMMUNITY
Start: 2025-07-22

## 2025-08-07 RX ORDER — SPIRONOLACTONE 25 MG/1
25 TABLET ORAL DAILY
Qty: 90 TABLET | Refills: 1 | Status: SHIPPED | OUTPATIENT
Start: 2025-08-07

## 2025-08-07 SDOH — ECONOMIC STABILITY: FOOD INSECURITY: WITHIN THE PAST 12 MONTHS, THE FOOD YOU BOUGHT JUST DIDN'T LAST AND YOU DIDN'T HAVE MONEY TO GET MORE.: NEVER TRUE

## 2025-08-07 SDOH — ECONOMIC STABILITY: FOOD INSECURITY: WITHIN THE PAST 12 MONTHS, YOU WORRIED THAT YOUR FOOD WOULD RUN OUT BEFORE YOU GOT MONEY TO BUY MORE.: NEVER TRUE

## 2025-08-07 ASSESSMENT — PATIENT HEALTH QUESTIONNAIRE - PHQ9
1. LITTLE INTEREST OR PLEASURE IN DOING THINGS: NOT AT ALL
SUM OF ALL RESPONSES TO PHQ QUESTIONS 1-9: 0
2. FEELING DOWN, DEPRESSED OR HOPELESS: NOT AT ALL
SUM OF ALL RESPONSES TO PHQ QUESTIONS 1-9: 0

## 2025-08-24 ENCOUNTER — OFFICE VISIT (OUTPATIENT)
Age: 35
End: 2025-08-24

## 2025-08-24 VITALS
RESPIRATION RATE: 18 BRPM | BODY MASS INDEX: 45.32 KG/M2 | DIASTOLIC BLOOD PRESSURE: 80 MMHG | SYSTOLIC BLOOD PRESSURE: 114 MMHG | TEMPERATURE: 97.9 F | HEART RATE: 77 BPM | HEIGHT: 66 IN | WEIGHT: 282 LBS | OXYGEN SATURATION: 95 %

## 2025-08-24 DIAGNOSIS — J45.21 MILD INTERMITTENT ASTHMATIC BRONCHITIS WITH ACUTE EXACERBATION: Primary | ICD-10-CM

## 2025-08-24 RX ORDER — ALBUTEROL SULFATE 90 UG/1
2 INHALANT RESPIRATORY (INHALATION) EVERY 4 HOURS PRN
Qty: 18 G | Refills: 1 | Status: SHIPPED | OUTPATIENT
Start: 2025-08-24

## 2025-08-24 RX ORDER — PREDNISONE 10 MG/1
TABLET ORAL
Qty: 12 TABLET | Refills: 0 | Status: SHIPPED | OUTPATIENT
Start: 2025-08-24 | End: 2025-09-01

## 2025-08-27 DIAGNOSIS — N39.498 OTHER URINARY INCONTINENCE: Primary | ICD-10-CM

## 2025-09-02 ENCOUNTER — PATIENT MESSAGE (OUTPATIENT)
Age: 35
End: 2025-09-02

## 2025-09-02 DIAGNOSIS — R05.9 COUGH, UNSPECIFIED TYPE: Primary | ICD-10-CM

## (undated) DEVICE — TRAY SPNL 24GA L4IN PENCAN PNCL PNT NDL 0.75% BIPIVCAIN W/

## (undated) DEVICE — PREVENA INCISION MANAGEMENT SYSTEM- PEEL & PLACE DRESSING: Brand: PREVENA™ PEEL & PLACE™

## (undated) DEVICE — 450 ML BOTTLE OF 0.05% CHLORHEXIDINE GLUCONATE IN 99.95% STERILE WATER FOR IRRIGATION, USP AND APPLICATOR.: Brand: IRRISEPT ANTIMICROBIAL WOUND LAVAGE

## (undated) DEVICE — SUTURE MONOCRYL SZ 4-0 L18IN ABSRB UD L19MM PS-2 3/8 CIR PRIM Y496G

## (undated) DEVICE — TOWEL SURG W16XL26IN WHT NONFENESTRATED ST 2 PER PK

## (undated) DEVICE — KENDALL SCD EXPRESS SLEEVES, KNEE LENGTH, MEDIUM: Brand: KENDALL SCD

## (undated) DEVICE — SUTURE VICRYL SZ 0 L36IN ABSRB UD L36MM CT-1 1/2 CIR J946H

## (undated) DEVICE — Z DUP USE 2522782 SOLUTION IRRIG 1000ML STRL H2O PLAS CONTAINER UROMATIC

## (undated) DEVICE — SOLUTION SOD CHL 0.9% 1000ML

## (undated) DEVICE — SUTURE MCRYL SZ 4-0 L18IN ABSRB UD L19MM PS-2 3/8 CIR PRIM Y496G

## (undated) DEVICE — SOLUTION IRRIGATION STRL H2O 1000 ML UROMATIC CONTAINER

## (undated) DEVICE — SUTURE VCRL SZ 2-0 L36IN ABSRB VLT L36MM CT-1 1/2 CIR J345H

## (undated) DEVICE — Device

## (undated) DEVICE — STERILE POLYISOPRENE POWDER-FREE SURGICAL GLOVES WITH EMOLLIENT COATING: Brand: PROTEXIS

## (undated) DEVICE — SUTURE VICRYL SZ 2-0 L36IN ABSRB VLT L36MM CT-1 1/2 CIR J345H